# Patient Record
Sex: MALE | Race: WHITE | NOT HISPANIC OR LATINO | Employment: OTHER | URBAN - NONMETROPOLITAN AREA
[De-identification: names, ages, dates, MRNs, and addresses within clinical notes are randomized per-mention and may not be internally consistent; named-entity substitution may affect disease eponyms.]

---

## 2021-09-04 ENCOUNTER — HISTORICAL (OUTPATIENT)
Dept: ADMINISTRATIVE | Facility: HOSPITAL | Age: 61
End: 2021-09-04

## 2021-10-05 LAB
BILIRUB SERPL-MCNC: NEGATIVE MG/DL
BLOOD URINE, POC: NEGATIVE
CLARITY, POC UA: CLEAR
COLOR, POC UA: YELLOW
GLUCOSE UR QL STRIP: NORMAL
KETONES UR QL STRIP: NEGATIVE
LEUKOCYTE EST, POC UA: NEGATIVE
NITRITE, POC UA: NEGATIVE
PH, POC UA: 5.5
PROTEIN, POC: NEGATIVE
SPECIFIC GRAVITY, POC UA: 1.01
UROBILINOGEN, POC UA: NORMAL

## 2021-11-11 ENCOUNTER — HISTORICAL (OUTPATIENT)
Dept: ADMINISTRATIVE | Facility: HOSPITAL | Age: 61
End: 2021-11-11

## 2021-11-18 ENCOUNTER — HISTORICAL (OUTPATIENT)
Dept: ADMINISTRATIVE | Facility: HOSPITAL | Age: 61
End: 2021-11-18

## 2021-11-18 LAB
ABS NEUT (OLG): 2.83 X10(3)/MCL (ref 2.1–9.2)
ALBUMIN SERPL-MCNC: 4.2 GM/DL (ref 3.4–4.8)
ALBUMIN/GLOB SERPL: 1.1 RATIO (ref 1.1–2)
ALP SERPL-CCNC: 62 UNIT/L (ref 40–150)
ALT SERPL-CCNC: 24 UNIT/L (ref 0–55)
AST SERPL-CCNC: 26 UNIT/L (ref 5–34)
BASOPHILS # BLD AUTO: 0.1 X10(3)/MCL (ref 0–0.2)
BASOPHILS NFR BLD AUTO: 1 %
BILIRUB SERPL-MCNC: 0.4 MG/DL
BILIRUBIN DIRECT+TOT PNL SERPL-MCNC: 0.2 MG/DL (ref 0–0.5)
BILIRUBIN DIRECT+TOT PNL SERPL-MCNC: 0.2 MG/DL (ref 0–0.8)
BUN SERPL-MCNC: 29.1 MG/DL (ref 8.4–25.7)
CALCIUM SERPL-MCNC: 10.1 MG/DL (ref 8.7–10.5)
CHLORIDE SERPL-SCNC: 100 MMOL/L (ref 98–107)
CO2 SERPL-SCNC: 31 MMOL/L (ref 23–31)
CREAT SERPL-MCNC: 1.16 MG/DL (ref 0.73–1.18)
CRP SERPL-MCNC: 0.58 MG/DL
EOSINOPHIL # BLD AUTO: 0.2 X10(3)/MCL (ref 0–0.9)
EOSINOPHIL NFR BLD AUTO: 4 %
ERYTHROCYTE [DISTWIDTH] IN BLOOD BY AUTOMATED COUNT: 13.7 % (ref 11.5–14.5)
ERYTHROCYTE [SEDIMENTATION RATE] IN BLOOD: 7 MM/HR (ref 0–15)
GLOBULIN SER-MCNC: 3.8 GM/DL (ref 2.4–3.5)
GLUCOSE SERPL-MCNC: 173 MG/DL (ref 82–115)
HCT VFR BLD AUTO: 47 % (ref 40–51)
HGB BLD-MCNC: 16.1 GM/DL (ref 13.5–17.5)
IMM GRANULOCYTES # BLD AUTO: 0.01 10*3/UL
IMM GRANULOCYTES NFR BLD AUTO: 0 %
LYMPHOCYTES # BLD AUTO: 1.9 X10(3)/MCL (ref 0.6–4.6)
LYMPHOCYTES NFR BLD AUTO: 34 %
MCH RBC QN AUTO: 34.5 PG (ref 26–34)
MCHC RBC AUTO-ENTMCNC: 34.3 GM/DL (ref 31–37)
MCV RBC AUTO: 100.9 FL (ref 80–100)
MONOCYTES # BLD AUTO: 0.4 X10(3)/MCL (ref 0.1–1.3)
MONOCYTES NFR BLD AUTO: 8 %
NEUTROPHILS # BLD AUTO: 2.83 X10(3)/MCL (ref 2.1–9.2)
NEUTROPHILS NFR BLD AUTO: 52 %
NRBC BLD AUTO-RTO: 0 % (ref 0–0.2)
PLATELET # BLD AUTO: 52 X10(3)/MCL (ref 130–400)
PMV BLD AUTO: 11.6 FL (ref 7.4–10.4)
POTASSIUM SERPL-SCNC: 4.3 MMOL/L (ref 3.5–5.1)
PROT SERPL-MCNC: 8 GM/DL (ref 5.8–7.6)
RBC # BLD AUTO: 4.66 X10(6)/MCL (ref 4.5–5.9)
SODIUM SERPL-SCNC: 140 MMOL/L (ref 136–145)
WBC # SPEC AUTO: 5.5 X10(3)/MCL (ref 4.5–11)

## 2022-01-03 ENCOUNTER — HISTORICAL (OUTPATIENT)
Dept: ADMINISTRATIVE | Facility: HOSPITAL | Age: 62
End: 2022-01-03

## 2022-01-03 LAB
ABS NEUT (OLG): 4.03 X10(3)/MCL (ref 2.1–9.2)
ALBUMIN SERPL-MCNC: 4.4 GM/DL (ref 3.4–4.8)
ALBUMIN/GLOB SERPL: 1.1 RATIO (ref 1.1–2)
ALP SERPL-CCNC: 71 UNIT/L (ref 40–150)
ALT SERPL-CCNC: 25 UNIT/L (ref 0–55)
AST SERPL-CCNC: 29 UNIT/L (ref 5–34)
BASOPHILS # BLD AUTO: 0.1 X10(3)/MCL (ref 0–0.2)
BASOPHILS NFR BLD AUTO: 1 %
BILIRUB SERPL-MCNC: 1 MG/DL
BILIRUBIN DIRECT+TOT PNL SERPL-MCNC: 0.3 MG/DL (ref 0–0.5)
BILIRUBIN DIRECT+TOT PNL SERPL-MCNC: 0.7 MG/DL (ref 0–0.8)
BUN SERPL-MCNC: 17.4 MG/DL (ref 8.4–25.7)
CALCIUM SERPL-MCNC: 10.4 MG/DL (ref 8.7–10.5)
CHLORIDE SERPL-SCNC: 101 MMOL/L (ref 98–107)
CO2 SERPL-SCNC: 28 MMOL/L (ref 23–31)
CREAT SERPL-MCNC: 0.95 MG/DL (ref 0.73–1.18)
CRP SERPL HS-MCNC: 0.32 MG/DL
EOSINOPHIL # BLD AUTO: 0.1 X10(3)/MCL (ref 0–0.9)
EOSINOPHIL NFR BLD AUTO: 2 %
ERYTHROCYTE [DISTWIDTH] IN BLOOD BY AUTOMATED COUNT: 14.1 % (ref 11.5–14.5)
ERYTHROCYTE [SEDIMENTATION RATE] IN BLOOD: 7 MM/HR (ref 0–15)
EST. AVERAGE GLUCOSE BLD GHB EST-MCNC: 182.9 MG/DL
FERRITIN SERPL-MCNC: 118.39 NG/ML (ref 21.81–274.66)
FOLATE SERPL-MCNC: 15.4 NG/ML (ref 7–31.4)
GLOBULIN SER-MCNC: 4 GM/DL (ref 2.4–3.5)
GLUCOSE SERPL-MCNC: 259 MG/DL (ref 82–115)
HBA1C MFR BLD: 8 %
HCT VFR BLD AUTO: 51.1 % (ref 40–51)
HGB BLD-MCNC: 17.6 GM/DL (ref 13.5–17.5)
IMM GRANULOCYTES # BLD AUTO: 0.03 10*3/UL
IMM GRANULOCYTES NFR BLD AUTO: 0 %
IRON SERPL-MCNC: 109 UG/DL (ref 65–175)
LYMPHOCYTES # BLD AUTO: 1.6 X10(3)/MCL (ref 0.6–4.6)
LYMPHOCYTES NFR BLD AUTO: 25 %
MCH RBC QN AUTO: 34.4 PG (ref 26–34)
MCHC RBC AUTO-ENTMCNC: 34.4 GM/DL (ref 31–37)
MCV RBC AUTO: 100 FL (ref 80–100)
MONOCYTES # BLD AUTO: 0.5 X10(3)/MCL (ref 0.1–1.3)
MONOCYTES NFR BLD AUTO: 8 %
NEUTROPHILS # BLD AUTO: 4.03 X10(3)/MCL (ref 2.1–9.2)
NEUTROPHILS NFR BLD AUTO: 64 %
NRBC BLD AUTO-RTO: 0 % (ref 0–0.2)
PLATELET # BLD AUTO: 122 X10(3)/MCL (ref 130–400)
PMV BLD AUTO: 11.3 FL (ref 7.4–10.4)
POTASSIUM SERPL-SCNC: 4.8 MMOL/L (ref 3.5–5.1)
PROT SERPL-MCNC: 8.4 GM/DL (ref 5.8–7.6)
RBC # BLD AUTO: 5.11 X10(6)/MCL (ref 4.5–5.9)
SODIUM SERPL-SCNC: 138 MMOL/L (ref 136–145)
VIT B12 SERPL-MCNC: 617 PG/ML (ref 213–816)
WBC # SPEC AUTO: 6.3 X10(3)/MCL (ref 4.5–11)

## 2022-03-02 ENCOUNTER — HISTORICAL (OUTPATIENT)
Dept: ADMINISTRATIVE | Facility: HOSPITAL | Age: 62
End: 2022-03-02

## 2022-04-10 ENCOUNTER — HISTORICAL (OUTPATIENT)
Dept: ADMINISTRATIVE | Facility: HOSPITAL | Age: 62
End: 2022-04-10
Payer: COMMERCIAL

## 2022-04-26 VITALS
BODY MASS INDEX: 31.26 KG/M2 | SYSTOLIC BLOOD PRESSURE: 116 MMHG | HEIGHT: 71 IN | OXYGEN SATURATION: 97 % | WEIGHT: 223.31 LBS | DIASTOLIC BLOOD PRESSURE: 70 MMHG

## 2022-05-03 NOTE — HISTORICAL [facility] CERNER
This is a historical note converted from Brittany. Formatting and pictures may have been removed.  Please reference Brittany for original formatting and attached multimedia. Chief Complaint  osteo f/u  History of Present Illness  Mr Arnold is a 61 yr old WM here for post ken ID follow up for osteomyelitis to bilateral feet in the setting of diabetic foot ulcers.? Pt was treated with IV antimicrobials (Vancomycin and Zosyn) for 31 or 42 days.? He had to remain inpatient for treatment d/t insurance issues while awaiting LTAC placement.? He was then denied LTAC placement by insurance.? He transitioned to oral treatment with Cipro + Zyvox to complete the remainder of treatment as he wanted to go home (these medications get same PO / IV bioavailability).? Was not a candidate for home OPAT as he could not see and lacked transportation to come back and forth to hospital.? Pt reports he took medications once home with 100% compliance.? He thinks he?could have?had possible subjective fevers when he initially arrived home, but did not have thermometer to monitor,?and has not had any since.? Denies chills, night sweats, rash, HA, vision changes, dizziness, CP/SOB, cough, N/V/D, abdominal pain, or urinary complaints.? Pt has been going weekly to wound care.? States had calculus removal / debridement on last visit.? Another wound care visit is planned for today.? Pt does not monitor CBGs at home d/t his inability to see, but states everything in the home is sugar-free.? Pt was treated with Bicillin 2.4 million units IM weekly x 3 while inpatient for latent syphilis of unknown duration.? Smokes 1/4 ppd.? IM post ken visit on 11/22/21.  Review of Systems  Full 14 point ROS performed. ?All negative, except as mentioned in HPI?  Physical Exam  Vitals & Measurements  T:?36.5? ?C (Oral)? HR:?61(Peripheral)? RR:?16? BP:?116/70?  HT:?180.00?cm? WT:?101.300?kg? BMI:?31.27?  General:?Well-developed well-nourished WM in no acute  distress  Eye: PERRL, clear conjunctiva, eyelids normal, + blindness / low vision bilateral eyes  HENT:?oropharynx without erythema/exudate, oropharynx and nasal mucosal surfaces moist, no thrush, dry lips  Neck: supple, no JVD  Respiratory:?Breathing unlabored. Lungs clear to auscultation bilaterally  Cardiovascular:?regular rate and rhythm without murmurs, gallops or rubs  Gastrointestinal:?soft, non-tender, non-distended with normal bowel sounds, without masses to palpation  Genitourinary: no CVA tenderness to palpation  Musculoskeletal:?full range of motion of all extremities/spine without limitation or discomfort  Integumentary: no rashes, dried ulcerations / calluses bilateral feet  Neurologic: cranial nerves grossly intact  Assessment/Plan  1.?Osteomyelitis of right foot?M86.9,?Osteomyelitis of left foot?M86.9  Pt has completed 42 day?course of treatment of osteomyelitis  Remains with ulcerations; pt to keep wound care appointments  Have discussed with pt?the use of?chronic suppressive therapy; risks vs benefits, along with a/es.? Pt in agreement to proceed.? Will plan to start pt on Doxycycline 100 mg po BID.? Pt to remain upright for at least 30 minutes to prevent pill esophagitis, along with wearing sun protection d/t photosensitivity with medication (avoid directed sun exposure, wear sunscreen, wear sunglasses, wear long sleeves / skin protection, etc). Pt verbalized understanding and agrees with plan. ?  RTC in 3 months with Dr RAVINDRA Camp  Ordered:  doxycycline, 100 mg = 1 cap(s), Oral, BID, Stay upright 30 minutes after taking; watch for photosensitivity issues, X 30 day(s), # 60 cap(s), 3 Refill(s), Pharmacy: [id number], 180, cm, Height/Length Dosing, 11/18/21 12:45:00 CST, 101.3, kg, Weight Dosi...  1160F- Medication reconciliation completed during visit, Osteomyelitis of right foot  Osteomyelitis of left foot  Diabetic foot ulcer with osteomyelitis  Blindness/low vision  Tobacco user, [place]  [facility], 11/18/21 13:27:00 CST  Clinic Follow up, *Est. 02/17/22 12:45:00 CST, Order for future visit, Blindness/low vision, [facility]  Procalcitonin- [facility]- [id number], Routine collect, 11/18/21 13:33:00 CST, Blood, Stop date 11/18/21 13:33:00 CST, Lab Collect, Osteomyelitis of left foot  Osteomyelitis of left foot, 11/18/21 13:33:00 CST  ?  3.?Diabetic foot ulcer with osteomyelitis?E11.621  Strict diabetic control encouraged  Keep wound care appointments as scheduled  Ordered:  1160F- Medication reconciliation completed during visit, Osteomyelitis of right foot  Osteomyelitis of left foot  Diabetic foot ulcer with osteomyelitis  Blindness/low vision  Tobacco user, [facility], 11/18/21 13:27:00 CST  Clinic Follow up, *Est. 02/17/22 12:45:00 CST, Order for future visit, Blindness/low vision, [facility]  ?  4.?History of syphilis?Z86.19  Last RPR nonreactive  Completed full course of treatment with Bicillin 2.4 million units IM x 3 for latent syphilis of unknown duration  Will need to check RPR on next visit  ?  5.?Blindness/low vision?H54.7  Safety measures encouraged  Ordered:  1160F- Medication reconciliation completed during visit, Osteomyelitis of right foot  Osteomyelitis of left foot  Diabetic foot ulcer with osteomyelitis  Blindness/low vision  Tobacco user, [facility], 11/18/21 13:27:00 CST  Clinic Follow up, *Est. 02/17/22 12:45:00 CST, Order for future visit, Blindness/low vision, [facility]  ?  6.?Tobacco user?Z72.0  Smoking cessation encouraged  Ordered:  1160F- Medication reconciliation completed during visit, Osteomyelitis of right foot  Osteomyelitis of left foot  Diabetic foot ulcer with osteomyelitis  Blindness/low vision  Tobacco user, [facility], 11/18/21 13:27:00 CST  ?  7. Diabetes  Follow up with  clinic for further management  Strict diabetic control stressed  Referrals  Clinic Follow up, *Est. 02/17/22 12:45:00 CST, Order for future visit, Blindness/low vision,  [facility]   Problem List/Past Medical History  Ongoing  Acute bronchitis  Anxiety depression  Bipolar 1 disorder  Diabetes  Non-healing wound of right lower extremity  Historical  No qualifying data  Procedure/Surgical History  Insertion of Infusion Device into Superior Vena Cava, Percutaneous Approach (10/21/2021)  Ultrasonography of Superior Vena Cava, Guidance (10/21/2021)  appendectomy  right foot sx  stint to l leg   Medications  Coreg 3.125 mg oral tablet, 3.125 mg= 1 tab(s), Oral, BIDWMeal,? ?Not taking  Depakote  mg oral tablet, extended release, 500 mg= 1 tab(s), Oral, BID,? ?Not taking: has been out since March 2021  doxycycline hyclate 100 mg oral capsule, 100 mg= 1 cap(s), Oral, BID, 3 refills  folic acid 1 mg oral tablet, 1 mg= 1 tab(s), Oral, Daily  furosemide 40 mg oral tablet, 40 mg= 1 tab(s), Oral, BID  Lantus 100 units/mL subcutaneous solution, 45 units, Subcutaneous, At Bedtime  metFORMIN 1000 mg oral tablet, 1000 mg= 1 tab(s), Oral, BID,? ?Not taking: has been out since March 2021  mirtazapine 15 mg oral tablet, disintegrating, 15 mg= 1 tab(s), Oral, Once a day (at bedtime),? ?Not taking: has been out since March 2021  Potassium Chloride (Eqv-K-Tab) 10 mEq oral tablet, extended release, 10 mEq= 1 tab(s), Oral, Daily  spironolactone 25 mg oral tablet, 25 mg= 1 tab(s), Oral, BID  Allergies  sulfa drugs?(hypotension)  Social History  Abuse/Neglect  No, No, Yes, 11/18/2021  Alcohol  Past, Wine, Liquor, 1-2 times per week, Alcohol use interferes with work or home: No. Others hurt by drinking: No. Household alcohol concerns: No., 10/06/2021  Substance Use  Never, 10/06/2021  Tobacco  5-9 cigarettes (between 1/4 to 1/2 pack)/day in last 30 days, No, 11/18/2021  Family History  unknown  Immunizations  Vaccine Date Status Comments   influenza virus vaccine, inactivated 10/06/2021 Given    tetanus/diphtheria/pertussis, acel(Tdap) 10/06/2021 Given    pneumococcal 13-valent conjugate vaccine  10/06/2021 Given Early/Late Reason:  New Med Order   Health Maintenance  Health Maintenance  ???Pending?(in the next year)  ??? ??Due?  ??? ? ? ?Aspirin Therapy for CVD Prevention due??11/18/21??and every 1??year(s)  ??? ? ? ?COPD Maintenance-Spirometry due??11/18/21??Unknown Frequency  ??? ? ? ?Colorectal Screening due??11/18/21??Unknown Frequency  ??? ? ? ?Diabetes Maintenance-Eye Exam due??11/18/21??Unknown Frequency  ??? ? ? ?Diabetes Maintenance-Foot Exam due??11/18/21??Unknown Frequency  ??? ? ? ?Lung Cancer Screening due??11/18/21??and every 1??year(s)  ??? ? ? ?Medicare Annual Wellness Exam due??11/18/21??and every 1??year(s)  ??? ? ? ?Zoster Vaccine due??11/18/21??Unknown Frequency  ??? ??Due In Future?  ??? ? ? ?Obesity Screening not due until??01/01/22??and every 1??year(s)  ??? ? ? ?Smoking Cessation not due until??01/01/22??and every 1??year(s)  ??? ? ? ?Alcohol Misuse Screening not due until??01/02/22??and every 1??year(s)  ??? ? ? ?Diabetes Maintenance-HgbA1c not due until??10/05/22??and every 1??year(s)  ??? ? ? ?Diabetes Maintenance-Fasting Lipid Profile not due until??10/06/22??and every 1??year(s)  ???Satisfied?(in the past 1 year)  ??? ??Satisfied?  ??? ? ? ?ADL Screening on??11/18/21.??Satisfied by Falguni Adam  ??? ? ? ?Alcohol Misuse Screening on??10/05/21.??Satisfied by Masha Reid LPN  ??? ? ? ?Blood Pressure Screening on??11/18/21.??Satisfied by Falguni Adam  ??? ? ? ?Body Mass Index Check on??11/18/21.??Satisfied by Falguni Adam  ??? ? ? ?Depression Screening on??11/18/21.??Satisfied by Falguni Adam  ??? ? ? ?Diabetes Maintenance-Serum Creatinine on??11/18/21.??Satisfied by Clara Edward  ??? ? ? ?Diabetes Maintenance-Fasting Lipid Profile on??10/06/21.??Satisfied by [facility], [facility]  ??? ? ? ?Diabetes Maintenance-HgbA1c on??10/05/21.??Satisfied by Clive Ghosh  ??? ? ? ?Diabetes Screening on??11/18/21.??Satisfied by Clara Edward  ??? ? ?  ?Influenza Vaccine on??10/06/21.??Satisfied by Ayse Garnett RN  ??? ? ? ?Lipid Screening on??10/06/21.??Satisfied by [facility], [facility]  ??? ? ? ?Obesity Screening on??11/18/21.??Satisfied by Falguni Adam  ??? ? ? ?Smoking Cessation on??10/13/21.??Satisfied by Luis Chang LPN  ??? ? ? ?Tetanus Vaccine on??10/06/21.??Satisfied by Ayse Garnett RN  ?  Lab Results  Test Name Test Result Date/Time Comments   Sodium Lvl 139 mmol/L 11/05/2021 11:54 CDT    Potassium Lvl 4.1 mmol/L 11/05/2021 11:54 CDT    Chloride 102 mmol/L 11/05/2021 11:54 CDT    CO2 30 mmol/L 11/05/2021 11:54 CDT    Calcium Lvl 10.1 mg/dL 11/05/2021 11:54 CDT    Glucose Lvl 125 mg/dL (High) 11/05/2021 11:54 CDT    BUN 24.1 mg/dL 11/05/2021 11:54 CDT    Creatinine 1.12 mg/dL 11/05/2021 11:54 CDT    eGFR-KAJAL 70 mL/min/1.73 m2 (Low) 11/05/2021 11:54 CDT    CRP <0.40 mg/dL 11/05/2021 08:23 CDT    WBC 5.7 x10(3)/mcL 11/03/2021 10:00 CDT    Hgb 14.6 gm/dL 11/03/2021 10:00 CDT    Hct 42.1 % 11/03/2021 10:00 CDT    Platelet 87 x10(3)/mcL (Low) 11/03/2021 10:00 CDT    Sed Rate 8 mm/hr 11/05/2021 08:23 CDT Note: reference ranges for ages 50 and over have changed.   Vanco Tr 17.2 ug/ml 11/05/2021 10:00 CDT        Patient seen and examined with NP. ?Agree with documented physical exam. ?Treatment plan reviewed and discussed with nurse practitioner and is reasonable and appropriate. ?

## 2022-05-10 ENCOUNTER — TELEPHONE (OUTPATIENT)
Dept: INTERNAL MEDICINE | Facility: CLINIC | Age: 62
End: 2022-05-10
Payer: COMMERCIAL

## 2022-05-23 ENCOUNTER — HOSPITAL ENCOUNTER (OUTPATIENT)
Dept: WOUND CARE | Facility: HOSPITAL | Age: 62
Discharge: HOME | End: 2022-05-23
Attending: NURSE PRACTITIONER
Payer: COMMERCIAL

## 2022-05-23 VITALS
HEART RATE: 72 BPM | RESPIRATION RATE: 20 BRPM | DIASTOLIC BLOOD PRESSURE: 71 MMHG | SYSTOLIC BLOOD PRESSURE: 125 MMHG | TEMPERATURE: 98 F

## 2022-05-23 DIAGNOSIS — M86.672 CHRONIC OSTEOMYELITIS OF LEFT FOOT: ICD-10-CM

## 2022-05-23 DIAGNOSIS — L60.3 DYSTROPHIC NAIL: ICD-10-CM

## 2022-05-23 DIAGNOSIS — L84 CORNS AND CALLOSITIES: ICD-10-CM

## 2022-05-23 DIAGNOSIS — M86.671 CHRONIC OSTEOMYELITIS OF RIGHT FOOT: ICD-10-CM

## 2022-05-23 DIAGNOSIS — H54.3 BLINDNESS OF BOTH EYES: ICD-10-CM

## 2022-05-23 DIAGNOSIS — E11.622 TYPE 2 DIABETES MELLITUS WITH OTHER SKIN ULCER, WITHOUT LONG-TERM CURRENT USE OF INSULIN: Primary | ICD-10-CM

## 2022-05-23 DIAGNOSIS — Z72.0 TOBACCO ABUSE: ICD-10-CM

## 2022-05-23 DIAGNOSIS — L60.2 OVERGROWN TOENAILS: ICD-10-CM

## 2022-05-23 PROCEDURE — 97597 DBRDMT OPN WND 1ST 20 CM/<: CPT

## 2022-05-23 PROCEDURE — 99203 PR OFFICE/OUTPT VISIT, NEW, LEVL III, 30-44 MIN: ICD-10-PCS | Mod: 25,,, | Performed by: NURSE PRACTITIONER

## 2022-05-23 PROCEDURE — 97598 DBRDMT OPN WND ADDL 20CM/<: CPT

## 2022-05-23 PROCEDURE — 99203 OFFICE O/P NEW LOW 30 MIN: CPT | Mod: 25,,, | Performed by: NURSE PRACTITIONER

## 2022-05-23 PROCEDURE — 97597 PR DEBRIDEMENT OPEN WOUND 20 SQ CM<: ICD-10-PCS | Mod: ,,, | Performed by: NURSE PRACTITIONER

## 2022-05-23 PROCEDURE — 97597 DBRDMT OPN WND 1ST 20 CM/<: CPT | Mod: ,,, | Performed by: NURSE PRACTITIONER

## 2022-05-23 PROCEDURE — 11721 DEBRIDE NAIL 6 OR MORE: CPT | Mod: 59

## 2022-05-23 RX ORDER — DOXYCYCLINE 100 MG/1
CAPSULE ORAL
COMMUNITY
Start: 2022-04-27 | End: 2022-06-06 | Stop reason: SDUPTHER

## 2022-05-23 NOTE — PROGRESS NOTES
Subjective:       Patient ID: Con Arnold is a 61 y.o. male.    Chief Complaint: Diabetic Foot Ulcer    60 y/o male presents to wound care care today follow up regarding bilateral feet callous Patient states that he now has a place to live and his car back.  Patient has seen Dr. Camp in the past for chronic osteomyelitis patient is aware of consequences possible bilateral amputation of feet due to osteomyelitis, and of not keeping his appointments, noncompliance medications and Diabetes medications.  Hospitalized on 3/2/2022 for chronic osteomyelitis of bilateral feet and right foot cellulitis patient was treated with IV antibiotics with set up to go to nursing home patient reports decided to stay with partner and was able to find a place in [place], [place].   Hx: DM,Blindness/low vision,Diabetic foot ulcer with osteomyelitis,Osteomyelitis of left and right foot, Syphilis, and Tobacco use.    Recent Review of Labs:Glucose Lvl 157 mg/dL 03/09/2022BUN 33.6 mg/dL 03/09/2022Creatinine 0.81 mg/dL 03/09/2022eGFR-AA >105  03/09/2022  eGFR-KAJAL 102 mL/min/1.73 m2 03/09/2022  CRP <0.40 mg/dL 03/10/2022Sed Rate 8 mm/hr 03/10/2022Hgb A1c 8.0 % 01/03/2022  Xray Right Foot 3/2/22:  No acute displaced fractures or dislocations.  There is evidence of erosive changes involving the second metatarsal  head with slight subluxation of the metatarsal pharyngeal joint  disease changes are similar to the exam of October 5, 2021 there are  some degenerative changes of the interphalangeal joints no acute  fractures or dislocations are otherwise identified a radiopaque 3 mm  foreign body is identified at the plantar aspect Soft tissues within  normal limits.  IMPRESSION:Persistent erosive changes of the second metatarsal with slight  subluxation.Radiopaque foreign body.No other significant change    Xray Left Foot 3/2/22:  No acute displaced fractures or dislocations.  There is some narrowing of the interphalangeal joints with  slight  flexion deformity of the third fourth and fifth toe also evidence of  subluxation involving the second metatarsal phalangeal joint and  slight subluxation involving the third metatarsal pharyngeal joint.  There is a hallux valgus deformity  No blastic or lytic lesions. Soft tissues within normal limits.  IMPRESSION: Subluxation of the second and third metatarsophalangeal joints.  Flexion deformity of the third fourth and fifth toes with degenerative  changes. Hallux valgus deformity    MRI 3/3/22: Left foot   Trabecular edema and abnormal enhancement present to the second  metatarsal head have worsened minimally since the prior study and  early osteomyelitis is likely.  Trabecular edema to the first metatarsal head and neck is stable and  appears to be associated with marginal erosions to the first  metatarsal phalangeal joint. This is favored to represent  osteomyelitis/septic arthritis or gout. Osteomyelitis is favored.  Dorsal dislocation of the proximal second and third phalanges with  tears of the associated plantar plates.  The thin fluid collection previously seen in the ulcer base over the  third metatarsal head has increased minimally in size and is favored  to represent a small abscess or adventitial bursitis.  Myositis and synovitis are noted to the forefoot.   MRI 3/3/22: Right foot  There has been relatively little change when compared to the prior  study. Arguably the infected portion of the distal second metatarsal  has decreased in the interval but the degree of destruction appears to  have worsened and the difference, if any, would likely be clinically  insignificant.  Septic arthritis of the second metatarsal phalangeal joint which  decompresses into the adjacent soft tissue. Second plantar plate and  second flexor tendon are torn.  Myositis is present to the anterior interossei muscles. Cellulitis is  present to the forefoot.    Today's Visit :   Patient presents to clinic with bilateral  plantar calluses left foot plantar surface callus black center and macerated, and right foot plantar surface callus soft.  Left plantar callous paired using Dremel. Left plantar callous selective debridement using #7 and #4 Dermel curette. Trimmed all dystrophic toenails using podiatry clippers and filed with Orchard board.   Instructed patient he needs to follow-up with podiatrist patient voices his called and left messages with Dr. Mancia office and waiting call back.   Instructed patient this is a maintenance calluses and he does have chronic osteomyelitis he needs to take all medicines as prescribe. Instructed to wash feet with Hibiscleanse soap  paint his left plantar callous area with Betadine or Gentain violet. Right plantar callous keep moisturized. Pt voices he is schedule to see his new PCP next week. Informed he to let his PCP know he needs diabetic shoes or insert scripts. Agreed. Denies fevers, chills, increased redness, increased pain, odors, or yellow/green drainage. No new skin breakdown, rashes, or other skin issues.       Review of Systems   All other systems reviewed and are negative.      Objective:      Physical Exam  Cardiovascular:      Pulses:           Dorsalis pedis pulses are 1+ on the right side and 1+ on the left side.        Posterior tibial pulses are 1+ on the right side and 1+ on the left side.   Pulmonary:      Effort: Pulmonary effort is normal.   Musculoskeletal:        Feet:    Feet:      Right foot:      Toenail Condition: Right toenails are abnormally thick and long. Fungal disease present.     Left foot:      Toenail Condition: Left toenails are abnormally thick and long. Fungal disease present.     Comments: Callus to left plantar surface with black wound bed macerated edges and wound bed foul odor.    Right plantar callus smooth edges after filing with Dremel.    Monofilament test performed no sensation noted to all areas.    Neurological:      Mental Status: He is alert.          Assessment:       1. Type 2 diabetes mellitus with other skin ulcer, without long-term current use of insulin    2. Corns and callosities    3. Chronic osteomyelitis of left foot    4. Chronic osteomyelitis of right foot    5. Dystrophic nail    6. Overgrown toenails    7. Tobacco abuse    8. Blindness of both eyes           Wound 05/23/22 1347 Diabetic Ulcer Left plantar Foot #1 (Active)   05/23/22 1347    Pre-existing: Yes   Primary Wound Type: Diabetic ulc   Side: Left   Orientation: plantar   Location: Foot   Wound Number: #1   Ankle-Brachial Index:    Pulses:    Removal Indication and Assessment:    Wound Outcome:    (Retired) Wound Type:    (Retired) Wound Length (cm):    (Retired) Wound Width (cm):    (Retired) Depth (cm):    Wound Description (Comments):    Removal Indications:    Wound Image   05/23/22 1349   Dressing Appearance Moist drainage;Dry 05/23/22 1340   Drainage Amount None 05/23/22 1340   Tissue loss description Full thickness 05/23/22 1340   Wound Edges Callused 05/23/22 1340   Wound Length (cm) 2.5 cm 05/23/22 1340   Wound Width (cm) 1.5 cm 05/23/22 1340   Wound Depth (cm) 0 cm 05/23/22 1340   Wound Volume (cm^3) 0 cm^3 05/23/22 1340   Wound Surface Area (cm^2) 3.75 cm^2 05/23/22 1340            Wound 05/23/22 1345 Diabetic Ulcer Right plantar Foot #2 (Active)   05/23/22 1345    Pre-existing: Yes   Primary Wound Type: Diabetic ulc   Side: Right   Orientation: plantar   Location: Foot   Wound Number: #2   Ankle-Brachial Index:    Pulses:    Removal Indication and Assessment:    Wound Outcome:    (Retired) Wound Type:    (Retired) Wound Length (cm):    (Retired) Wound Width (cm):    (Retired) Depth (cm):    Wound Description (Comments):    Removal Indications:    Dressing Appearance Moist drainage 05/23/22 1349   Drainage Amount Moderate 05/23/22 1349   Drainage Characteristics/Odor Serosanguineous;Malodorous 05/23/22 1349   Tissue loss description Full thickness 05/23/22 1349   Wound  Length (cm) 4.5 cm 05/23/22 1349   Wound Width (cm) 1.6 cm 05/23/22 1349   Wound Depth (cm) 0.5 cm 05/23/22 1349   Wound Volume (cm^3) 3.6 cm^3 05/23/22 1349   Wound Surface Area (cm^2) 7.2 cm^2 05/23/22 1349           Plan:            1.-6. Foot care; wash feet daily with Hibiclens soap pat dry apply Gentain Violet or Betadine to left plantar callous. Continue taking suppressive treatment of Doxycycline. Will follow up in one month.    7. Instructed on smoking cessation.  8. Instructed on safety with ambulation due to blindness, and have assistance with foot care and checking feet daily.

## 2022-05-27 ENCOUNTER — TELEPHONE (OUTPATIENT)
Dept: FAMILY MEDICINE | Facility: CLINIC | Age: 62
End: 2022-05-27
Payer: COMMERCIAL

## 2022-05-31 ENCOUNTER — TELEPHONE (OUTPATIENT)
Dept: PRIMARY CARE CLINIC | Facility: CLINIC | Age: 62
End: 2022-05-31
Payer: COMMERCIAL

## 2022-05-31 NOTE — TELEPHONE ENCOUNTER
Patient is medically complex.  Appears to have canceled an in office appt with provider in [place] today, then rescheduled a virtual appt with me tomorrow.    Please advise patient new patient appt are not preformed virtually and his medical situation is not appropriate for at 15 minute appt.    He should get an in office appt with provider in [place].      - Dr. Dias

## 2022-05-31 NOTE — TELEPHONE ENCOUNTER
Provider can't refill medication until establish care has been provided first ,then refills are available with continuous f/u appt. Patient can go to ED or [facility] for medication refill until care has been establish.Thanks

## 2022-06-01 ENCOUNTER — PATIENT MESSAGE (OUTPATIENT)
Dept: PRIMARY CARE CLINIC | Facility: CLINIC | Age: 62
End: 2022-06-01
Payer: COMMERCIAL

## 2022-06-01 ENCOUNTER — TELEPHONE (OUTPATIENT)
Dept: ENDOCRINOLOGY | Facility: CLINIC | Age: 62
End: 2022-06-01
Payer: COMMERCIAL

## 2022-06-01 NOTE — TELEPHONE ENCOUNTER
----- Message from Donna Weekly sent at 5/31/2022  3:27 PM CDT -----  Regarding: [facility]:  NO SHOW on 5/31/22  DR. STOUT PT    PT missed appt today with Dr. Ruiz.  When I called and spoke to pt to r/s this missed appt, he stated that he wouldn't want to r/s this appt b/c October/November is too long to wait.    Please inform Dr. Ruiz that pt doesn't want a f/up and is not r/s.

## 2022-06-01 NOTE — TELEPHONE ENCOUNTER
Tried to call patient, but there is no voicemail set up.  Unable to leave a message.  Will continue to try to reach him.

## 2022-06-06 ENCOUNTER — OFFICE VISIT (OUTPATIENT)
Dept: INFECTIOUS DISEASES | Facility: CLINIC | Age: 62
End: 2022-06-06
Payer: COMMERCIAL

## 2022-06-06 ENCOUNTER — TELEPHONE (OUTPATIENT)
Dept: INFECTIOUS DISEASES | Facility: CLINIC | Age: 62
End: 2022-06-06

## 2022-06-06 VITALS
DIASTOLIC BLOOD PRESSURE: 79 MMHG | WEIGHT: 213 LBS | BODY MASS INDEX: 28.85 KG/M2 | TEMPERATURE: 98 F | HEIGHT: 72 IN | RESPIRATION RATE: 18 BRPM | SYSTOLIC BLOOD PRESSURE: 125 MMHG | HEART RATE: 73 BPM

## 2022-06-06 DIAGNOSIS — M86.671 CHRONIC OSTEOMYELITIS OF RIGHT FOOT: ICD-10-CM

## 2022-06-06 DIAGNOSIS — Z72.51 HIGH RISK SEXUAL BEHAVIOR, UNSPECIFIED TYPE: ICD-10-CM

## 2022-06-06 DIAGNOSIS — N17.9 AKI (ACUTE KIDNEY INJURY): Primary | ICD-10-CM

## 2022-06-06 DIAGNOSIS — R89.9 ABNORMAL LABORATORY TEST RESULT: Primary | ICD-10-CM

## 2022-06-06 DIAGNOSIS — M86.672 CHRONIC OSTEOMYELITIS OF LEFT FOOT: Primary | ICD-10-CM

## 2022-06-06 DIAGNOSIS — Z86.19 HISTORY OF SYPHILIS: ICD-10-CM

## 2022-06-06 DIAGNOSIS — E13.621 DIABETIC FOOT ULCER ASSOCIATED WITH OTHER SPECIFIED DIABETES MELLITUS, UNSPECIFIED LATERALITY, UNSPECIFIED PART OF FOOT, UNSPECIFIED ULCER STAGE: ICD-10-CM

## 2022-06-06 DIAGNOSIS — Z79.2 CHRONIC ANTIBIOTIC SUPPRESSION: ICD-10-CM

## 2022-06-06 DIAGNOSIS — L97.509 DIABETIC FOOT ULCER ASSOCIATED WITH OTHER SPECIFIED DIABETES MELLITUS, UNSPECIFIED LATERALITY, UNSPECIFIED PART OF FOOT, UNSPECIFIED ULCER STAGE: ICD-10-CM

## 2022-06-06 DIAGNOSIS — H54.10 BLINDNESS AND LOW VISION: ICD-10-CM

## 2022-06-06 LAB
ALBUMIN SERPL-MCNC: 4.3 GM/DL (ref 3.4–4.8)
ALBUMIN/GLOB SERPL: 1 RATIO (ref 1.1–2)
ALP SERPL-CCNC: 84 UNIT/L (ref 40–150)
ALT SERPL-CCNC: 24 UNIT/L (ref 0–55)
AST SERPL-CCNC: 26 UNIT/L (ref 5–34)
BASOPHILS # BLD AUTO: 0.09 X10(3)/MCL (ref 0–0.2)
BASOPHILS NFR BLD AUTO: 1.1 %
BILIRUBIN DIRECT+TOT PNL SERPL-MCNC: 1 MG/DL
BUN SERPL-MCNC: 25 MG/DL (ref 8.4–25.7)
CALCIUM SERPL-MCNC: 11.3 MG/DL (ref 8.8–10)
CHLORIDE SERPL-SCNC: 97 MMOL/L (ref 98–107)
CO2 SERPL-SCNC: 29 MMOL/L (ref 23–31)
CREAT SERPL-MCNC: 1.43 MG/DL (ref 0.73–1.18)
CRP SERPL-MCNC: 7.1 MG/L
EOSINOPHIL # BLD AUTO: 0.12 X10(3)/MCL (ref 0–0.9)
EOSINOPHIL NFR BLD AUTO: 1.5 %
ERYTHROCYTE [DISTWIDTH] IN BLOOD BY AUTOMATED COUNT: 13.4 % (ref 11.5–17)
ERYTHROCYTE [SEDIMENTATION RATE] IN BLOOD: 39 MM/HR (ref 0–15)
GLOBULIN SER-MCNC: 4.4 GM/DL (ref 2.4–3.5)
GLUCOSE SERPL-MCNC: 363 MG/DL (ref 82–115)
HCT VFR BLD AUTO: 58 % (ref 42–52)
HGB BLD-MCNC: 19.4 GM/DL (ref 14–18)
HIV 1+2 AB+HIV1 P24 AG SERPL QL IA: NONREACTIVE
IMM GRANULOCYTES # BLD AUTO: 0.04 X10(3)/MCL (ref 0–0.02)
IMM GRANULOCYTES NFR BLD AUTO: 0.5 % (ref 0–0.43)
LYMPHOCYTES # BLD AUTO: 1.89 X10(3)/MCL (ref 0.6–4.6)
LYMPHOCYTES NFR BLD AUTO: 23.4 %
MCH RBC QN AUTO: 32.9 PG (ref 27–31)
MCHC RBC AUTO-ENTMCNC: 33.4 MG/DL (ref 33–36)
MCV RBC AUTO: 98.3 FL (ref 80–94)
MONOCYTES # BLD AUTO: 0.52 X10(3)/MCL (ref 0.1–1.3)
MONOCYTES NFR BLD AUTO: 6.4 %
NEUTROPHILS # BLD AUTO: 5.4 X10(3)/MCL (ref 2.1–9.2)
NEUTROPHILS NFR BLD AUTO: 67.1 %
NRBC BLD AUTO-RTO: 0 %
PLATELET # BLD AUTO: 119 X10(3)/MCL (ref 130–400)
PLATELETS.RETICULATED NFR BLD AUTO: 11.5 % (ref 0.9–11.2)
PMV BLD AUTO: 13.7 FL (ref 9.4–12.4)
POTASSIUM SERPL-SCNC: 4.6 MMOL/L (ref 3.5–5.1)
PROT SERPL-MCNC: 8.7 GM/DL (ref 5.8–7.6)
RBC # BLD AUTO: 5.9 X10(6)/MCL (ref 4.7–6.1)
SODIUM SERPL-SCNC: 134 MMOL/L (ref 136–145)
T PALLIDUM AB SER QL: ABNORMAL
T PALLIDUM AB SER QL: REACTIVE
WBC # SPEC AUTO: 8.1 X10(3)/MCL (ref 4.5–11.5)

## 2022-06-06 PROCEDURE — 86780 TREPONEMA PALLIDUM: CPT | Mod: 59 | Performed by: NURSE PRACTITIONER

## 2022-06-06 PROCEDURE — 87389 HIV-1 AG W/HIV-1&-2 AB AG IA: CPT | Performed by: NURSE PRACTITIONER

## 2022-06-06 PROCEDURE — 85025 COMPLETE CBC W/AUTO DIFF WBC: CPT | Performed by: NURSE PRACTITIONER

## 2022-06-06 PROCEDURE — 87536 HIV-1 QUANT&REVRSE TRNSCRPJ: CPT | Performed by: NURSE PRACTITIONER

## 2022-06-06 PROCEDURE — 1160F PR REVIEW ALL MEDS BY PRESCRIBER/CLIN PHARMACIST DOCUMENTED: ICD-10-PCS | Mod: CPTII,,, | Performed by: NURSE PRACTITIONER

## 2022-06-06 PROCEDURE — 3078F DIAST BP <80 MM HG: CPT | Mod: CPTII,,, | Performed by: NURSE PRACTITIONER

## 2022-06-06 PROCEDURE — 1159F MED LIST DOCD IN RCRD: CPT | Mod: CPTII,,, | Performed by: NURSE PRACTITIONER

## 2022-06-06 PROCEDURE — 4010F ACE/ARB THERAPY RXD/TAKEN: CPT | Mod: CPTII,,, | Performed by: NURSE PRACTITIONER

## 2022-06-06 PROCEDURE — 86140 C-REACTIVE PROTEIN: CPT | Performed by: NURSE PRACTITIONER

## 2022-06-06 PROCEDURE — 1159F PR MEDICATION LIST DOCUMENTED IN MEDICAL RECORD: ICD-10-PCS | Mod: CPTII,,, | Performed by: NURSE PRACTITIONER

## 2022-06-06 PROCEDURE — 80053 COMPREHEN METABOLIC PANEL: CPT | Performed by: NURSE PRACTITIONER

## 2022-06-06 PROCEDURE — 36415 COLL VENOUS BLD VENIPUNCTURE: CPT | Performed by: NURSE PRACTITIONER

## 2022-06-06 PROCEDURE — 4010F PR ACE/ARB THEARPY RXD/TAKEN: ICD-10-PCS | Mod: CPTII,,, | Performed by: NURSE PRACTITIONER

## 2022-06-06 PROCEDURE — 3074F SYST BP LT 130 MM HG: CPT | Mod: CPTII,,, | Performed by: NURSE PRACTITIONER

## 2022-06-06 PROCEDURE — 3008F BODY MASS INDEX DOCD: CPT | Mod: CPTII,,, | Performed by: NURSE PRACTITIONER

## 2022-06-06 PROCEDURE — 99215 OFFICE O/P EST HI 40 MIN: CPT | Mod: S$PBB,,, | Performed by: NURSE PRACTITIONER

## 2022-06-06 PROCEDURE — 99215 PR OFFICE/OUTPT VISIT, EST, LEVL V, 40-54 MIN: ICD-10-PCS | Mod: S$PBB,,, | Performed by: NURSE PRACTITIONER

## 2022-06-06 PROCEDURE — 86780 TREPONEMA PALLIDUM: CPT | Performed by: NURSE PRACTITIONER

## 2022-06-06 PROCEDURE — 86592 SYPHILIS TEST NON-TREP QUAL: CPT | Performed by: NURSE PRACTITIONER

## 2022-06-06 PROCEDURE — 85651 RBC SED RATE NONAUTOMATED: CPT | Performed by: NURSE PRACTITIONER

## 2022-06-06 PROCEDURE — 84145 PROCALCITONIN (PCT): CPT | Performed by: NURSE PRACTITIONER

## 2022-06-06 PROCEDURE — 3078F PR MOST RECENT DIASTOLIC BLOOD PRESSURE < 80 MM HG: ICD-10-PCS | Mod: CPTII,,, | Performed by: NURSE PRACTITIONER

## 2022-06-06 PROCEDURE — 3074F PR MOST RECENT SYSTOLIC BLOOD PRESSURE < 130 MM HG: ICD-10-PCS | Mod: CPTII,,, | Performed by: NURSE PRACTITIONER

## 2022-06-06 PROCEDURE — 1160F RVW MEDS BY RX/DR IN RCRD: CPT | Mod: CPTII,,, | Performed by: NURSE PRACTITIONER

## 2022-06-06 PROCEDURE — 99214 OFFICE O/P EST MOD 30 MIN: CPT | Mod: PBBFAC | Performed by: NURSE PRACTITIONER

## 2022-06-06 PROCEDURE — 3008F PR BODY MASS INDEX (BMI) DOCUMENTED: ICD-10-PCS | Mod: CPTII,,, | Performed by: NURSE PRACTITIONER

## 2022-06-06 RX ORDER — GABAPENTIN 300 MG/1
300 CAPSULE ORAL 3 TIMES DAILY
COMMUNITY
Start: 2022-03-11

## 2022-06-06 RX ORDER — DOXYCYCLINE 100 MG/1
100 CAPSULE ORAL EVERY 12 HOURS
Qty: 60 CAPSULE | Refills: 3 | Status: SHIPPED | OUTPATIENT
Start: 2022-06-06 | End: 2022-07-06

## 2022-06-06 RX ORDER — PRAVASTATIN SODIUM 80 MG/1
TABLET ORAL
COMMUNITY
End: 2022-11-11 | Stop reason: SDUPTHER

## 2022-06-06 RX ORDER — LATANOPROST 50 UG/ML
SOLUTION/ DROPS OPHTHALMIC
Status: ON HOLD | COMMUNITY
End: 2022-12-22 | Stop reason: HOSPADM

## 2022-06-06 RX ORDER — FLUTICASONE PROPIONATE AND SALMETEROL 250; 50 UG/1; UG/1
POWDER RESPIRATORY (INHALATION)
COMMUNITY
End: 2023-03-02

## 2022-06-06 RX ORDER — DICYCLOMINE HYDROCHLORIDE 20 MG/1
TABLET ORAL
COMMUNITY
End: 2023-03-02

## 2022-06-06 RX ORDER — ALBUTEROL SULFATE 90 UG/1
AEROSOL, METERED RESPIRATORY (INHALATION)
COMMUNITY

## 2022-06-06 NOTE — PROGRESS NOTES
Subjective:       Patient ID: Con Arnold is a 61 y.o. male.    Chief Complaint: Follow-up (Osteo Bilat Feet)    Mr Arnold is a 61 yr old WM MSM who presents for follow visit for chronic osteomyelitis bilateral feet in which he is on chronic suppressive therapy with Doxycycline.  He tells me he has been compliant with medications and it is well tolerated.  Last time pt was seen by ID he was supposed to go to NH as he could not adequately care for self and was homeless.  He states that he was unable to be placed in NH at that time, but since he and his partner have found reliable housing and are renting a home in [place].  Pt also has  services in which he is getting wound care for bilateral diabetic foot ulcers.  Pt reports he has not been checking CBGs d/t limited vision and he does not have adequate supplies.  He is going to new pt appointment today with Erick Medina NP in which he will establish care for PCP and he will also manage his wound care he tells me.  Last A1c from 3/4/22 was 10.9.  Pt had endocrine appointment scheduled for 6/1/22, but he missed it and chose to not reschedule.  Pt also missed last ID appointment back on 5/2/22.  Pts partner is HIV positive.  He tells me his partner has been off his ART as of late and they are occasionally sexually active.  Will screen him for this further today.  Pt has a previous syphilis history in which he was  Treated; last RPR negative.  He tells me he does not believe he needs any other additional STI screening.  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  He is having surgery to RT eye on 6/17/22 with Dr Dewey. Tdap is up to date (10/6/21).    3/7/22  Admit HPI: Mr Arnold is a pleasant 61 yr old WM with past medical history that is significant for DM II (Hgb A1c 8.0 on 1/3/22), osteomyelitis bilateral feet with diabetic foot ulcers, prior LT lower extremity stent, blindness / low vision bilateral / glaucoma / cataracts, history of syphilis (treated),  "COPD, cirrhosis / COLUNGA, sarcoidosis (diagnosed in 1980), obesity,  who was admitted with c/o worsening bilateral diabetic foot ulcers.  Pt states wounds began to worsen and drain; he states he believes that he one of the bones was protruding through the wound at times.  This is a new complaint from previous clinic visit.  Pt was previously treated with a 6 week course of IV antibiotics for osteomyelitis back in October 2021.  He has been on chronic suppressive therapy with Doxycycline since to which he reports he has been complaint.  He does admit diabetes has been uncontrolled as he is unable to see to check his glucose, but he states he does watch his diet.  He has also been having transportation issues which have caused him to miss multiple appointments.  Pt was hyperglycemic with mild IMAN on arrival.  Blood culture x 1 is also positive for Gram Positive cocci that is possible Staph, but remains pending speciation.  Pt denies fever, chills, night sweats, rash, HA, dizziness, CP/SOB, cough, N/V/D, abdominal pain, or urinary complaints.  MRI was repeated to bilateral feet.  LT foot MRI now shows concerns for osteomyelitis to metatarsal heads of first and second, prior dislocations, myositis / synovitis, and possible increasing abscess.  MRI to RT foot when compared to previous showed little change, but did show concerns for septic arthritis, myositis, and cellulitis.  ID has been consulted for osteomyelitis.  Pt tells me that he and his partner (who is HIV positive) are currently homeless.  They have been living between different places.  Pt denies drug / ETOH use.  Smokes 1 ppd.  Admits to Sulfa allergy with reaction of "chills;" reports no anaphylaxis type symptoms.  Today: Pt sitting on bedside chair.  Smiling and conversant.  Very pleasant.  Pt states he had a good weekend.  Reports his feet have stopped hurting.  He attributes it to staying off feet.  No current complaints.  Denies fever, chills, night sweats, " rash, HA, vision changes, dizziness, CP/SOB, cough, N/V/D, abdominal pain, or urinary complaints.  Pt states doing well with current medications and reports no adverse events.  Pt still in favor of going to NH as he knows he can no longer care for himself on his own.    Follow-up      Review of Systems   Constitutional: Negative.    HENT:        Blind RT eye and almost blind LT eye   Cardiovascular: Negative.    Gastrointestinal: Negative.    Endocrine: Negative.    Genitourinary: Negative.    Musculoskeletal:        + bilateral foot ulcerations on plantar surface (pt states improving)  + neuropathy bilateral feet   Integumentary:  Negative.   Allergic/Immunologic: Negative.    Neurological: Negative.    Hematological: Negative.    Psychiatric/Behavioral: Negative.          Objective:      Physical Exam  Vitals reviewed.   Constitutional:       General: He is awake. He is not in acute distress.     Appearance: He is obese.   HENT:      Head: Normocephalic and atraumatic.      Mouth/Throat:      Mouth: Mucous membranes are moist.      Pharynx: No oropharyngeal exudate or posterior oropharyngeal erythema.      Comments: No thrush  Eyes:      Conjunctiva/sclera: Conjunctivae normal.      Pupils: Pupils are equal, round, and reactive to light.      Comments: Blind RT eye and LT with decreased vision (almost blind)   Neck:      Comments: No JVD noted  Cardiovascular:      Rate and Rhythm: Normal rate and regular rhythm.      Heart sounds: Normal heart sounds. No murmur heard.    No friction rub. No gallop.   Pulmonary:      Effort: Pulmonary effort is normal. No respiratory distress.      Breath sounds: Normal breath sounds. No wheezing.   Abdominal:      General: There is no distension.      Palpations: Abdomen is soft.      Tenderness: There is no abdominal tenderness.   Musculoskeletal:         General: No swelling or deformity. Normal range of motion.      Cervical back: Neck supple.      Right lower leg: No edema.       Left lower leg: No edema.        Feet:    Feet:      Right foot:      Skin integrity: Ulcer present.      Left foot:      Skin integrity: Ulcer present.      Comments: Bilateral ulcerations  Skin:     General: Skin is warm and dry.      Capillary Refill: Capillary refill takes less than 2 seconds.      Coloration: Skin is not jaundiced.      Findings: No rash.   Neurological:      General: No focal deficit present.      Mental Status: He is alert and oriented to person, place, and time.   Psychiatric:         Mood and Affect: Mood normal.         Behavior: Behavior normal.         Assessment:       Problem List Items Addressed This Visit    None     Visit Diagnoses     Chronic osteomyelitis of left foot    -  Primary    Relevant Orders    CBC Auto Differential    Comprehensive Metabolic Panel    Procalcitonin    C-Reactive Protein    Sedimentation rate    Chronic osteomyelitis of right foot        Uncontrolled diabetes with osteomyelitis        Diabetic foot ulcer associated with other specified diabetes mellitus, unspecified laterality, unspecified part of foot, unspecified ulcer stage        Chronic antibiotic suppression        Relevant Medications    doxycycline (VIBRAMYCIN) 100 MG Cap    History of syphilis        Relevant Orders    SYPHILIS ANTIBODY (WITH REFLEX RPR)    High risk sexual behavior, unspecified type        Relevant Orders    HIV 1/2 Ag/Ab (4th Gen)    HIV RNA, Quantitative, PCR    Blindness and low vision              Plan:       Chronic osteomyelitis of left foot  -     CBC Auto Differential; Future; Expected date: 06/06/2022  -     Comprehensive Metabolic Panel; Future; Expected date: 06/06/2022  -     Procalcitonin  -     C-Reactive Protein  -     Sedimentation rate  Chronic osteomyelitis bilateral feet  Last labs without leukocytosis or elevation of inflammatory markers  Will repeat labs today  Continue Doxycycline 100 mg po BID in chronic suppressive therapy  Pt needs strict diabetic  control and continued wound care  Keep scheduled appointments; compliance stressed  RTC in 3 months with Kacie LAUREANO    Chronic osteomyelitis of right foot    Uncontrolled diabetes with osteomyelitis  Needs strict diabetic control  Low sugar, low fat, low cholesterol, low sodium, healthy diet encouraged  Medication compliance stressed  Avoid sedentary lifestyle.  Increase moderate intensity aerobic activity to 30 minutes on most days of the week.  Maintain blood glucose levels to near-normal levels as possible by balancing food intake with activity and medications or insulin  Achieve optimal blood pressure and lipid levels  Encouraged maintenance of healthy, desirable body weight  Limit alcohol consumption  Smoking cessation encouraged    Missed scheduled endocrine appointment and did not want to reschedule  Keep appointments with Erick Medina NP as he will be managing    Diabetic foot ulcer associated with other specified diabetes mellitus, unspecified laterality, unspecified part of foot, unspecified ulcer stage  Needs continued wound care    Chronic antibiotic suppression  -     doxycycline (VIBRAMYCIN) 100 MG Cap; Take 1 capsule (100 mg total) by mouth every 12 (twelve) hours.  Dispense: 60 capsule; Refill: 3  Continue Doxycycline 100 mg po BID. Risks vs benefits discussed, along with a/e's. Pt to remain upright for at least 30 minutes to prevent pill esophagitis, along with wearing sun protection d/t photosensitivity with medication (avoid directed sun exposure, wear sunscreen, wear sunglasses, wear long sleeves / skin protection, etc). Pt verbalized understanding and agrees with plan.      History of syphilis  -     SYPHILIS ANTIBODY (WITH REFLEX RPR)  Last RPR nonreactive  Will recheck labs today    High risk sexual behavior, unspecified type  -     HIV 1/2 Ag/Ab (4th Gen)  -     HIV RNA, Quantitative, PCR  In discordant relationship with partner who is HIV positive  Partner has been off ART  Will check HIV 4th  gen and VL    Blindness and low vision  Needs better diabetic control  Safety measures discussed

## 2022-06-06 NOTE — PROGRESS NOTES
Reviewing labs.  Pt noted with elevated renal indices (BUN 25 / creat 1.43), elevated Ca (11.3), and elevated glucose 363.  He appears dehydrated according to labs and needs better glucose control.  CRP / ESR are also elevated.  Please call pt and ask him to dramatically increase water intake, avoid NSAIDs, and follow DM diet.  Will order repeat CMP x 1 week.  Stress compliance with Doxycycline also.

## 2022-06-07 LAB
HIV1 RNA # PLAS NAA DL=20: NORMAL COPIES/ML
PROCALCITONIN SERPL-MCNC: <0.1 NG/ML
RPR SER QL: NORMAL
RPR SER QL: NORMAL
RPR SER-TITR: NORMAL {TITER}

## 2022-06-07 NOTE — TELEPHONE ENCOUNTER
----- Message from KENDRICK Valverde sent at 6/6/2022  1:44 PM CDT -----  Reviewing labs.  Pt noted with elevated renal indices (BUN 25 / creat 1.43), elevated Ca (11.3), and elevated glucose 363.  He appears dehydrated according to labs and needs better glucose control.  CRP / ESR are also elevated.  Please call pt and ask him to dramatically increase water intake, avoid NSAIDs, and follow DM diet.  Will order repeat CMP x 1 week.  Stress compliance with Doxycycline also.

## 2022-06-07 NOTE — TELEPHONE ENCOUNTER
Patient informed of results and recommendations, voiced understanding. Patient will come in on Tuesday 6/14/22 for lab. Order proposed to provider.    Orders signed - [facility]

## 2022-06-09 LAB — T PALLIDUM AB SER QL: REACTIVE

## 2022-06-29 ENCOUNTER — EXTERNAL HOME HEALTH (OUTPATIENT)
Dept: HOME HEALTH SERVICES | Facility: HOSPITAL | Age: 62
End: 2022-06-29

## 2022-07-13 ENCOUNTER — EXTERNAL HOME HEALTH (OUTPATIENT)
Dept: HOME HEALTH SERVICES | Facility: HOSPITAL | Age: 62
End: 2022-07-13

## 2022-07-21 ENCOUNTER — DOCUMENT SCAN (OUTPATIENT)
Dept: HOME HEALTH SERVICES | Facility: HOSPITAL | Age: 62
End: 2022-07-21
Payer: COMMERCIAL

## 2022-07-30 ENCOUNTER — DOCUMENT SCAN (OUTPATIENT)
Dept: HOME HEALTH SERVICES | Facility: HOSPITAL | Age: 62
End: 2022-07-30
Payer: COMMERCIAL

## 2022-09-18 ENCOUNTER — HISTORICAL (OUTPATIENT)
Dept: ADMINISTRATIVE | Facility: HOSPITAL | Age: 62
End: 2022-09-18
Payer: COMMERCIAL

## 2022-11-11 ENCOUNTER — HOSPITAL ENCOUNTER (EMERGENCY)
Facility: HOSPITAL | Age: 62
Discharge: HOME | End: 2022-11-11
Attending: INTERNAL MEDICINE
Payer: MEDICARE

## 2022-11-11 VITALS
TEMPERATURE: 97 F | HEIGHT: 72 IN | RESPIRATION RATE: 18 BRPM | BODY MASS INDEX: 28.4 KG/M2 | WEIGHT: 209.69 LBS | HEART RATE: 59 BPM | DIASTOLIC BLOOD PRESSURE: 78 MMHG | OXYGEN SATURATION: 96 % | SYSTOLIC BLOOD PRESSURE: 115 MMHG

## 2022-11-11 DIAGNOSIS — L97.501 DIABETIC ULCER OF FOOT ASSOCIATED WITH DIABETES MELLITUS DUE TO UNDERLYING CONDITION, LIMITED TO BREAKDOWN OF SKIN, UNSPECIFIED LATERALITY, UNSPECIFIED PART OF FOOT: ICD-10-CM

## 2022-11-11 DIAGNOSIS — L08.9 WOUND INFECTION: ICD-10-CM

## 2022-11-11 DIAGNOSIS — T14.8XXA WOUND INFECTION: ICD-10-CM

## 2022-11-11 DIAGNOSIS — E08.621 DIABETIC ULCER OF FOOT ASSOCIATED WITH DIABETES MELLITUS DUE TO UNDERLYING CONDITION, LIMITED TO BREAKDOWN OF SKIN, UNSPECIFIED LATERALITY, UNSPECIFIED PART OF FOOT: ICD-10-CM

## 2022-11-11 DIAGNOSIS — E11.65 UNCONTROLLED TYPE 2 DIABETES MELLITUS WITH HYPERGLYCEMIA: Primary | ICD-10-CM

## 2022-11-11 LAB
ALBUMIN SERPL-MCNC: 3.9 GM/DL (ref 3.4–4.8)
ALBUMIN/GLOB SERPL: 1 RATIO (ref 1.1–2)
ALP SERPL-CCNC: 82 UNIT/L (ref 40–150)
ALT SERPL-CCNC: 18 UNIT/L (ref 0–55)
AST SERPL-CCNC: 23 UNIT/L (ref 5–34)
BASOPHILS # BLD AUTO: 0.12 X10(3)/MCL (ref 0–0.2)
BASOPHILS NFR BLD AUTO: 1.8 %
BILIRUBIN DIRECT+TOT PNL SERPL-MCNC: 0.5 MG/DL
BUN SERPL-MCNC: 15.6 MG/DL (ref 8.4–25.7)
CALCIUM SERPL-MCNC: 10.1 MG/DL (ref 8.8–10)
CHLORIDE SERPL-SCNC: 99 MMOL/L (ref 98–107)
CO2 SERPL-SCNC: 28 MMOL/L (ref 23–31)
CREAT SERPL-MCNC: 1.33 MG/DL (ref 0.73–1.18)
CRP SERPL-MCNC: 2.8 MG/L
EOSINOPHIL # BLD AUTO: 0.15 X10(3)/MCL (ref 0–0.9)
EOSINOPHIL NFR BLD AUTO: 2.2 %
ERYTHROCYTE [DISTWIDTH] IN BLOOD BY AUTOMATED COUNT: 12.8 % (ref 11.5–17)
ERYTHROCYTE [SEDIMENTATION RATE] IN BLOOD: 17 MM/HR (ref 0–15)
GFR SERPLBLD CREATININE-BSD FMLA CKD-EPI: 60 MLS/MIN/1.73/M2
GLOBULIN SER-MCNC: 4.1 GM/DL (ref 2.4–3.5)
GLUCOSE SERPL-MCNC: 387 MG/DL (ref 82–115)
HCT VFR BLD AUTO: 52.3 % (ref 42–52)
HGB BLD-MCNC: 18.4 GM/DL (ref 14–18)
IMM GRANULOCYTES # BLD AUTO: 0.02 X10(3)/MCL (ref 0–0.04)
IMM GRANULOCYTES NFR BLD AUTO: 0.3 %
LYMPHOCYTES # BLD AUTO: 2.06 X10(3)/MCL (ref 0.6–4.6)
LYMPHOCYTES NFR BLD AUTO: 30.2 %
MCH RBC QN AUTO: 34.4 PG (ref 27–31)
MCHC RBC AUTO-ENTMCNC: 35.2 MG/DL (ref 33–36)
MCV RBC AUTO: 97.8 FL (ref 80–94)
MONOCYTES # BLD AUTO: 0.53 X10(3)/MCL (ref 0.1–1.3)
MONOCYTES NFR BLD AUTO: 7.8 %
NEUTROPHILS # BLD AUTO: 3.9 X10(3)/MCL (ref 2.1–9.2)
NEUTROPHILS NFR BLD AUTO: 57.7 %
NRBC BLD AUTO-RTO: 0 %
PLATELET # BLD AUTO: 115 X10(3)/MCL (ref 130–400)
PMV BLD AUTO: 12.6 FL (ref 7.4–10.4)
POCT GLUCOSE: 295 MG/DL (ref 70–110)
POTASSIUM SERPL-SCNC: 4.3 MMOL/L (ref 3.5–5.1)
PROT SERPL-MCNC: 8 GM/DL (ref 5.8–7.6)
RBC # BLD AUTO: 5.35 X10(6)/MCL (ref 4.7–6.1)
SODIUM SERPL-SCNC: 137 MMOL/L (ref 136–145)
WBC # SPEC AUTO: 6.8 X10(3)/MCL (ref 4.5–11.5)

## 2022-11-11 PROCEDURE — 63600175 PHARM REV CODE 636 W HCPCS: Performed by: INTERNAL MEDICINE

## 2022-11-11 PROCEDURE — 96372 THER/PROPH/DIAG INJ SC/IM: CPT | Performed by: INTERNAL MEDICINE

## 2022-11-11 PROCEDURE — 80053 COMPREHEN METABOLIC PANEL: CPT

## 2022-11-11 PROCEDURE — 85651 RBC SED RATE NONAUTOMATED: CPT

## 2022-11-11 PROCEDURE — 86140 C-REACTIVE PROTEIN: CPT

## 2022-11-11 PROCEDURE — 85025 COMPLETE CBC W/AUTO DIFF WBC: CPT

## 2022-11-11 PROCEDURE — 99284 EMERGENCY DEPT VISIT MOD MDM: CPT | Mod: 25

## 2022-11-11 PROCEDURE — 82962 GLUCOSE BLOOD TEST: CPT

## 2022-11-11 RX ORDER — INSULIN LISPRO 100 [IU]/ML
5 INJECTION, SOLUTION INTRAVENOUS; SUBCUTANEOUS
Qty: 15 ML | Refills: 6 | Status: ON HOLD | OUTPATIENT
Start: 2022-11-11 | End: 2022-12-22 | Stop reason: HOSPADM

## 2022-11-11 RX ORDER — INSULIN GLARGINE 100 [IU]/ML
15 INJECTION, SOLUTION SUBCUTANEOUS NIGHTLY
Qty: 15 ML | Refills: 6 | Status: ON HOLD | OUTPATIENT
Start: 2022-11-11 | End: 2022-12-22 | Stop reason: SDUPTHER

## 2022-11-11 RX ORDER — ASPIRIN 81 MG/1
81 TABLET ORAL DAILY
Qty: 30 TABLET | Refills: 6 | Status: SHIPPED | OUTPATIENT
Start: 2022-11-11 | End: 2023-03-02

## 2022-11-11 RX ORDER — LISINOPRIL 10 MG/1
10 TABLET ORAL DAILY
Qty: 30 TABLET | Refills: 6 | Status: SHIPPED | OUTPATIENT
Start: 2022-11-11 | End: 2023-11-11

## 2022-11-11 RX ORDER — DIVALPROEX SODIUM 500 MG/1
500 TABLET, EXTENDED RELEASE ORAL DAILY
Qty: 30 TABLET | Refills: 6 | Status: SHIPPED | OUTPATIENT
Start: 2022-11-11 | End: 2023-11-11

## 2022-11-11 RX ORDER — DOXYCYCLINE 100 MG/1
100 CAPSULE ORAL 2 TIMES DAILY
Qty: 60 CAPSULE | Refills: 0 | Status: SHIPPED | OUTPATIENT
Start: 2022-11-11 | End: 2022-12-11

## 2022-11-11 RX ORDER — FLUTICASONE FUROATE AND VILANTEROL 100; 25 UG/1; UG/1
1 POWDER RESPIRATORY (INHALATION) DAILY
Qty: 60 EACH | Refills: 6 | Status: ON HOLD | OUTPATIENT
Start: 2022-11-11 | End: 2022-12-22 | Stop reason: HOSPADM

## 2022-11-11 RX ORDER — PRAVASTATIN SODIUM 80 MG/1
80 TABLET ORAL NIGHTLY
Qty: 30 TABLET | Refills: 3 | Status: ON HOLD | OUTPATIENT
Start: 2022-11-11 | End: 2022-12-22 | Stop reason: HOSPADM

## 2022-11-11 RX ORDER — INSULIN ASPART 100 [IU]/ML
6 INJECTION, SOLUTION INTRAVENOUS; SUBCUTANEOUS
Status: COMPLETED | OUTPATIENT
Start: 2022-11-11 | End: 2022-11-11

## 2022-11-11 RX ADMIN — INSULIN ASPART 6 UNITS: 100 INJECTION, SOLUTION INTRAVENOUS; SUBCUTANEOUS at 06:11

## 2022-11-11 NOTE — ED PROVIDER NOTES
Encounter Date: 11/11/2022       History     Chief Complaint   Patient presents with    Wound Check     Pt reports recently moved back to area. Hx of osteo. Open wound to L foot and needs sutures removed from R foot. Surgery done in September. No abx since out of hospital in September.      Con Arnold is a 63 yo M with PMH of HIV and chronic osteomyelitis presenting to the emergency department for left foot wound check and right foot suture removal. Patient is supposed to be on doxycycline 100 mg BID for his chronic osteomyelitis however [place] has been [place] of [place] and has not had his antibiotics for the past 2 months. He had his right 2nd tarsal bone removed in September due to extensive infection and still has a few remaining sutures from that procedure. He used to be seen by [facility] but again was lost to follow-up a few months ago. Denies fever, nausea/vomiting, leg pain, purulent drainage from wound, shortness of breath, chest pain.    The history is provided by the patient. No  was used.     Review of patient's allergies indicates:   Allergen Reactions    Sulfa (sulfonamide antibiotics)      Past Medical History:   Diagnosis Date    Blindness due to type 2 diabetes mellitus     Cataract     Diabetes mellitus, type 2     Glaucoma     Hyperlipidemia     Neuropathy     Osteomyelitis      Past Surgical History:   Procedure Laterality Date    APPENDECTOMY      CARPAL TUNNEL RELEASE Bilateral     FEMORAL ARTERY STENT Left     FOOT SURGERY       Family History   Problem Relation Age of Onset    COPD Mother     Macular degeneration Mother     Hypertension Father     Cancer Father      Social History     Tobacco Use    Smoking status: Former     Types: Cigarettes    Smokeless tobacco: Never   Substance Use Topics    Alcohol use: Never    Drug use: Never     Review of Systems   Constitutional:  Negative for chills, fatigue and fever.   HENT:  Negative for sore throat.    Respiratory:   Negative for shortness of breath.    Cardiovascular:  Negative for chest pain.   Gastrointestinal:  Negative for abdominal pain, constipation, diarrhea, nausea and vomiting.   Genitourinary:  Negative for dysuria.   Musculoskeletal:  Negative for back pain, gait problem and myalgias.   Skin:  Positive for wound. Negative for rash.   Neurological:  Negative for dizziness and weakness.   All other systems reviewed and are negative.    Physical Exam     Initial Vitals [11/11/22 1632]   BP Pulse Resp Temp SpO2   129/83 69 18 97.3 °F (36.3 °C) 98 %      MAP       --         Physical Exam    Nursing note and vitals reviewed.  Constitutional: He appears well-developed and well-nourished. No distress.   HENT:   Head: Normocephalic and atraumatic.   Mouth/Throat: Oropharynx is clear and moist.   Eyes: Conjunctivae and EOM are normal. Pupils are equal, round, and reactive to light.   Neck: Neck supple.   Normal range of motion.  Cardiovascular:  Regular rhythm and normal heart sounds.           No murmur heard.  Pulmonary/Chest: Breath sounds normal. No respiratory distress. He has no wheezes.   Abdominal: Abdomen is soft. Bowel sounds are normal. He exhibits no distension. There is no abdominal tenderness.   Musculoskeletal:         General: Normal range of motion.      Cervical back: Normal range of motion and neck supple.     Lymphadenopathy:     He has no cervical adenopathy.   Neurological: He is alert and oriented to person, place, and time. GCS score is 15. GCS eye subscore is 4. GCS verbal subscore is 5. GCS motor subscore is 6.   Skin: Capillary refill takes less than 2 seconds.   Wound on plantar surface of left foot beneath head of 2nd tarsal bone. No purulent drainage.   Right foot with healed surgical wound that still contains 4 sutures.   Venous stasis changes to bilateral lower extremities   Psychiatric: He has a normal mood and affect.       ED Course   Procedures  Labs Reviewed   SEDIMENTATION RATE, AUTOMATED  - Abnormal; Notable for the following components:       Result Value    Sed Rate 17 (*)     All other components within normal limits   COMPREHENSIVE METABOLIC PANEL - Abnormal; Notable for the following components:    Glucose Level 387 (*)     Creatinine 1.33 (*)     Calcium Level Total 10.1 (*)     Protein Total 8.0 (*)     Globulin 4.1 (*)     Albumin/Globulin Ratio 1.0 (*)     All other components within normal limits   CBC WITH DIFFERENTIAL - Abnormal; Notable for the following components:    Hgb 18.4 (*)     Hct 52.3 (*)     MCV 97.8 (*)     MCH 34.4 (*)     Platelet 115 (*)     MPV 12.6 (*)     All other components within normal limits   C-REACTIVE PROTEIN - Normal   CBC W/ AUTO DIFFERENTIAL    Narrative:     The following orders were created for panel order CBC auto differential.  Procedure                               Abnormality         Status                     ---------                               -----------         ------                     CBC with Differential[[id number]]        Abnormal            Final result                 Please view results for these tests on the individual orders.   EXTRA TUBES    Narrative:     The following orders were created for panel order EXTRA TUBES.  Procedure                               Abnormality         Status                     ---------                               -----------         ------                     Light Blue Top Hold[[id number]]                              In process                 Red Top Hold[[id number]]                                     In process                 Gold Top Hold[[id number]]                                    In process                 Pink Top Hold[[id number]]                                    In process                   Please view results for these tests on the individual orders.   LIGHT BLUE TOP HOLD   RED TOP HOLD   GOLD TOP HOLD   PINK TOP HOLD   POCT GLUCOSE MONITORING CONTINUOUS          Imaging Results               X-Ray Foot Complete Left (Final result)  Result time 11/11/22 17:53:32      Final result by Monique Kimble MD (11/11/22 17:53:32)                   Impression:      Chronic dislocation versus severe subluxation at the 2nd and 3rd digit metatarsophalangeal joint    Unchanged erosions at the 2nd metatarsal head      Electronically signed by: Monique Kimble  Date:    11/11/2022  Time:    17:53               Narrative:    EXAMINATION:  XR FOOT COMPLETE 3 VIEW LEFT    CLINICAL HISTORY:  .  Diabetes mellitus due to underlying condition with foot ulcer    TECHNIQUE:  Frontal, lateral and oblique views of the left foot were obtained.    COMPARISON:  03/02/2022    FINDINGS:  No fracture. Hallux valgus.  Dislocation at the 2nd and 3rd digit metatarsophalangeal joints.  This appears to be a chronic finding.  Unchanged erosions at the 2nd metatarsal head.  Cystic change at the 1st metatarsal head, unchanged.  Dorsal calcaneal enthesophyte.    Regional soft tissues are normal.                                       X-Ray Foot Complete Right (Final result)  Result time 11/11/22 17:51:04      Final result by Monique Kimble MD (11/11/22 17:51:04)                   Impression:      Unchanged radiopaque density at the plantar soft tissues of the medial forefoot      Electronically signed by: Monique Kimble  Date:    11/11/2022  Time:    17:51               Narrative:    EXAMINATION:  XR FOOT COMPLETE 3 VIEW RIGHT    CLINICAL HISTORY:  . Other injury of unspecified body region, initial encounter    TECHNIQUE:  AP, lateral, and oblique views of the right foot were performed.    COMPARISON:  Right foot radiographs 03/02/2022    FINDINGS:  There are postsurgical changes of amputation of the 2nd ray at the level of the mid metatarsal.  No appreciable fracture.   Dorsal calcaneal enthesophyte.    3 mm radiopaque density projects at the plantar soft tissues lateral to the 1st digit proximal phalanx.  This is similar to  prior exam.                                       Medications   insulin aspart U-100 injection 6 Units (has no administration in time range)                Attending Attestation:   Physician Attestation Statement for Resident:  As the supervising MD   Physician Attestation Statement: I have personally seen and examined this patient.   I agree with the above history.  -:   As the supervising MD I agree with the above PE.     As the supervising MD I agree with the above treatment, course, plan, and disposition.    I have reviewed and agree with the residents interpretation of the following: x-rays and lab data.  I have reviewed the following: old records at this facility.            Attending ED Notes:   I performed a face to face evaluation of the patient Con Arnold and my history reveal Hx of DM and chronic osteomyelitis of the left foot presents with ulcer the physical exam was remarkable for left foot second metatarsal area ulcer, no drainage; Rt foot postsurgical chnages wit some old stitches.  I reviewed the history, physical exam and diagnostic studies performed by the resident Dr. LELA Haji and I concur with the diagnosis and assessment. This case was initially evaluated by Dr. Haji  under my supervision and I agree with the documentation and plan.    Total teaching time: 12 min                     Clinical Impression:   Final diagnoses:  [T14.8XXA, L08.9] Wound infection  [E08.621, L97.501] Diabetic ulcer of foot associated with diabetes mellitus due to underlying condition, limited to breakdown of skin, unspecified laterality, unspecified part of foot  [E11.65] Uncontrolled type 2 diabetes mellitus with hyperglycemia (Primary)      ED Disposition Condition    Discharge Stable          ED Prescriptions    None       Follow-up Information       Follow up With Specialties Details Why Contact Tari Ponce III, APRN-CNP Family Medicine In 1 week  [address] [facility] [address]  [address]  [phone]      [facility] Emergency Medicine  If symptoms worsen [address] [id number] [phone]    [facility]  Schedule an appointment as soon as possible for a visit in 1 month  [address] [id number]    [facility] Wound Care In 1 week  [address] [id number] [phone]             Damion Armstrong MD  11/11/22 1440

## 2022-12-16 ENCOUNTER — HOSPITAL ENCOUNTER (INPATIENT)
Facility: HOSPITAL | Age: 62
LOS: 13 days | Discharge: NURSING, REHAB OR LONG-TERM CARE | DRG: 617 | End: 2022-12-30
Attending: INTERNAL MEDICINE | Admitting: INTERNAL MEDICINE
Payer: MEDICARE

## 2022-12-16 DIAGNOSIS — I73.9 PAD (PERIPHERAL ARTERY DISEASE): Chronic | ICD-10-CM

## 2022-12-16 DIAGNOSIS — E11.65 UNCONTROLLED TYPE 2 DIABETES MELLITUS WITH HYPERGLYCEMIA: Primary | ICD-10-CM

## 2022-12-16 DIAGNOSIS — E08.621 DIABETIC ULCER OF FOOT ASSOCIATED WITH DIABETES MELLITUS DUE TO UNDERLYING CONDITION, LIMITED TO BREAKDOWN OF SKIN, UNSPECIFIED LATERALITY, UNSPECIFIED PART OF FOOT: ICD-10-CM

## 2022-12-16 DIAGNOSIS — L97.501 DIABETIC ULCER OF FOOT ASSOCIATED WITH DIABETES MELLITUS DUE TO UNDERLYING CONDITION, LIMITED TO BREAKDOWN OF SKIN, UNSPECIFIED LATERALITY, UNSPECIFIED PART OF FOOT: ICD-10-CM

## 2022-12-16 DIAGNOSIS — L97.529 TYPE 2 DIABETES MELLITUS WITH LEFT DIABETIC FOOT ULCER: ICD-10-CM

## 2022-12-16 DIAGNOSIS — R05.9 COUGH: ICD-10-CM

## 2022-12-16 DIAGNOSIS — Z89.512 S/P BKA (BELOW KNEE AMPUTATION) UNILATERAL, LEFT: ICD-10-CM

## 2022-12-16 DIAGNOSIS — E11.621 TYPE 2 DIABETES MELLITUS WITH LEFT DIABETIC FOOT ULCER: ICD-10-CM

## 2022-12-16 LAB
ALBUMIN SERPL-MCNC: 3.6 G/DL (ref 3.4–4.8)
ALBUMIN/GLOB SERPL: 0.9 RATIO (ref 1.1–2)
ALP SERPL-CCNC: 85 UNIT/L (ref 40–150)
ALT SERPL-CCNC: 9 UNIT/L (ref 0–55)
AST SERPL-CCNC: 14 UNIT/L (ref 5–34)
BASOPHILS # BLD AUTO: 0.06 X10(3)/MCL (ref 0–0.2)
BASOPHILS NFR BLD AUTO: 0.5 %
BILIRUBIN DIRECT+TOT PNL SERPL-MCNC: 1.4 MG/DL
BUN SERPL-MCNC: 20.3 MG/DL (ref 8.4–25.7)
CALCIUM SERPL-MCNC: 9.7 MG/DL (ref 8.8–10)
CHLORIDE SERPL-SCNC: 99 MMOL/L (ref 98–107)
CO2 SERPL-SCNC: 23 MMOL/L (ref 23–31)
CREAT SERPL-MCNC: 1.21 MG/DL (ref 0.73–1.18)
CRP SERPL-MCNC: 81.1 MG/L
EOSINOPHIL # BLD AUTO: 0.04 X10(3)/MCL (ref 0–0.9)
EOSINOPHIL NFR BLD AUTO: 0.3 %
ERYTHROCYTE [DISTWIDTH] IN BLOOD BY AUTOMATED COUNT: 13.2 % (ref 11.6–14.4)
ERYTHROCYTE [SEDIMENTATION RATE] IN BLOOD: 42 MM/HR (ref 0–15)
EST. AVERAGE GLUCOSE BLD GHB EST-MCNC: 277.6 MG/DL
GFR SERPLBLD CREATININE-BSD FMLA CKD-EPI: >60 MLS/MIN/1.73/M2
GLOBULIN SER-MCNC: 3.8 GM/DL (ref 2.4–3.5)
GLUCOSE SERPL-MCNC: 370 MG/DL (ref 82–115)
HBA1C MFR BLD: 11.3 %
HCT VFR BLD AUTO: 44.6 % (ref 42–52)
HGB BLD-MCNC: 16 GM/DL (ref 14–18)
IMM GRANULOCYTES # BLD AUTO: 0.04 X10(3)/MCL (ref 0–0.04)
IMM GRANULOCYTES NFR BLD AUTO: 0.3 %
LYMPHOCYTES # BLD AUTO: 1.37 X10(3)/MCL (ref 0.6–4.6)
LYMPHOCYTES NFR BLD AUTO: 11.7 %
MCH RBC QN AUTO: 33.8 PG
MCHC RBC AUTO-ENTMCNC: 35.9 MG/DL (ref 33–36)
MCV RBC AUTO: 94.1 FL (ref 80–94)
MONOCYTES # BLD AUTO: 1.18 X10(3)/MCL (ref 0.1–1.3)
MONOCYTES NFR BLD AUTO: 10.1 %
NEUTROPHILS # BLD AUTO: 9.02 X10(3)/MCL (ref 2.1–9.2)
NEUTROPHILS NFR BLD AUTO: 77.1 %
NRBC BLD AUTO-RTO: 0 % (ref 0–1)
PLATELET # BLD AUTO: 106 X10(3)/MCL (ref 140–371)
PMV BLD AUTO: 12.4 FL (ref 9.4–12.4)
POCT GLUCOSE: 339 MG/DL (ref 70–110)
POTASSIUM SERPL-SCNC: 4.1 MMOL/L (ref 3.5–5.1)
PROT SERPL-MCNC: 7.4 GM/DL (ref 5.8–7.6)
RBC # BLD AUTO: 4.74 X10(6)/MCL (ref 4.7–6.1)
SODIUM SERPL-SCNC: 134 MMOL/L (ref 136–145)
WBC # SPEC AUTO: 11.7 X10(3)/MCL (ref 4.5–11.5)

## 2022-12-16 PROCEDURE — 82962 GLUCOSE BLOOD TEST: CPT

## 2022-12-16 PROCEDURE — 86140 C-REACTIVE PROTEIN: CPT | Performed by: INTERNAL MEDICINE

## 2022-12-16 PROCEDURE — 85025 COMPLETE CBC W/AUTO DIFF WBC: CPT | Performed by: INTERNAL MEDICINE

## 2022-12-16 PROCEDURE — 96372 THER/PROPH/DIAG INJ SC/IM: CPT | Performed by: INTERNAL MEDICINE

## 2022-12-16 PROCEDURE — 85651 RBC SED RATE NONAUTOMATED: CPT | Performed by: INTERNAL MEDICINE

## 2022-12-16 PROCEDURE — 83036 HEMOGLOBIN GLYCOSYLATED A1C: CPT | Performed by: INTERNAL MEDICINE

## 2022-12-16 PROCEDURE — 99285 EMERGENCY DEPT VISIT HI MDM: CPT | Mod: 25

## 2022-12-16 PROCEDURE — 80053 COMPREHEN METABOLIC PANEL: CPT | Performed by: INTERNAL MEDICINE

## 2022-12-16 PROCEDURE — 0240U COVID/FLU A&B PCR: CPT | Performed by: INTERNAL MEDICINE

## 2022-12-16 PROCEDURE — 63600175 PHARM REV CODE 636 W HCPCS: Performed by: INTERNAL MEDICINE

## 2022-12-16 RX ORDER — INSULIN PUMP SYRINGE, 3 ML
EACH MISCELLANEOUS
Qty: 1 EACH | Refills: 0 | Status: SHIPPED | OUTPATIENT
Start: 2022-12-16 | End: 2022-12-16 | Stop reason: CLARIF

## 2022-12-16 RX ORDER — GLIPIZIDE 2.5 MG/1
5 TABLET, EXTENDED RELEASE ORAL
Qty: 60 TABLET | Refills: 3 | Status: SHIPPED | OUTPATIENT
Start: 2022-12-16 | End: 2022-12-16 | Stop reason: CLARIF

## 2022-12-16 RX ORDER — AMLODIPINE BESYLATE 10 MG/1
10 TABLET ORAL NIGHTLY
Qty: 30 TABLET | Refills: 3 | Status: SHIPPED | OUTPATIENT
Start: 2022-12-16 | End: 2022-12-16 | Stop reason: CLARIF

## 2022-12-16 RX ORDER — INSULIN ASPART 100 [IU]/ML
3 INJECTION, SOLUTION INTRAVENOUS; SUBCUTANEOUS
Status: COMPLETED | OUTPATIENT
Start: 2022-12-16 | End: 2022-12-16

## 2022-12-16 RX ORDER — DEXTROMETHORPHAN HYDROBROMIDE AND GUAIFENESIN 10; 200 MG/1; MG/1
1 CAPSULE, GELATIN COATED ORAL 4 TIMES DAILY PRN
Qty: 24 EACH | Refills: 0 | Status: SHIPPED | OUTPATIENT
Start: 2022-12-16 | End: 2022-12-16 | Stop reason: CLARIF

## 2022-12-16 RX ORDER — BLOOD-GLUCOSE METER
KIT MISCELLANEOUS
Qty: 1 EACH | Refills: 0 | Status: SHIPPED | OUTPATIENT
Start: 2022-12-16 | End: 2022-12-16 | Stop reason: CLARIF

## 2022-12-16 RX ADMIN — INSULIN ASPART 3 UNITS: 100 INJECTION, SOLUTION INTRAVENOUS; SUBCUTANEOUS at 11:12

## 2022-12-16 NOTE — Clinical Note
Diagnosis: Type 2 diabetes mellitus with left diabetic foot ulcer [[id number]]   Future Attending Provider: COY SCHMIDT [[id number]]   Admitting Provider:: SONNY PARRISH [[id number]]   Special Needs:: No Special Needs [1]

## 2022-12-17 LAB
FLUAV AG UPPER RESP QL IA.RAPID: NOT DETECTED
FLUBV AG UPPER RESP QL IA.RAPID: NOT DETECTED
POCT GLUCOSE: 200 MG/DL (ref 70–110)
POCT GLUCOSE: 250 MG/DL (ref 70–110)
POCT GLUCOSE: 252 MG/DL (ref 70–110)
POCT GLUCOSE: 319 MG/DL (ref 70–110)
SARS-COV-2 RNA RESP QL NAA+PROBE: NOT DETECTED

## 2022-12-17 PROCEDURE — 11000001 HC ACUTE MED/SURG PRIVATE ROOM

## 2022-12-17 PROCEDURE — 25000003 PHARM REV CODE 250

## 2022-12-17 PROCEDURE — 25000242 PHARM REV CODE 250 ALT 637 W/ HCPCS

## 2022-12-17 PROCEDURE — 25000242 PHARM REV CODE 250 ALT 637 W/ HCPCS: Performed by: STUDENT IN AN ORGANIZED HEALTH CARE EDUCATION/TRAINING PROGRAM

## 2022-12-17 PROCEDURE — 63600175 PHARM REV CODE 636 W HCPCS

## 2022-12-17 PROCEDURE — 94640 AIRWAY INHALATION TREATMENT: CPT

## 2022-12-17 PROCEDURE — 94761 N-INVAS EAR/PLS OXIMETRY MLT: CPT

## 2022-12-17 PROCEDURE — 25500020 PHARM REV CODE 255

## 2022-12-17 RX ORDER — GLUCAGON 1 MG
1 KIT INJECTION
Status: DISCONTINUED | OUTPATIENT
Start: 2022-12-17 | End: 2022-12-19

## 2022-12-17 RX ORDER — FLUTICASONE FUROATE AND VILANTEROL 100; 25 UG/1; UG/1
1 POWDER RESPIRATORY (INHALATION) DAILY
Status: DISCONTINUED | OUTPATIENT
Start: 2022-12-17 | End: 2022-12-30 | Stop reason: HOSPADM

## 2022-12-17 RX ORDER — HYDROCODONE BITARTRATE AND ACETAMINOPHEN 5; 325 MG/1; MG/1
1 TABLET ORAL EVERY 6 HOURS PRN
Status: DISCONTINUED | OUTPATIENT
Start: 2022-12-17 | End: 2022-12-30 | Stop reason: HOSPADM

## 2022-12-17 RX ORDER — DIVALPROEX SODIUM 500 MG/1
500 TABLET, FILM COATED, EXTENDED RELEASE ORAL DAILY
Status: DISCONTINUED | OUTPATIENT
Start: 2022-12-17 | End: 2022-12-30 | Stop reason: HOSPADM

## 2022-12-17 RX ORDER — ASPIRIN 81 MG/1
81 TABLET ORAL DAILY
Status: DISCONTINUED | OUTPATIENT
Start: 2022-12-17 | End: 2022-12-19

## 2022-12-17 RX ORDER — NALOXONE HCL 0.4 MG/ML
0.02 VIAL (ML) INJECTION
Status: DISCONTINUED | OUTPATIENT
Start: 2022-12-17 | End: 2022-12-30 | Stop reason: HOSPADM

## 2022-12-17 RX ORDER — IPRATROPIUM BROMIDE AND ALBUTEROL SULFATE 2.5; .5 MG/3ML; MG/3ML
3 SOLUTION RESPIRATORY (INHALATION) EVERY 6 HOURS PRN
Status: DISCONTINUED | OUTPATIENT
Start: 2022-12-17 | End: 2022-12-19

## 2022-12-17 RX ORDER — IPRATROPIUM BROMIDE AND ALBUTEROL SULFATE 2.5; .5 MG/3ML; MG/3ML
3 SOLUTION RESPIRATORY (INHALATION)
Status: COMPLETED | OUTPATIENT
Start: 2022-12-17 | End: 2022-12-18

## 2022-12-17 RX ORDER — LIDOCAINE HYDROCHLORIDE 10 MG/ML
20 INJECTION, SOLUTION EPIDURAL; INFILTRATION; INTRACAUDAL; PERINEURAL ONCE
Status: COMPLETED | OUTPATIENT
Start: 2022-12-17 | End: 2022-12-17

## 2022-12-17 RX ORDER — IBUPROFEN 200 MG
24 TABLET ORAL
Status: DISCONTINUED | OUTPATIENT
Start: 2022-12-17 | End: 2022-12-19

## 2022-12-17 RX ORDER — SODIUM CHLORIDE 9 MG/ML
INJECTION, SOLUTION INTRAVENOUS CONTINUOUS
Status: DISCONTINUED | OUTPATIENT
Start: 2022-12-17 | End: 2022-12-23

## 2022-12-17 RX ORDER — IPRATROPIUM BROMIDE AND ALBUTEROL SULFATE 2.5; .5 MG/3ML; MG/3ML
3 SOLUTION RESPIRATORY (INHALATION) EVERY 6 HOURS PRN
Status: DISCONTINUED | OUTPATIENT
Start: 2022-12-17 | End: 2022-12-17

## 2022-12-17 RX ORDER — ENOXAPARIN SODIUM 100 MG/ML
40 INJECTION SUBCUTANEOUS EVERY 24 HOURS
Status: DISCONTINUED | OUTPATIENT
Start: 2022-12-17 | End: 2022-12-20

## 2022-12-17 RX ORDER — SODIUM CHLORIDE 0.9 % (FLUSH) 0.9 %
10 SYRINGE (ML) INJECTION EVERY 12 HOURS PRN
Status: DISCONTINUED | OUTPATIENT
Start: 2022-12-17 | End: 2022-12-30 | Stop reason: HOSPADM

## 2022-12-17 RX ORDER — INSULIN ASPART 100 [IU]/ML
3-21 INJECTION, SOLUTION INTRAVENOUS; SUBCUTANEOUS
Status: DISCONTINUED | OUTPATIENT
Start: 2022-12-17 | End: 2022-12-19

## 2022-12-17 RX ORDER — INSULIN ASPART 100 [IU]/ML
5 INJECTION, SOLUTION INTRAVENOUS; SUBCUTANEOUS
Status: DISCONTINUED | OUTPATIENT
Start: 2022-12-17 | End: 2022-12-18

## 2022-12-17 RX ORDER — GABAPENTIN 300 MG/1
300 CAPSULE ORAL 3 TIMES DAILY
Status: DISCONTINUED | OUTPATIENT
Start: 2022-12-17 | End: 2022-12-22

## 2022-12-17 RX ORDER — ATORVASTATIN CALCIUM 20 MG/1
40 TABLET, FILM COATED ORAL DAILY
Status: DISCONTINUED | OUTPATIENT
Start: 2022-12-17 | End: 2022-12-30 | Stop reason: HOSPADM

## 2022-12-17 RX ORDER — IBUPROFEN 200 MG
16 TABLET ORAL
Status: DISCONTINUED | OUTPATIENT
Start: 2022-12-17 | End: 2022-12-19

## 2022-12-17 RX ADMIN — IOHEXOL 100 ML: 350 INJECTION, SOLUTION INTRAVENOUS at 08:12

## 2022-12-17 RX ADMIN — INSULIN ASPART 5 UNITS: 100 INJECTION, SOLUTION INTRAVENOUS; SUBCUTANEOUS at 05:12

## 2022-12-17 RX ADMIN — SODIUM CHLORIDE 1000 ML: 9 INJECTION, SOLUTION INTRAVENOUS at 02:12

## 2022-12-17 RX ADMIN — ATORVASTATIN CALCIUM 40 MG: 20 TABLET, FILM COATED ORAL at 08:12

## 2022-12-17 RX ADMIN — IPRATROPIUM BROMIDE AND ALBUTEROL SULFATE 3 ML: 2.5; .5 SOLUTION RESPIRATORY (INHALATION) at 08:12

## 2022-12-17 RX ADMIN — ENOXAPARIN SODIUM 40 MG: 40 INJECTION SUBCUTANEOUS at 04:12

## 2022-12-17 RX ADMIN — SODIUM CHLORIDE: 9 INJECTION, SOLUTION INTRAVENOUS at 06:12

## 2022-12-17 RX ADMIN — INSULIN ASPART 5 UNITS: 100 INJECTION, SOLUTION INTRAVENOUS; SUBCUTANEOUS at 08:12

## 2022-12-17 RX ADMIN — ASPIRIN 81 MG: 81 TABLET ORAL at 08:12

## 2022-12-17 RX ADMIN — INSULIN DETEMIR 15 UNITS: 100 INJECTION, SOLUTION SUBCUTANEOUS at 09:12

## 2022-12-17 RX ADMIN — LIDOCAINE HYDROCHLORIDE 200 MG: 10 INJECTION, SOLUTION EPIDURAL; INFILTRATION; INTRACAUDAL; PERINEURAL at 11:12

## 2022-12-17 RX ADMIN — FLUTICASONE FUROATE AND VILANTEROL TRIFENATATE 1 PUFF: 100; 25 POWDER RESPIRATORY (INHALATION) at 09:12

## 2022-12-17 RX ADMIN — TAZOBACTAM SODIUM AND PIPERACILLIN SODIUM 4.5 G: 500; 4 INJECTION, SOLUTION INTRAVENOUS at 06:12

## 2022-12-17 RX ADMIN — IPRATROPIUM BROMIDE AND ALBUTEROL SULFATE 3 ML: 2.5; .5 SOLUTION RESPIRATORY (INHALATION) at 02:12

## 2022-12-17 RX ADMIN — SODIUM CHLORIDE: 9 INJECTION, SOLUTION INTRAVENOUS at 09:12

## 2022-12-17 RX ADMIN — GABAPENTIN 300 MG: 300 CAPSULE ORAL at 03:12

## 2022-12-17 RX ADMIN — TAZOBACTAM SODIUM AND PIPERACILLIN SODIUM 4.5 G: 500; 4 INJECTION, SOLUTION INTRAVENOUS at 12:12

## 2022-12-17 RX ADMIN — INSULIN DETEMIR 15 UNITS: 100 INJECTION, SOLUTION SUBCUTANEOUS at 01:12

## 2022-12-17 RX ADMIN — GABAPENTIN 300 MG: 300 CAPSULE ORAL at 09:12

## 2022-12-17 RX ADMIN — TAZOBACTAM SODIUM AND PIPERACILLIN SODIUM 4.5 G: 500; 4 INJECTION, SOLUTION INTRAVENOUS at 02:12

## 2022-12-17 RX ADMIN — VANCOMYCIN HYDROCHLORIDE 1250 MG: 1 INJECTION, POWDER, LYOPHILIZED, FOR SOLUTION INTRAVENOUS at 02:12

## 2022-12-17 RX ADMIN — INSULIN ASPART 12 UNITS: 100 INJECTION, SOLUTION INTRAVENOUS; SUBCUTANEOUS at 09:12

## 2022-12-17 RX ADMIN — GABAPENTIN 300 MG: 300 CAPSULE ORAL at 08:12

## 2022-12-17 RX ADMIN — DIVALPROEX SODIUM 500 MG: 500 TABLET, FILM COATED, EXTENDED RELEASE ORAL at 08:12

## 2022-12-17 RX ADMIN — VANCOMYCIN HYDROCHLORIDE 1250 MG: 1 INJECTION, POWDER, LYOPHILIZED, FOR SOLUTION INTRAVENOUS at 04:12

## 2022-12-17 NOTE — PROGRESS NOTES
Pharmacokinetic Initial Assessment: IV Vancomycin    Assessment/Plan:    Initiate intravenous vancomycin with loading dose of 1250 mg once followed by a maintenance dose of vancomycin 1250mg IV every 12 hours  Desired empiric serum trough concentration is 15 to 20 mcg/mL  Draw vancomycin trough level 60 min prior to third dose on 12/18 at approximately 0200  Pharmacy will continue to follow and monitor vancomycin.      Please contact pharmacy at extension [phone] with any questions regarding this assessment.     Thank you for the consult,   Brayden Acosta       Patient brief summary:  Con Arnold is a 62 y.o. male initiated on antimicrobial therapy with IV Vancomycin for treatment of suspected bone/joint infection    Drug Allergies:   Review of patient's allergies indicates:   Allergen Reactions    Sulfa (sulfonamide antibiotics)        Actual Body Weight:   95.1 kg    Renal Function:   Estimated Creatinine Clearance: 73.8 mL/min (A) (based on SCr of 1.21 mg/dL (H)).,     Dialysis Method (if applicable):  N/A    CBC (last 72 hours):  Recent Labs   Lab Result Units 12/16/22  2214   WBC x10(3)/mcL 11.7*   Hgb gm/dL 16.0   Hemoglobin A1c % 11.3*   Hct % 44.6   Platelet x10(3)/mcL 106*   Mono % % 10.1   Eos % % 0.3   Basophil % % 0.5       Metabolic Panel (last 72 hours):  Recent Labs   Lab Result Units 12/16/22  2214   Sodium Level mmol/L 134*   Potassium Level mmol/L 4.1   Chloride mmol/L 99   Carbon Dioxide mmol/L 23   Glucose Level mg/dL 370*   Blood Urea Nitrogen mg/dL 20.3   Creatinine mg/dL 1.21*   Albumin Level g/dL 3.6   Bilirubin Total mg/dL 1.4   Alkaline Phosphatase unit/L 85   Aspartate Aminotransferase unit/L 14   Alanine Aminotransferase unit/L 9       Drug levels (last 3 results):  No results for input(s): VANCOMYCINRA, VANCORANDOM, VANCOMYCINPE, VANCOPEAK, VANCOMYCINTR, VANCOTROUGH in the last 72 hours.    Microbiologic Results:  Microbiology Results (last 7 days)       ** No results found for the last  168 hours. **

## 2022-12-17 NOTE — ED PROVIDER NOTES
Encounter Date: 12/16/2022       History     Chief Complaint   Patient presents with    Fever    Cough    Foot Injury     Says  that  he  has  osteomylitis  to  both his  feet.  Says  he  has  sores  on his  feet.  Is  a  diabetic  but  has  not  had  insulin  in  over  year     Presents with cough and chills for few days. States Hx of DM, HTN and remote osteomyelitis of his foots. States he is homeless and have been sleeping in his car and the cold make him cough. States unable tp get his medications due to his economic situation    The history is provided by the patient.   Review of patient's allergies indicates:   Allergen Reactions    Sulfa (sulfonamide antibiotics)      Past Medical History:   Diagnosis Date    Blindness due to type 2 diabetes mellitus     Cataract     Diabetes mellitus, type 2     Glaucoma     Hyperlipidemia     Neuropathy     Osteomyelitis      Past Surgical History:   Procedure Laterality Date    APPENDECTOMY      CARPAL TUNNEL RELEASE Bilateral     FEMORAL ARTERY STENT Left     FOOT SURGERY       Family History   Problem Relation Age of Onset    COPD Mother     Macular degeneration Mother     Hypertension Father     Cancer Father      Social History     Tobacco Use    Smoking status: Former     Types: Cigarettes    Smokeless tobacco: Never   Substance Use Topics    Alcohol use: Never    Drug use: Never     Review of Systems   Constitutional:  Positive for chills. Negative for fever.   HENT:  Positive for congestion and rhinorrhea. Negative for sore throat.    Respiratory:  Positive for cough. Negative for shortness of breath.    Cardiovascular:  Negative for chest pain.   Gastrointestinal:  Negative for nausea.   Endocrine: Positive for polyuria.   Genitourinary:  Positive for frequency. Negative for dysuria.   Musculoskeletal:  Positive for joint swelling (Ankles and foot). Negative for back pain.   Skin:  Positive for color change and wound (In his foot). Negative for rash.    Neurological:  Negative for weakness.   Hematological:  Does not bruise/bleed easily.   All other systems reviewed and are negative.    Physical Exam     Initial Vitals [12/16/22 2154]   BP Pulse Resp Temp SpO2   112/77 85 20 99.5 °F (37.5 °C) 96 %      MAP       --         Physical Exam    Nursing note and vitals reviewed.  Constitutional: He appears well-developed and well-nourished.   HENT:   Head: Normocephalic and atraumatic.   Mouth/Throat: Oropharynx is clear and moist.   Eyes: Conjunctivae and EOM are normal. Pupils are equal, round, and reactive to light.   Neck: Neck supple.   Normal range of motion.  Cardiovascular:  Regular rhythm, normal heart sounds and intact distal pulses.           Pulmonary/Chest: Breath sounds normal. No respiratory distress.   Abdominal: Abdomen is soft. Bowel sounds are normal. He exhibits no distension. There is no abdominal tenderness. There is no rebound and no guarding.   Musculoskeletal:         General: No edema. Normal range of motion.      Cervical back: Normal range of motion and neck supple.      Comments: Mild swelling of foot     Neurological: He is alert and oriented to person, place, and time. He has normal strength. GCS score is 15. GCS eye subscore is 4. GCS verbal subscore is 5. GCS motor subscore is 6.   Skin: Skin is warm and dry. No rash noted.   Bilateral plantar area calluses with open wounds   Psychiatric: His behavior is normal.       ED Course   Procedures  Labs Reviewed   COMPREHENSIVE METABOLIC PANEL - Abnormal; Notable for the following components:       Result Value    Sodium Level 134 (*)     Glucose Level 370 (*)     Creatinine 1.21 (*)     Globulin 3.8 (*)     Albumin/Globulin Ratio 0.9 (*)     All other components within normal limits   SEDIMENTATION RATE, AUTOMATED - Abnormal; Notable for the following components:    Sed Rate 42 (*)     All other components within normal limits   C-REACTIVE PROTEIN - Abnormal; Notable for the following  components:    C-Reactive Protein 81.10 (*)     All other components within normal limits   HEMOGLOBIN A1C - Abnormal; Notable for the following components:    Hemoglobin A1c 11.3 (*)     All other components within normal limits   CBC WITH DIFFERENTIAL - Abnormal; Notable for the following components:    WBC 11.7 (*)     MCV 94.1 (*)     Platelet 106 (*)     All other components within normal limits   POCT GLUCOSE - Abnormal; Notable for the following components:    POCT Glucose 339 (*)     All other components within normal limits   COVID/FLU A&B PCR - Normal    Narrative:     The Xpert Xpress SARS-CoV-2/FLU/RSV plus is a rapid, multiplexed real-time PCR test intended for the simultaneous qualitative detection and differentiation of SARS-CoV-2, Influenza A, Influenza B, and respiratory syncytial virus (RSV) viral RNA in either nasopharyngeal swab or nasal swab specimens.         CBC W/ AUTO DIFFERENTIAL    Narrative:     The following orders were created for panel order CBC auto differential.  Procedure                               Abnormality         Status                     ---------                               -----------         ------                     CBC with Differential[[id number]]        Abnormal            Final result                 Please view results for these tests on the individual orders.   EXTRA TUBES    Narrative:     The following orders were created for panel order EXTRA TUBES.  Procedure                               Abnormality         Status                     ---------                               -----------         ------                     Light Blue Top Hold[[id number]]                              In process                 Light Green Top Hold[[id number]]                             In process                   Please view results for these tests on the individual orders.   LIGHT BLUE TOP HOLD   LIGHT GREEN TOP HOLD          Imaging Results              X-Ray Foot Complete  Right (In process)                      X-Ray Foot Complete Left (In process)                      X-Ray Chest PA And Lateral (In process)                   X-Rays:   Other Radiology Reports:   Discussed with Radiology : Rt foot: Post surgical changes, Foreign body on plantar aspect noticed    Lt foot: Chronic OA changes, possible subluxation of MTP joint on 2nd and 3rd digits   Independently Interpreted Readings:   Chest X-Ray: Normal heart size.  No infiltrates.  No acute abnormalities.   Medications   aspirin EC tablet 81 mg (has no administration in time range)   divalproex 24 hr tablet 500 mg (has no administration in time range)   insulin detemir U-100 injection 15 Units (15 Units Subcutaneous Given 12/17/22 0128)   insulin aspart U-100 injection 5 Units (has no administration in time range)   atorvastatin tablet 40 mg (has no administration in time range)   fluticasone furoate-vilanteroL 100-25 mcg/dose diskus inhaler 1 puff (has no administration in time range)   gabapentin capsule 300 mg (has no administration in time range)   sodium chloride 0.9% flush 10 mL (has no administration in time range)   naloxone 0.4 mg/mL injection 0.02 mg (has no administration in time range)   glucose chewable tablet 16 g (has no administration in time range)   glucose chewable tablet 24 g (has no administration in time range)   glucagon (human recombinant) injection 1 mg (has no administration in time range)   dextrose 10% bolus 125 mL 125 mL (has no administration in time range)   dextrose 10% bolus 250 mL 250 mL (has no administration in time range)   enoxaparin injection 40 mg (has no administration in time range)   HYDROcodone-acetaminophen 5-325 mg per tablet 1 tablet (has no administration in time range)   insulin aspart U-100 injection 3-21 Units (has no administration in time range)   vancomycin - pharmacy to dose (has no administration in time range)   piperacillin-tazobactam (ZOSYN) 4.5 gram/100 mL IVPB (4.5 g  Intravenous New Bag 12/17/22 0230)   sodium chloride 0.9% bolus 1,000 mL 1,000 mL (1,000 mLs Intravenous New Bag 12/17/22 0204)   0.9%  NaCl infusion (has no administration in time range)   vancomycin (VANCOCIN) 1,250 mg in sodium chloride 0.9% 250 mL IVPB (0 mg Intravenous Stopped 12/17/22 0335)   albuterol-ipratropium 2.5 mg-0.5 mg/3 mL nebulizer solution 3 mL (has no administration in time range)   albuterol-ipratropium 2.5 mg-0.5 mg/3 mL nebulizer solution 3 mL (has no administration in time range)   insulin aspart U-100 injection 3 Units (3 Units Subcutaneous Given 12/16/22 2308)                 ED Course as of 12/17/22 0532   Sat Dec 17, 2022   0035 Discussed with internal medicine.  Will come to evaluate the patient. [MW]      ED Course User Index  [MW] Marcelo Joy MD              Clinical Impression:   Patient will return to      ED Disposition Condition    Admit                 Marcelo Joy MD  12/17/22 0324

## 2022-12-17 NOTE — H&P
[facility]   History & Physical Note     Resident Team: [facility]  Attending Physician: Evie Burger MD  Resident: Matt Angulo MD  Intern: Con Anaya MD       Date of Admit: 12/16/2022    Chief Complaint:     Fever, Cough, and Foot Injury (Says  that  he  has  osteomylitis  to  both his  feet.  Says  he  has  sores  on his  feet.  Is  a  diabetic  but  has  not  had  insulin  in  over  year)      Subjective:      History of Present Illness:  Con Arnold is a 62 y.o. male who with a history of chronic osteomyelitis of the foot bilaterally on suppresive doxycycline, htn, uncontrolled DMII, who presented to [facility] ED on 12/16/2022  with complaint of bilateral foot pain, ulcers.    Patient has hx of osteomyelitis of the foot with numerous admissions for antibiotic treatment. Patients was followed in [place] and was on suppressive doxycycline to which he has not taken in 2 months. He states he has run out of supplies for cleaning his foot ulcers, and states he is homeless. Patient was admitted in [place] where he received 1 month of antibiotics in July, and then another month in august in [place] where he had a toe and metatarsals removed from his right foot. He states that it helped however he has recently moved back. His feet are worse over the past month, he has pain though still is able to ambulate. He is with his son and they are trying to get housing although they have been running into difficulties. Patient sought consult due to unbearable pain, will admit for abx, wound assessment and treatment, and blood sugar control.    Of note he has not had insulin in over a year.      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     Past Surgical History:   has a past surgical history that includes Appendectomy; Foot surgery; Femoral artery stent  (Left); and Carpal tunnel release (Bilateral).     Family History:  family history includes COPD in his mother; Cancer in his father; Hypertension in his father; Macular degeneration in his mother.     Social History:   reports that he has quit smoking. His smoking use included cigarettes. He has never used smokeless tobacco. He reports that he does not drink alcohol and does not use drugs.     Allergies:  is allergic to sulfa (sulfonamide antibiotics).     Home Medications:  Prior to Admission medications    Medication Sig Start Date End Date Taking? Authorizing Provider   albuterol (PROVENTIL/VENTOLIN HFA) 90 mcg/actuation inhaler Ventolin HFA 90 mcg/actuation aerosol inhaler    Historical Provider   aspirin (ECOTRIN) 81 MG EC tablet Take 1 tablet (81 mg total) by mouth once daily. 11/11/22 11/11/23  Damion Armstrong MD   blood sugar diagnostic (BLOOD GLUCOSE TEST) Strp Accu-Chek Rianna Plus test strips    Historical Provider   DEPAKOTE  mg Tb24 Take 1 tablet (500 mg total) by mouth once daily. 11/11/22 11/11/23  Damion Armstrong MD   dicyclomine (BENTYL) 20 mg tablet dicyclomine 20 mg tablet   Take 1 tablet 4 times a day by oral route as needed.    Historical Provider   fluticasone furoate-vilanteroL (BREO ELLIPTA) 100-25 mcg/dose diskus inhaler Inhale 1 puff into the lungs once daily. Controller 11/11/22   Damion Armstrong MD   fluticasone-salmeterol diskus inhaler 250-50 mcg fluticasone 250 mcg-salmeterol 50 mcg/dose blistr powdr for inhalation    Historical Provider   gabapentin (NEURONTIN) 300 MG capsule Take 300 mg by mouth 3 (three) times daily. 3/11/22   Historical Provider   insulin (LANTUS SOLOSTAR U-100 INSULIN) glargine 100 units/mL SubQ pen Inject 15 Units into the skin every evening. 11/11/22 11/11/23  Damion Armstrong MD   insulin lispro 100 unit/mL pen Inject 5 Units into the skin 3 (three) times daily before meals. 11/11/22 11/11/23  Damion WARNER  "Augustine Armstrong MD   latanoprost 0.005 % ophthalmic solution latanoprost 0.005 % eye drops    Historical Provider   lisinopriL 10 MG tablet Take 1 tablet (10 mg total) by mouth once daily. 11/11/22 11/11/23  Damion Armstrong MD   pen needle, diabetic (BD ULTRA-FINE SHORT PEN NEEDLE) 31 gauge x 5/16" Ndle BD Ultra-Fine Short Pen Needle 31 gauge x 5/16"    Historical Provider   pravastatin (PRAVACHOL) 80 MG tablet Take 1 tablet (80 mg total) by mouth every evening. 11/11/22   Damion Armstrong MD         Review of Systems:  Review of Systems   Constitutional:  Positive for malaise/fatigue. Negative for chills, fever and weight loss.   Respiratory:  Positive for cough and wheezing. Negative for shortness of breath.    Cardiovascular:  Negative for chest pain, palpitations, orthopnea and claudication.   Musculoskeletal:  Positive for joint pain and myalgias.   Skin:  Positive for itching.          Objective:     Vital Signs (Most Recent):  Temp: 99.5 °F (37.5 °C) (12/16/22 2154)  Pulse: 66 (12/17/22 0131)  Resp: (!) 22 (12/16/22 2251)  BP: 117/68 (12/17/22 0131)  SpO2: 96 % (12/17/22 0131)   Vital Signs (24h Range):  Temp:  [99.5 °F (37.5 °C)] 99.5 °F (37.5 °C)  Pulse:  [66-85] 66  Resp:  [20-22] 22  SpO2:  [96 %] 96 %  BP: (112-124)/(65-77) 117/68       Physical Examination:  Physical Exam  Constitutional:       General: He is not in acute distress.     Appearance: Normal appearance. He is not ill-appearing.   HENT:      Head: Normocephalic and atraumatic.      Nose: Nose normal.   Eyes:      Extraocular Movements: Extraocular movements intact.      Conjunctiva/sclera: Conjunctivae normal.      Pupils: Pupils are equal, round, and reactive to light.   Cardiovascular:      Rate and Rhythm: Normal rate and regular rhythm.   Pulmonary:      Effort: Pulmonary effort is normal. No respiratory distress.      Breath sounds: Wheezing present.      Comments: Breath sounds distant  Abdominal:      " General: Abdomen is flat.      Palpations: Abdomen is soft.   Musculoskeletal:         General: Deformity (hammer toes bilaterally) and signs of injury (Bilateral ulcer plantar surface of foot, right larger than left, no discharges) present. Normal range of motion.      Cervical back: Normal range of motion and neck supple.   Skin:     General: Skin is warm.      Capillary Refill: Capillary refill takes less than 2 seconds.   Neurological:      Mental Status: He is alert.      Sensory: Sensory deficit present.   Psychiatric:         Mood and Affect: Mood normal.         Behavior: Behavior normal.         Laboratory:  Most Recent Data:  CBC:   Recent Labs   Lab 12/16/22  2214   WBC 11.7*   HGB 16.0   HCT 44.6   *     CMP:   Recent Labs   Lab 12/16/22 2214   CALCIUM 9.7   ALBUMIN 3.6   *   K 4.1   CO2 23   BUN 20.3   CREATININE 1.21*   ALKPHOS 85   ALT 9   AST 14   BILITOT 1.4         Microbiology Data:  N/A    Radiology:  Imaging Results              X-Ray Foot Complete Right (In process)                      X-Ray Foot Complete Left (In process)                      X-Ray Chest PA And Lateral (In process)                        Lines/Drains/Airways       Peripheral Intravenous Line  Duration                  Peripheral IV - Single Lumen 12/17/22 0140 20 G Anterior;Right Forearm <1 day                     Assessment & Plan:     Diabetic Ulcers, Bilateral   Hx of Osteomyelitis of the Feet, Bilateral  Uncontrolled DMII  - HbA1c 11.3% at this time  - Has not taken insulin in over 1 year  - Worsening ulcers, right worse than left of plantar surfaces of feet  - Will start home insulin doses per chart, with moderate sliding scale  - Vancomycin and Zosyn to cover polymicrobial etiologies  - Will consult Surgery and Wound Care in AM  - PRN Pain medications in place  - Will get CT Foot with contrast  - Gabapentin 300mg on board for diabetic neuropathy    Mild IMAN likely from glucosuric losses  - Will hydrate  with NS    Chronic airway disease, likely COPD no PFTs at this time  Hx of Tobacco Use   - Wheezing, though no in respiratory distress will start home inhalers  - DuoNebs scheduled q6h      CODE STATUS: Full Code  Access: Peripheral  Antibiotics: Vanc and Zosyn  Diet: Diabetic  DVT Prophylaxis: Lovenox  GI Prophylaxis: none needed  Fluids: normal saline 125 ml/hr to start after 1L bolus of NS.     Disposition: day 0 of admission for Bilateral diabetic foot, hx of osteomyelitis, consult surgery and wound care, patient is stable will discharge pending blood sugar control, and wound eval and treatment    Con Anaya MD  [facility] PGY-1

## 2022-12-17 NOTE — CONSULTS
General Surgery  Consult Note    Inpatient consult to General Surgery  Consult performed by: William Silva MD  Consult ordered by: Con Anaya MD      Subjective:     Reason for Consult: Diabetic foot wounds    History of Present Illness:   Con Arnold is a 62 y.o.m with PMH T2DM, HLD, cataracts, chronic osteomylelitis of the bilateral feet, s/p r/s 2nd digit ray amp who presented to he ED for fever and cough. Internal medicine team consulting surgery for bilateral diabetic foot wounds. Per pt history, he has had these wounds for at least a year, which have been worked up and diagnosed as osteomyelitis, but he is not amenable to amputation. He has been on long-term antibiotics, and is supposed to follow up with wound care clinic, which he notes he last saw in March. He has uncontrolled diabetes with an A1c of 11.3, and he has not been taking insulin as suggested, citing his homelessness as a factor for being unable  to obtain his prescriptions for his diabetic test strips and insulin. Workup thus far has included labs including a WBC 11.3, Xrs and CT of bilateral feet revealing no deep drainable fluid collection or definitive osseus erosion.       No current facility-administered medications on file prior to encounter.     Current Outpatient Medications on File Prior to Encounter   Medication Sig    albuterol (PROVENTIL/VENTOLIN HFA) 90 mcg/actuation inhaler Ventolin HFA 90 mcg/actuation aerosol inhaler    aspirin (ECOTRIN) 81 MG EC tablet Take 1 tablet (81 mg total) by mouth once daily.    blood sugar diagnostic (BLOOD GLUCOSE TEST) Strp Accu-Chek Rianna Plus test strips    DEPAKOTE  mg Tb24 Take 1 tablet (500 mg total) by mouth once daily.    dicyclomine (BENTYL) 20 mg tablet dicyclomine 20 mg tablet   Take 1 tablet 4 times a day by oral route as needed.    fluticasone furoate-vilanteroL (BREO ELLIPTA) 100-25 mcg/dose diskus inhaler Inhale 1 puff into the lungs once daily. Controller     "fluticasone-salmeterol diskus inhaler 250-50 mcg fluticasone 250 mcg-salmeterol 50 mcg/dose blistr powdr for inhalation    gabapentin (NEURONTIN) 300 MG capsule Take 300 mg by mouth 3 (three) times daily.    insulin (LANTUS SOLOSTAR U-100 INSULIN) glargine 100 units/mL SubQ pen Inject 15 Units into the skin every evening.    insulin lispro 100 unit/mL pen Inject 5 Units into the skin 3 (three) times daily before meals.    latanoprost 0.005 % ophthalmic solution latanoprost 0.005 % eye drops    lisinopriL 10 MG tablet Take 1 tablet (10 mg total) by mouth once daily.    pen needle, diabetic (BD ULTRA-FINE SHORT PEN NEEDLE) 31 gauge x 5/16" Ndle BD Ultra-Fine Short Pen Needle 31 gauge x 5/16"    pravastatin (PRAVACHOL) 80 MG tablet Take 1 tablet (80 mg total) by mouth every evening.       Review of patient's allergies indicates:   Allergen Reactions    Sulfa (sulfonamide antibiotics)        Past Medical History:   Diagnosis Date    Blindness due to type 2 diabetes mellitus     Cataract     Diabetes mellitus, type 2     Glaucoma     Hyperlipidemia     Neuropathy     Osteomyelitis      Past Surgical History:   Procedure Laterality Date    APPENDECTOMY      CARPAL TUNNEL RELEASE Bilateral     FEMORAL ARTERY STENT Left     FOOT SURGERY       Family History       Problem Relation (Age of Onset)    COPD Mother    Cancer Father    Hypertension Father    Macular degeneration Mother          Tobacco Use    Smoking status: Former     Types: Cigarettes    Smokeless tobacco: Never   Substance and Sexual Activity    Alcohol use: Never    Drug use: Never    Sexual activity: Yes       Objective:     Vital Signs (Most Recent):  Temp: 99.5 °F (37.5 °C) (12/16/22 2154)  Pulse: 84 (12/17/22 0924)  Resp: 18 (12/17/22 0911)  BP: 118/76 (12/17/22 0924)  SpO2: 96 % (12/17/22 0911) Vital Signs (24h Range):  Temp:  [99.5 °F (37.5 °C)] 99.5 °F (37.5 °C)  Pulse:  [66-92] 84  Resp:  [18-22] 18  SpO2:  [95 %-96 %] 96 %  BP: (112-136)/(65-78) " 118/76     Weight: 95.1 kg (209 lb 8.8 oz)  Body mass index is 28.42 kg/m².    No intake or output data in the 24 hours ending 12/17/22 1121    Physical Exam  Constitutional:       General: He is not in acute distress.  HENT:      Head: Normocephalic.   Eyes:      Extraocular Movements: Extraocular movements intact.      Pupils: Pupils are equal, round, and reactive to light.   Cardiovascular:      Rate and Rhythm: Normal rate.      Pulses:           Dorsalis pedis pulses are 2+ on the right side and 2+ on the left side.   Pulmonary:      Effort: Pulmonary effort is normal. No respiratory distress.   Abdominal:      General: Abdomen is flat.      Palpations: Abdomen is soft.   Feet:      Comments: S/p right 2nd digit ray amp  Left foot with wound overlying distal middle metarsals with prominent overlying callous and no significant purulence  Right foot with wound overlying distal middle metarsals with prominent overlying callous and no significant purulence, smaller in size than left  (See clinical media)  Skin:     General: Skin is warm and dry.   Neurological:      Mental Status: He is alert.       Laboratory:  Recent Labs     12/16/22  2214   WBC 11.7*   HGB 16.0   HCT 44.6   *     Recent Labs     12/16/22  2214   *   K 4.1   CO2 23   BUN 20.3   CREATININE 1.21*   CALCIUM 9.7   ALBUMIN 3.6   BILITOT 1.4   AST 14   ALKPHOS 85   ALT 9       Diagnostic Results:  Bilateral XR foot  Bilateral CT Foot  Without definite acute fracture, drainable fluid, or acute erosive bone changes suggestive of osteomyelitis        Assessment/Plan:   Con Arnold is a 62 y.o.m with PMH T2DM, HLD, cataracts, chronic osteomylelitis of the bilateral feet, s/p r/s 2nd digit ray amp with bilateral chronic diabetic foot wounds      - Bedside debridement of bilateral foot wounds performed; please seen separate procedure note for details  - No evidence of bony infection via callouses on bedside debridement  - No obvious  osteomyelitis on CT imaging  - Santyl daily ordered for bilateral wounds  - Consult placed for wound care for bilateral foot wounds  - Pt states he is not amenable to amputation; please call back if patient becomes amenable to surgical intervention or indication arises for further debridement. No purulence in existing ulcer bases.    William Silva MD  [facility] Department of Surgery

## 2022-12-17 NOTE — PLAN OF CARE
Problem: Adult Inpatient Plan of Care  Goal: Plan of Care Review  Outcome: Ongoing, Progressing  Goal: Patient-Specific Goal (Individualized)  Outcome: Ongoing, Progressing  Goal: Absence of Hospital-Acquired Illness or Injury  Outcome: Ongoing, Progressing  Goal: Optimal Comfort and Wellbeing  Outcome: Ongoing, Progressing  Goal: Readiness for Transition of Care  Outcome: Ongoing, Progressing     Problem: Impaired Wound Healing  Goal: Optimal Wound Healing  Outcome: Ongoing, Progressing     Problem: Infection  Goal: Absence of Infection Signs and Symptoms  Outcome: Ongoing, Progressing     Problem: Electrolyte Imbalance  Goal: Electrolyte Balance  Outcome: Ongoing, Progressing     Problem: Diabetes Comorbidity  Goal: Blood Glucose Level Within Targeted Range  Outcome: Ongoing, Progressing

## 2022-12-17 NOTE — PROCEDURES
"Excisional debridement    Date/Time: 2022 9:57 PM  Location procedure was performed: [facility] GENERAL SURGERY  Performed by: Gm Smallwood MD  Authorized by: Gm Smallwood MD   Pre-operative diagnosis: bilateral plantar foot ulcers  Post-operative diagnosis: bilateral plantar foot ulcers  Consent Done: Yes  Consent: Verbal consent obtained. Written consent obtained.  Risks and benefits: risks, benefits and alternatives were discussed  Consent given by: patient  Patient understanding: patient states understanding of the procedure being performed  Patient consent: the patient's understanding of the procedure matches consent given  Procedure consent: procedure consent matches procedure scheduled  Relevant documents: relevant documents present and verified  Test results: test results available and properly labeled  Site marked: the operative site was marked  Imaging studies: imaging studies available  Patient identity confirmed: , MRN and verbally with patient  Time out: Immediately prior to procedure a "time out" was called to verify the correct patient, procedure, equipment, support staff and site/side marked as required.  Indications for incision and drainage: bilateral foot ulcers.  Body area: lower extremity  Anesthesia: local infiltration    Anesthesia:  Local Anesthetic: lidocaine 1% with epinephrine  Anesthetic total: 3 mL    Patient sedated: no  Description of findings: bilateral plantar foot calloused ulcers debrided back to healthy tissue centrally. Some callous remains around periphery of wound   Scalpel size: 11  Complexity: simple  Drainage: serous  Drainage amount: scant  Wound treatment: wound left open  Complications: No  Specimens: No  Implants: No  Patient tolerance: Patient tolerated the procedure well with no immediate complications  Comments: Left foot debridement of callous and debridement of central wound back to healthy bleeding tissue, minimal serous drainage expressed, no " purulence, does not probe to bone.  Right foot callous cleaned up callous removed sharply          Recommendations:  No evidence of bony infection via callouses on my debridement.   Santyl daily ordered for bilateral wounds  Wound care consult placed for further recommendations  No obvious OM on CT  Please call back if patient becomes amenable to surgical intervention or indication arises for further debridement. No purulence in existing ulcer bases.    Gm Smallwood MD  [employer] General Surgery PGY3

## 2022-12-18 LAB
ALBUMIN SERPL-MCNC: 2.7 G/DL (ref 3.4–4.8)
ALBUMIN/GLOB SERPL: 0.8 RATIO (ref 1.1–2)
ALP SERPL-CCNC: 72 UNIT/L (ref 40–150)
ALT SERPL-CCNC: 13 UNIT/L (ref 0–55)
AST SERPL-CCNC: 16 UNIT/L (ref 5–34)
BASOPHILS # BLD AUTO: 0.05 X10(3)/MCL (ref 0–0.2)
BASOPHILS NFR BLD AUTO: 0.7 %
BILIRUBIN DIRECT+TOT PNL SERPL-MCNC: 0.9 MG/DL
BUN SERPL-MCNC: 14.7 MG/DL (ref 8.4–25.7)
CALCIUM SERPL-MCNC: 8.9 MG/DL (ref 8.8–10)
CHLORIDE SERPL-SCNC: 107 MMOL/L (ref 98–107)
CO2 SERPL-SCNC: 25 MMOL/L (ref 23–31)
CREAT SERPL-MCNC: 0.9 MG/DL (ref 0.73–1.18)
EOSINOPHIL # BLD AUTO: 0.1 X10(3)/MCL (ref 0–0.9)
EOSINOPHIL NFR BLD AUTO: 1.3 %
ERYTHROCYTE [DISTWIDTH] IN BLOOD BY AUTOMATED COUNT: 13.2 % (ref 11.6–14.4)
GFR SERPLBLD CREATININE-BSD FMLA CKD-EPI: >60 MLS/MIN/1.73/M2
GLOBULIN SER-MCNC: 3.6 GM/DL (ref 2.4–3.5)
GLUCOSE SERPL-MCNC: 184 MG/DL (ref 82–115)
HCT VFR BLD AUTO: 43.9 % (ref 42–52)
HGB BLD-MCNC: 15 GM/DL (ref 14–18)
IMM GRANULOCYTES # BLD AUTO: 0.02 X10(3)/MCL (ref 0–0.04)
IMM GRANULOCYTES NFR BLD AUTO: 0.3 %
LYMPHOCYTES # BLD AUTO: 1.17 X10(3)/MCL (ref 0.6–4.6)
LYMPHOCYTES NFR BLD AUTO: 15.5 %
MCH RBC QN AUTO: 33.1 PG
MCHC RBC AUTO-ENTMCNC: 34.2 MG/DL (ref 33–36)
MCV RBC AUTO: 96.9 FL (ref 80–94)
MONOCYTES # BLD AUTO: 0.76 X10(3)/MCL (ref 0.1–1.3)
MONOCYTES NFR BLD AUTO: 10.1 %
NEUTROPHILS # BLD AUTO: 5.44 X10(3)/MCL (ref 2.1–9.2)
NEUTROPHILS NFR BLD AUTO: 72.1 %
NRBC BLD AUTO-RTO: 0 % (ref 0–1)
PLATELET # BLD AUTO: 80 X10(3)/MCL (ref 140–371)
PMV BLD AUTO: 12.2 FL (ref 9.4–12.4)
POCT GLUCOSE: 164 MG/DL (ref 70–110)
POCT GLUCOSE: 184 MG/DL (ref 70–110)
POCT GLUCOSE: 211 MG/DL (ref 70–110)
POCT GLUCOSE: 296 MG/DL (ref 70–110)
POTASSIUM SERPL-SCNC: 3.7 MMOL/L (ref 3.5–5.1)
PROT SERPL-MCNC: 6.3 GM/DL (ref 5.8–7.6)
RBC # BLD AUTO: 4.53 X10(6)/MCL (ref 4.7–6.1)
SODIUM SERPL-SCNC: 138 MMOL/L (ref 136–145)
VANCOMYCIN TROUGH SERPL-MCNC: 15.4 UG/ML (ref 15–20)
WBC # SPEC AUTO: 7.5 X10(3)/MCL (ref 4.5–11.5)

## 2022-12-18 PROCEDURE — 63600175 PHARM REV CODE 636 W HCPCS

## 2022-12-18 PROCEDURE — 63600175 PHARM REV CODE 636 W HCPCS: Performed by: STUDENT IN AN ORGANIZED HEALTH CARE EDUCATION/TRAINING PROGRAM

## 2022-12-18 PROCEDURE — 25000242 PHARM REV CODE 250 ALT 637 W/ HCPCS

## 2022-12-18 PROCEDURE — 94640 AIRWAY INHALATION TREATMENT: CPT

## 2022-12-18 PROCEDURE — 25000003 PHARM REV CODE 250: Performed by: STUDENT IN AN ORGANIZED HEALTH CARE EDUCATION/TRAINING PROGRAM

## 2022-12-18 PROCEDURE — 25000242 PHARM REV CODE 250 ALT 637 W/ HCPCS: Performed by: STUDENT IN AN ORGANIZED HEALTH CARE EDUCATION/TRAINING PROGRAM

## 2022-12-18 PROCEDURE — 25000003 PHARM REV CODE 250

## 2022-12-18 PROCEDURE — 85025 COMPLETE CBC W/AUTO DIFF WBC: CPT

## 2022-12-18 PROCEDURE — 99900035 HC TECH TIME PER 15 MIN (STAT)

## 2022-12-18 PROCEDURE — 63600175 PHARM REV CODE 636 W HCPCS: Performed by: INTERNAL MEDICINE

## 2022-12-18 PROCEDURE — 80053 COMPREHEN METABOLIC PANEL: CPT

## 2022-12-18 PROCEDURE — 36415 COLL VENOUS BLD VENIPUNCTURE: CPT

## 2022-12-18 PROCEDURE — 25000003 PHARM REV CODE 250: Performed by: INTERNAL MEDICINE

## 2022-12-18 PROCEDURE — 11000001 HC ACUTE MED/SURG PRIVATE ROOM

## 2022-12-18 PROCEDURE — 80202 ASSAY OF VANCOMYCIN: CPT | Performed by: INTERNAL MEDICINE

## 2022-12-18 RX ORDER — IBUPROFEN 200 MG
24 TABLET ORAL
Status: DISCONTINUED | OUTPATIENT
Start: 2022-12-18 | End: 2022-12-30 | Stop reason: HOSPADM

## 2022-12-18 RX ORDER — IBUPROFEN 200 MG
16 TABLET ORAL
Status: DISCONTINUED | OUTPATIENT
Start: 2022-12-18 | End: 2022-12-30 | Stop reason: HOSPADM

## 2022-12-18 RX ORDER — INSULIN ASPART 100 [IU]/ML
8 INJECTION, SOLUTION INTRAVENOUS; SUBCUTANEOUS
Status: DISCONTINUED | OUTPATIENT
Start: 2022-12-18 | End: 2022-12-19

## 2022-12-18 RX ORDER — INSULIN ASPART 100 [IU]/ML
1-10 INJECTION, SOLUTION INTRAVENOUS; SUBCUTANEOUS
Status: DISCONTINUED | OUTPATIENT
Start: 2022-12-18 | End: 2022-12-30 | Stop reason: HOSPADM

## 2022-12-18 RX ORDER — GLUCAGON 1 MG
1 KIT INJECTION
Status: DISCONTINUED | OUTPATIENT
Start: 2022-12-18 | End: 2022-12-30 | Stop reason: HOSPADM

## 2022-12-18 RX ORDER — DOXYLAMINE SUCCINATE 25 MG
TABLET ORAL 2 TIMES DAILY
Status: DISCONTINUED | OUTPATIENT
Start: 2022-12-18 | End: 2022-12-30 | Stop reason: HOSPADM

## 2022-12-18 RX ORDER — INSULIN ASPART 100 [IU]/ML
10 INJECTION, SOLUTION INTRAVENOUS; SUBCUTANEOUS
Status: DISCONTINUED | OUTPATIENT
Start: 2022-12-18 | End: 2022-12-18

## 2022-12-18 RX ADMIN — ASPIRIN 81 MG: 81 TABLET ORAL at 08:12

## 2022-12-18 RX ADMIN — MICONAZOLE NITRATE: 20 CREAM TOPICAL at 10:12

## 2022-12-18 RX ADMIN — INSULIN ASPART 3 UNITS: 100 INJECTION, SOLUTION INTRAVENOUS; SUBCUTANEOUS at 09:12

## 2022-12-18 RX ADMIN — HYDROCODONE BITARTRATE AND ACETAMINOPHEN 1 TABLET: 5; 325 TABLET ORAL at 10:12

## 2022-12-18 RX ADMIN — INSULIN ASPART 5 UNITS: 100 INJECTION, SOLUTION INTRAVENOUS; SUBCUTANEOUS at 11:12

## 2022-12-18 RX ADMIN — GABAPENTIN 300 MG: 300 CAPSULE ORAL at 04:12

## 2022-12-18 RX ADMIN — INSULIN ASPART 5 UNITS: 100 INJECTION, SOLUTION INTRAVENOUS; SUBCUTANEOUS at 08:12

## 2022-12-18 RX ADMIN — VANCOMYCIN HYDROCHLORIDE 1250 MG: 1 INJECTION, POWDER, LYOPHILIZED, FOR SOLUTION INTRAVENOUS at 04:12

## 2022-12-18 RX ADMIN — IPRATROPIUM BROMIDE AND ALBUTEROL SULFATE 3 ML: 2.5; .5 SOLUTION RESPIRATORY (INHALATION) at 07:12

## 2022-12-18 RX ADMIN — ATORVASTATIN CALCIUM 40 MG: 20 TABLET, FILM COATED ORAL at 08:12

## 2022-12-18 RX ADMIN — GABAPENTIN 300 MG: 300 CAPSULE ORAL at 09:12

## 2022-12-18 RX ADMIN — PIPERACILLIN AND TAZOBACTAM 4.5 G: 4; .5 INJECTION, POWDER, LYOPHILIZED, FOR SOLUTION INTRAVENOUS; PARENTERAL at 09:12

## 2022-12-18 RX ADMIN — INSULIN DETEMIR 15 UNITS: 100 INJECTION, SOLUTION SUBCUTANEOUS at 09:12

## 2022-12-18 RX ADMIN — FLUTICASONE FUROATE AND VILANTEROL TRIFENATATE 1 PUFF: 100; 25 POWDER RESPIRATORY (INHALATION) at 07:12

## 2022-12-18 RX ADMIN — COLLAGENASE SANTYL: 250 OINTMENT TOPICAL at 08:12

## 2022-12-18 RX ADMIN — VANCOMYCIN HYDROCHLORIDE 1250 MG: 1 INJECTION, POWDER, LYOPHILIZED, FOR SOLUTION INTRAVENOUS at 03:12

## 2022-12-18 RX ADMIN — INSULIN ASPART 4 UNITS: 100 INJECTION, SOLUTION INTRAVENOUS; SUBCUTANEOUS at 04:12

## 2022-12-18 RX ADMIN — HYDROCODONE BITARTRATE AND ACETAMINOPHEN 1 TABLET: 5; 325 TABLET ORAL at 04:12

## 2022-12-18 RX ADMIN — INSULIN ASPART 8 UNITS: 100 INJECTION, SOLUTION INTRAVENOUS; SUBCUTANEOUS at 04:12

## 2022-12-18 RX ADMIN — GABAPENTIN 300 MG: 300 CAPSULE ORAL at 08:12

## 2022-12-18 RX ADMIN — PIPERACILLIN AND TAZOBACTAM 4.5 G: 4; .5 INJECTION, POWDER, LYOPHILIZED, FOR SOLUTION INTRAVENOUS; PARENTERAL at 02:12

## 2022-12-18 RX ADMIN — TAZOBACTAM SODIUM AND PIPERACILLIN SODIUM 4.5 G: 500; 4 INJECTION, SOLUTION INTRAVENOUS at 03:12

## 2022-12-18 RX ADMIN — ENOXAPARIN SODIUM 40 MG: 40 INJECTION SUBCUTANEOUS at 04:12

## 2022-12-18 RX ADMIN — DIVALPROEX SODIUM 500 MG: 500 TABLET, FILM COATED, EXTENDED RELEASE ORAL at 08:12

## 2022-12-18 RX ADMIN — MICONAZOLE NITRATE: 20 CREAM TOPICAL at 01:12

## 2022-12-18 NOTE — PROGRESS NOTES
Pharmacokinetic Assessment Follow Up: IV Vancomycin    Vancomycin serum concentration assessment(s):    The trough level was drawn correctly and can be used to guide therapy at this time. The measurement is within the desired definitive target range of 15 to 20 mcg/mL.    Vancomycin Regimen Plan:    Continue regimen to Vancomycin 1250 mg IV every 12 hours with next serum trough concentration measured at 1400 prior to 4th dose on 12/19    Drug levels (last 3 results):  Recent Labs   Lab Result Units 12/18/22  0152   Vancomycin Trough ug/ml 15.4       Pharmacy will continue to follow and monitor vancomycin.    Please contact pharmacy at extension [phone] for questions regarding this assessment.    Thank you for the consult,   Brayden Acosta       Patient brief summary:  Con Arnold is a 62 y.o. male initiated on antimicrobial therapy with IV Vancomycin for treatment of bone/joint infection    The patient's current regimen is 1250mg q12hr    Drug Allergies:   Review of patient's allergies indicates:   Allergen Reactions    Sulfa (sulfonamide antibiotics)        Actual Body Weight:   94.8kg    Renal Function:   Estimated Creatinine Clearance: 99.1 mL/min (based on SCr of 0.9 mg/dL).,     Dialysis Method (if applicable):  N/A    CBC (last 72 hours):  Recent Labs   Lab Result Units 12/16/22 2214 12/18/22  0152   WBC x10(3)/mcL 11.7* 7.5   Hgb gm/dL 16.0 15.0   Hemoglobin A1c % 11.3*  --    Hct % 44.6 43.9   Platelet x10(3)/mcL 106* 80*   Mono % % 10.1 10.1   Eos % % 0.3 1.3   Basophil % % 0.5 0.7       Metabolic Panel (last 72 hours):  Recent Labs   Lab Result Units 12/16/22 2214 12/18/22  0152   Sodium Level mmol/L 134* 138   Potassium Level mmol/L 4.1 3.7   Chloride mmol/L 99 107   Carbon Dioxide mmol/L 23 25   Glucose Level mg/dL 370* 184*   Blood Urea Nitrogen mg/dL 20.3 14.7   Creatinine mg/dL 1.21* 0.90   Albumin Level g/dL 3.6 2.7*   Bilirubin Total mg/dL 1.4 0.9   Alkaline Phosphatase unit/L 85 72   Aspartate  Aminotransferase unit/L 14 16   Alanine Aminotransferase unit/L 9 13       Vancomycin Administrations:  vancomycin given in the last 96 hours                     vancomycin (VANCOCIN) 1,250 mg in sodium chloride 0.9% 250 mL IVPB (mg) 1,250 mg New Bag 12/17/22 1600     1,250 mg New Bag  0205                    Microbiologic Results:  Microbiology Results (last 7 days)       ** No results found for the last 168 hours. **

## 2022-12-18 NOTE — PROGRESS NOTES
[facility] Medicine Wards   Progress Note     Resident Team: [facility] Medicine List 1  Attending Physician: Evie Burger MD  Resident: Matt Angulo MD  Intern: Con Anaya MD     Hospital Length of Stay: 1 days    Subjective:      Brief HPI:  Con Arnold is a 62 y.o. male who with a history of chronic osteomyelitis of the foot bilaterally on suppresive doxycycline, htn, uncontrolled DMII, who presented to [facility] ED on 12/16/2022  with complaint of bilateral foot pain, ulcers.     Patient has hx of osteomyelitis of the foot with numerous admissions for antibiotic treatment. Patients was followed in ID and was on suppressive doxycycline to which he has not taken in 2 months. He states he has run out of supplies for cleaning his foot ulcers, and states he is homeless. Patient was admitted in [place] where he received 1 month of antibiotics in July, and then another month in august in [place] where he had a toe and metatarsals removed from his right foot. He states that it helped however he has recently moved back. His feet are worse over the past month, he has pain though still is able to ambulate. He is with his son and they are trying to get housing although they have been running into difficulties. Patient sought consult due to unbearable pain, will admit for abx, wound assessment and treatment, and blood sugar control.     Of note he has not had insulin in over a year.    Interval History: Patient seen in bedside complaining of foot pain. He states cough is better with duonebs. Surgery saw patient yesterday and CT with contrast of the foot showed no evidence of osteomyelitis. Patient not amenable to amputation at this time. Will continue abx, reevaluate on Monday with MRI to characterize full extent of wound.      Review of Systems:    Constitutional:  Positive for malaise/fatigue. Negative for chills, fever and weight loss.   Respiratory:  Positive for cough and wheezing. Negative for shortness of breath.     Cardiovascular:  Negative for chest pain, palpitations, orthopnea and claudication.   Musculoskeletal:  Positive for joint pain and myalgias.   Skin:  Positive for itching.         Objective:     Vital Signs (Most Recent):  Temp: 97.4 °F (36.3 °C) (12/18/22 0722)  Pulse: 62 (12/18/22 0722)  Resp: 18 (12/18/22 0722)  BP: 112/69 (12/18/22 0722)  SpO2: (!) 92 % (12/18/22 0722)   Vital Signs (24h Range):  Temp:  [97.4 °F (36.3 °C)-98.9 °F (37.2 °C)] 97.4 °F (36.3 °C)  Pulse:  [57-91] 62  Resp:  [17-19] 18  SpO2:  [92 %-97 %] 92 %  BP: (102-117)/(42-71) 112/69       Physical Examination:  Constitutional:       General: He is not in acute distress.     Appearance: Normal appearance. He is not ill-appearing.   HENT:      Head: Normocephalic and atraumatic.      Nose: Nose normal.   Eyes:      Extraocular Movements: Extraocular movements intact.      Conjunctiva/sclera: Conjunctivae normal.      Pupils: Pupils are equal, round, and reactive to light.   Cardiovascular:      Rate and Rhythm: Normal rate and regular rhythm.   Pulmonary:      Effort: Pulmonary effort is normal. No respiratory distress.      Breath sounds: Wheezing present.      Comments: Breath sounds distant  Abdominal:      General: Abdomen is flat.      Palpations: Abdomen is soft.   Musculoskeletal:         General: Deformity (hammer toes bilaterally) and signs of injury (Bilateral ulcer plantar surface of foot, left larger than right, no discharges) present. Normal range of motion.      Cervical back: Normal range of motion and neck supple.   Skin:     General: Skin is warm.      Capillary Refill: Capillary refill takes less than 2 seconds.   Neurological:      Mental Status: He is alert.      Sensory: Sensory deficit present.   Psychiatric:         Mood and Affect: Mood normal.         Behavior: Behavior normal.        Laboratory:  Most Recent Data:  CBC:   Recent Labs   Lab 12/16/22  2214 12/18/22  0152   WBC 11.7* 7.5   HGB 16.0 15.0   HCT 44.6 43.9    * 80*     CMP:   Recent Labs   Lab 12/18/22  0152   CALCIUM 8.9   ALBUMIN 2.7*      K 3.7   CO2 25   BUN 14.7   CREATININE 0.90   ALKPHOS 72   ALT 13   AST 16   BILITOT 0.9         Radiology:  Imaging Results              CT Foot W W/O Contrast Bilateral (Final result)  Result time 12/17/22 10:04:45      Final result by Ayse Martinez MD (12/17/22 10:04:45)                   Impression:      1. No definite acute fracture or acute erosive bone changes.  2. Chronic appearing changes at the left 2nd MTP joint.  3. Bilateral plantar surface soft tissue ulcerations and blisters.  Otherwise no deep drainable fluid collection.  4. 3 mm radiopaque foreign body in the plantar surface underlying the right 1st metatarsal head.      Electronically signed by: Ayse Martinez  Date:    12/17/2022  Time:    10:04               Narrative:    EXAMINATION:  CT FOOT W W/O CONTRAST BILATERAL    CLINICAL HISTORY:  Foot pain, chronic, osseous injury suspected;Diabetic Foot, r/o osteo;    TECHNIQUE:  CT images of the bilateral feet with and without contrast.  Axial, coronal, and sagittal reformatted images were obtained. Dose length product is 143 mGycm. Automatic exposure control, adjustment of mA/kV or iterative reconstruction technique was used to limit radiation dose.    COMPARISON:  X-rays dated 12/16/2022    FINDINGS:  Right foot:    There is no acute fracture identified.  There are no definite acute erosive bone changes.    There is a soft tissue ulceration along the plantar surface underlying the 3rd metatarsal head, with blister at the skin surface measuring 5 x 23 mm in size (series 14, image 32).  There is otherwise no drainable fluid collection.  There is a 3 mm radiopaque foreign body in the plantar surface underlying the 1st metatarsal head.    Left foot:    There is no definite acute fracture identified.  There is dorsal subluxation at the 2nd and 3rd MTP joints.  There are chronic appearing changes at  the 2nd MTP joint.  There are no definite acute erosive changes identified.    There is plantar soft tissue ulceration with blister at the skin surface underlying the 2nd and 3rd metatarsal heads.  There is otherwise no drainable fluid collection identified.  There is no radiopaque foreign body.                                       X-Ray Foot Complete Right (Final result)  Result time 12/17/22 10:43:42      Final result by Yoly Mendez MD (12/17/22 10:43:42)                   Impression:      No change since prior      Electronically signed by: Yoly Mendez  Date:    12/17/2022  Time:    10:43               Narrative:    EXAMINATION:  XR FOOT COMPLETE 3 VIEW RIGHT    CLINICAL HISTORY:  . Diabetes mellitus due to underlying condition with foot ulcer    TECHNIQUE:  AP, lateral, and oblique views of the right foot were performed.    COMPARISON:  11/11/2022    FINDINGS:  Patient is status post amputation of the distal 2nd metatarsal.  There is a radiopaque foreign body seen at the base of the 1st toe.  It is unchanged since prior examination.  No fracture seen.  No dislocation is seen.                                       X-Ray Foot Complete Left (Final result)  Result time 12/17/22 10:36:22      Final result by Audi Giles MD (12/17/22 10:36:22)                   Impression:      Degenerative changes.    Dislocations slight subluxation of the 2nd and 3rd metatarsophalangeal joints.    Soft tissue ulceration.    No clear evidence of osteomyelitis.      Electronically signed by: Audi Giles  Date:    12/17/2022  Time:    10:36               Narrative:    EXAMINATION:  XR FOOT COMPLETE 3 VIEW LEFT    CLINICAL HISTORY:  Diabetes mellitus due to underlying condition with foot ulcer    COMPARISON:  None.    FINDINGS:  No acute displaced fractures or dislocations.    There are some degenerative changes of the proximal and distal interphalangeal joints with a slight hilus valgus  deformity    There are persistent dislocations/subluxations involving the 2nd and 3rd metatarsophalangeal joints which appears to be unchanged as compared with the previous exam    There might be soft tissue disruption indicating the presence of an ulceration, however, on the provided images there is no evidence of primary or secondary signs to suggest the presence of osteomyelitis other imaging modalities might prove helpful for further assessment                                       X-Ray Chest PA And Lateral (Final result)  Result time 12/17/22 10:28:04      Final result by Audi Giles MD (12/17/22 10:28:04)                   Impression:      No acute chest disease is identified.      Electronically signed by: Audi Giles  Date:    12/17/2022  Time:    10:28               Narrative:    EXAMINATION:  XR CHEST PA AND LATERAL    CLINICAL HISTORY:  , Cough, unspecified.    COMPARISON:  October 26, 2021    FINDINGS:  No alveolar consolidation, effusion, or pneumothorax is seen.   The thoracic aorta is normal  cardiac silhouette, central pulmonary vessels and mediastinum are normal in size and are grossly unremarkable.   visualized osseous structures are grossly unremarkable.                                      Current Medications:     Infusions:   sodium chloride 0.9% 125 mL/hr at 12/17/22 2140        Scheduled:   aspirin  81 mg Oral Daily    atorvastatin  40 mg Oral Daily    collagenase   Topical (Top) Daily    divalproex  500 mg Oral Daily    enoxaparin  40 mg Subcutaneous Daily    fluticasone furoate-vilanteroL  1 puff Inhalation Daily    gabapentin  300 mg Oral TID    insulin aspart U-100  5 Units Subcutaneous TIDWM    insulin detemir U-100  15 Units Subcutaneous QHS    piperacillin-tazobactam (ZOSYN) IVPB  4.5 g Intravenous Q8H    vancomycin (VANCOCIN) IVPB  1,250 mg Intravenous Q12H        PRN:  albuterol-ipratropium, dextrose 10%, dextrose 10%, glucagon (human recombinant), glucose, glucose,  HYDROcodone-acetaminophen, insulin aspart U-100, naloxone, sodium chloride 0.9%, Pharmacy to dose Vancomycin consult **AND** vancomycin - pharmacy to dose    Antibiotics and Day Number of Therapy:  Vancomycin and Zosyn - Day 1      Intake/Output Summary (Last 24 hours) at 12/18/2022 0931  Last data filed at 12/18/2022 0633  Gross per 24 hour   Intake 3350.1 ml   Output 1300 ml   Net 2050.1 ml       Lines/Drains/Airways       Peripheral Intravenous Line  Duration                  Peripheral IV - Single Lumen 12/17/22 0140 20 G Anterior;Right Forearm 1 day                      Assessment & Plan:   Diabetic Ulcers, Bilateral   Hx of Osteomyelitis of the Feet, Bilateral  Uncontrolled DMII  - HbA1c 11.3% at this time  - Has not taken insulin in over 1 year  - Worsening ulcers, left worse than right on plantar surfaces   - Continue home insulin doses per chart, with moderate sliding scale  - Vancomycin and Zosyn to cover polymicrobial etiologies  - Surgery saw patient day prior, patient states he is not amenable to amputation at this time.   - MRI foot bilateral with and without contrast tomorrow to fully evaluate extent and need for further intervention  - PRN Pain medications in place  - CT foot without obvious osteomyelitis bilaterally  - Gabapentin 300mg on board for diabetic neuropathy     Mild IMAN likely from glucosuric losses - resolved  - Continue NS  - Renal indices improved today     Chronic airway disease, likely COPD no PFTs at this time  Hx of Tobacco Use   - Wheezing, though no in respiratory distress will start home inhalers  - DuoNebs scheduled q6h  - Improving        CODE STATUS: Full Code  Access: Peripheral  Antibiotics: Vanc and Zosyn  Diet: Diabetic  DVT Prophylaxis: Lovenox  GI Prophylaxis: none needed  Fluids: normal saline 125 ml/hr to start after 1L bolus of NS.          Disposition: day 1 of admission for Bilateral diabetic foot, hx of osteomyelitis, surgery consulted and patient not amenable to  amputation at this time will get MRI tomorrow, patient is stable otherwise, pending wound care    Con Anaya MD  [facility] Internal Medicine PGY-1

## 2022-12-18 NOTE — CARE UPDATE
Received CN for resources upon DC; efforts made to speak with patient by calling room and contact information listed- no answer.

## 2022-12-19 LAB
ALBUMIN SERPL-MCNC: 2.4 G/DL (ref 3.4–4.8)
ALBUMIN/GLOB SERPL: 0.7 RATIO (ref 1.1–2)
ALP SERPL-CCNC: 65 UNIT/L (ref 40–150)
ALT SERPL-CCNC: 9 UNIT/L (ref 0–55)
AST SERPL-CCNC: 17 UNIT/L (ref 5–34)
BASOPHILS # BLD AUTO: 0.04 X10(3)/MCL (ref 0–0.2)
BASOPHILS NFR BLD AUTO: 0.9 %
BILIRUBIN DIRECT+TOT PNL SERPL-MCNC: 0.9 MG/DL
BUN SERPL-MCNC: 13.8 MG/DL (ref 8.4–25.7)
CALCIUM SERPL-MCNC: 8.6 MG/DL (ref 8.8–10)
CHLORIDE SERPL-SCNC: 107 MMOL/L (ref 98–107)
CO2 SERPL-SCNC: 23 MMOL/L (ref 23–31)
CREAT SERPL-MCNC: 0.91 MG/DL (ref 0.73–1.18)
EOSINOPHIL # BLD AUTO: 0.14 X10(3)/MCL (ref 0–0.9)
EOSINOPHIL NFR BLD AUTO: 3.1 %
ERYTHROCYTE [DISTWIDTH] IN BLOOD BY AUTOMATED COUNT: 13 % (ref 11.6–14.4)
GFR SERPLBLD CREATININE-BSD FMLA CKD-EPI: >60 MLS/MIN/1.73/M2
GLOBULIN SER-MCNC: 3.3 GM/DL (ref 2.4–3.5)
GLUCOSE SERPL-MCNC: 216 MG/DL (ref 82–115)
HCT VFR BLD AUTO: 40.5 % (ref 42–52)
HGB BLD-MCNC: 14 GM/DL (ref 14–18)
IMM GRANULOCYTES # BLD AUTO: 0.01 X10(3)/MCL (ref 0–0.04)
IMM GRANULOCYTES NFR BLD AUTO: 0.2 %
LYMPHOCYTES # BLD AUTO: 1.46 X10(3)/MCL (ref 0.6–4.6)
LYMPHOCYTES NFR BLD AUTO: 32.3 %
MCH RBC QN AUTO: 33.3 PG
MCHC RBC AUTO-ENTMCNC: 34.6 MG/DL (ref 33–36)
MCV RBC AUTO: 96.4 FL (ref 80–94)
MONOCYTES # BLD AUTO: 0.44 X10(3)/MCL (ref 0.1–1.3)
MONOCYTES NFR BLD AUTO: 9.7 %
NEUTROPHILS # BLD AUTO: 2.43 X10(3)/MCL (ref 2.1–9.2)
NEUTROPHILS NFR BLD AUTO: 53.8 %
NRBC BLD AUTO-RTO: 0 % (ref 0–1)
PLATELET # BLD AUTO: 106 X10(3)/MCL (ref 140–371)
PLATELETS.RETICULATED NFR BLD AUTO: 5.9 % (ref 0.9–11.2)
PMV BLD AUTO: 11.7 FL (ref 9.4–12.4)
POCT GLUCOSE: 204 MG/DL (ref 70–110)
POCT GLUCOSE: 212 MG/DL (ref 70–110)
POCT GLUCOSE: 308 MG/DL (ref 70–110)
POCT GLUCOSE: 324 MG/DL (ref 70–110)
POTASSIUM SERPL-SCNC: 3.8 MMOL/L (ref 3.5–5.1)
PROT SERPL-MCNC: 5.7 GM/DL (ref 5.8–7.6)
RBC # BLD AUTO: 4.2 X10(6)/MCL (ref 4.7–6.1)
SODIUM SERPL-SCNC: 137 MMOL/L (ref 136–145)
VANCOMYCIN TROUGH SERPL-MCNC: 18.4 UG/ML (ref 15–20)
WBC # SPEC AUTO: 4.5 X10(3)/MCL (ref 4.5–11.5)

## 2022-12-19 PROCEDURE — 63600175 PHARM REV CODE 636 W HCPCS: Performed by: INTERNAL MEDICINE

## 2022-12-19 PROCEDURE — 85025 COMPLETE CBC W/AUTO DIFF WBC: CPT

## 2022-12-19 PROCEDURE — 36415 COLL VENOUS BLD VENIPUNCTURE: CPT

## 2022-12-19 PROCEDURE — 99900035 HC TECH TIME PER 15 MIN (STAT)

## 2022-12-19 PROCEDURE — 94761 N-INVAS EAR/PLS OXIMETRY MLT: CPT

## 2022-12-19 PROCEDURE — 25000003 PHARM REV CODE 250

## 2022-12-19 PROCEDURE — 63600175 PHARM REV CODE 636 W HCPCS

## 2022-12-19 PROCEDURE — 25500020 PHARM REV CODE 255

## 2022-12-19 PROCEDURE — 25000242 PHARM REV CODE 250 ALT 637 W/ HCPCS: Performed by: STUDENT IN AN ORGANIZED HEALTH CARE EDUCATION/TRAINING PROGRAM

## 2022-12-19 PROCEDURE — A9577 INJ MULTIHANCE: HCPCS

## 2022-12-19 PROCEDURE — 25000242 PHARM REV CODE 250 ALT 637 W/ HCPCS

## 2022-12-19 PROCEDURE — 25000003 PHARM REV CODE 250: Performed by: INTERNAL MEDICINE

## 2022-12-19 PROCEDURE — 11000001 HC ACUTE MED/SURG PRIVATE ROOM

## 2022-12-19 PROCEDURE — 94640 AIRWAY INHALATION TREATMENT: CPT

## 2022-12-19 PROCEDURE — 80053 COMPREHEN METABOLIC PANEL: CPT

## 2022-12-19 PROCEDURE — 80202 ASSAY OF VANCOMYCIN: CPT

## 2022-12-19 PROCEDURE — 63600175 PHARM REV CODE 636 W HCPCS: Performed by: STUDENT IN AN ORGANIZED HEALTH CARE EDUCATION/TRAINING PROGRAM

## 2022-12-19 RX ORDER — NAPROXEN SODIUM 220 MG/1
81 TABLET, FILM COATED ORAL DAILY
Status: DISCONTINUED | OUTPATIENT
Start: 2022-12-19 | End: 2022-12-30 | Stop reason: HOSPADM

## 2022-12-19 RX ORDER — IPRATROPIUM BROMIDE AND ALBUTEROL SULFATE 2.5; .5 MG/3ML; MG/3ML
3 SOLUTION RESPIRATORY (INHALATION) EVERY 4 HOURS PRN
Status: DISCONTINUED | OUTPATIENT
Start: 2022-12-19 | End: 2022-12-30 | Stop reason: HOSPADM

## 2022-12-19 RX ADMIN — HYDROCODONE BITARTRATE AND ACETAMINOPHEN 1 TABLET: 5; 325 TABLET ORAL at 08:12

## 2022-12-19 RX ADMIN — GABAPENTIN 300 MG: 300 CAPSULE ORAL at 08:12

## 2022-12-19 RX ADMIN — ATORVASTATIN CALCIUM 40 MG: 20 TABLET, FILM COATED ORAL at 11:12

## 2022-12-19 RX ADMIN — IPRATROPIUM BROMIDE AND ALBUTEROL SULFATE 3 ML: 2.5; .5 SOLUTION RESPIRATORY (INHALATION) at 03:12

## 2022-12-19 RX ADMIN — GADOBENATE DIMEGLUMINE 20 ML: 529 INJECTION, SOLUTION INTRAVENOUS at 02:12

## 2022-12-19 RX ADMIN — INSULIN ASPART 8 UNITS: 100 INJECTION, SOLUTION INTRAVENOUS; SUBCUTANEOUS at 04:12

## 2022-12-19 RX ADMIN — SODIUM CHLORIDE: 9 INJECTION, SOLUTION INTRAVENOUS at 04:12

## 2022-12-19 RX ADMIN — COLLAGENASE SANTYL: 250 OINTMENT TOPICAL at 10:12

## 2022-12-19 RX ADMIN — ASPIRIN 81 MG CHEWABLE TABLET 81 MG: 81 TABLET CHEWABLE at 10:12

## 2022-12-19 RX ADMIN — PIPERACILLIN AND TAZOBACTAM 4.5 G: 4; .5 INJECTION, POWDER, LYOPHILIZED, FOR SOLUTION INTRAVENOUS; PARENTERAL at 08:12

## 2022-12-19 RX ADMIN — GABAPENTIN 300 MG: 300 CAPSULE ORAL at 03:12

## 2022-12-19 RX ADMIN — DIVALPROEX SODIUM 500 MG: 500 TABLET, FILM COATED, EXTENDED RELEASE ORAL at 10:12

## 2022-12-19 RX ADMIN — MICONAZOLE NITRATE: 20 CREAM TOPICAL at 10:12

## 2022-12-19 RX ADMIN — IPRATROPIUM BROMIDE AND ALBUTEROL SULFATE 3 ML: 2.5; .5 SOLUTION RESPIRATORY (INHALATION) at 08:12

## 2022-12-19 RX ADMIN — GABAPENTIN 300 MG: 300 CAPSULE ORAL at 10:12

## 2022-12-19 RX ADMIN — INSULIN ASPART 4 UNITS: 100 INJECTION, SOLUTION INTRAVENOUS; SUBCUTANEOUS at 11:12

## 2022-12-19 RX ADMIN — FLUTICASONE FUROATE AND VILANTEROL TRIFENATATE 1 PUFF: 100; 25 POWDER RESPIRATORY (INHALATION) at 07:12

## 2022-12-19 RX ADMIN — SODIUM CHLORIDE: 9 INJECTION, SOLUTION INTRAVENOUS at 03:12

## 2022-12-19 RX ADMIN — ENOXAPARIN SODIUM 40 MG: 40 INJECTION SUBCUTANEOUS at 04:12

## 2022-12-19 RX ADMIN — PIPERACILLIN AND TAZOBACTAM 4.5 G: 4; .5 INJECTION, POWDER, LYOPHILIZED, FOR SOLUTION INTRAVENOUS; PARENTERAL at 11:12

## 2022-12-19 RX ADMIN — INSULIN ASPART 8 UNITS: 100 INJECTION, SOLUTION INTRAVENOUS; SUBCUTANEOUS at 07:12

## 2022-12-19 RX ADMIN — IPRATROPIUM BROMIDE AND ALBUTEROL SULFATE 3 ML: 2.5; .5 SOLUTION RESPIRATORY (INHALATION) at 11:12

## 2022-12-19 RX ADMIN — IPRATROPIUM BROMIDE AND ALBUTEROL SULFATE 3 ML: 2.5; .5 SOLUTION RESPIRATORY (INHALATION) at 07:12

## 2022-12-19 RX ADMIN — INSULIN DETEMIR 20 UNITS: 100 INJECTION, SOLUTION SUBCUTANEOUS at 09:12

## 2022-12-19 RX ADMIN — MICONAZOLE NITRATE: 20 CREAM TOPICAL at 08:12

## 2022-12-19 RX ADMIN — VANCOMYCIN HYDROCHLORIDE 1250 MG: 1 INJECTION, POWDER, LYOPHILIZED, FOR SOLUTION INTRAVENOUS at 02:12

## 2022-12-19 RX ADMIN — VANCOMYCIN HYDROCHLORIDE 1000 MG: 500 INJECTION, POWDER, LYOPHILIZED, FOR SOLUTION INTRAVENOUS at 04:12

## 2022-12-19 RX ADMIN — INSULIN ASPART 4 UNITS: 100 INJECTION, SOLUTION INTRAVENOUS; SUBCUTANEOUS at 08:12

## 2022-12-19 RX ADMIN — PIPERACILLIN AND TAZOBACTAM 4.5 G: 4; .5 INJECTION, POWDER, LYOPHILIZED, FOR SOLUTION INTRAVENOUS; PARENTERAL at 03:12

## 2022-12-19 RX ADMIN — INSULIN DETEMIR 15 UNITS: 100 INJECTION, SOLUTION SUBCUTANEOUS at 11:12

## 2022-12-19 NOTE — PROGRESS NOTES
[facility] Medicine Wards   Progress Note     Resident Team: [facility] Medicine List 1  Attending Physician: Evie Burger MD  Resident: Matt Angulo MD  Intern: Con Anaya MD     Hospital Length of Stay: 2 days    Subjective:      Brief HPI:  Con Arnold is a 62 y.o. male who with a history of chronic osteomyelitis of the foot bilaterally on suppresive doxycycline, htn, uncontrolled DMII, who presented to [facility] ED on 12/16/2022  with complaint of bilateral foot pain, ulcers.     Patient has hx of osteomyelitis of the foot with numerous admissions for antibiotic treatment. Patients was followed in [place] and was on suppressive doxycycline to which he has not taken in 2 months. He states he has run out of supplies for cleaning his foot ulcers, and states he is homeless. Patient was admitted in [place] where he received 1 month of antibiotics in July, and then another month in august in [place] where he had a toe and metatarsals removed from his right foot. He states that it helped however he has recently moved back. His feet are worse over the past month, he has pain though still is able to ambulate. He is with his son and they are trying to get housing although they have been running into difficulties. Patient sought consult due to unbearable pain, will admit for abx, wound assessment and treatment, and blood sugar control.     Of note he has not had insulin in over a year.    Interval History: Patient continues to exclaim foot pain. Otherwise doing well. VSS, NAEON. Will get MRI today. Sugars still in 200s, may need to increase subq insulin. Will need to discuss with case management regarding extent of coverage patient is able to get as well as support. Will discuss with surgery when MRI results if appropriate for further intervention.       Review of Systems:    Review of Systems   Constitutional:  Negative for chills, fever and weight loss.   Respiratory:  Negative for cough and wheezing.    Cardiovascular:   Negative for chest pain and palpitations.   Musculoskeletal:         Foot pain bilaterally   Neurological:  Negative for headaches.           Objective:     Vital Signs (Most Recent):  Temp: 97.5 °F (36.4 °C) (12/19/22 0732)  Pulse: (!) 50 (12/19/22 0732)  Resp: 18 (12/19/22 0732)  BP: 122/76 (12/19/22 0732)  SpO2: 95 % (12/19/22 0732)   Vital Signs (24h Range):  Temp:  [97.2 °F (36.2 °C)-98.1 °F (36.7 °C)] 97.5 °F (36.4 °C)  Pulse:  [50-64] 50  Resp:  [18] 18  SpO2:  [93 %-96 %] 95 %  BP: (111-127)/(62-77) 122/76       Physical Examination:  Constitutional:       General: He is not in acute distress.     Appearance: Normal appearance. He is not ill-appearing.   HENT:      Head: Normocephalic and atraumatic.      Nose: Nose normal.   Eyes:      Extraocular Movements: Extraocular movements intact.      Conjunctiva/sclera: Conjunctivae normal.      Pupils: Pupils are equal, round, and reactive to light.   Cardiovascular:      Rate and Rhythm: Normal rate and regular rhythm.   Pulmonary:      Effort: Pulmonary effort is normal. No respiratory distress.      Breath sounds: CTAB     Comments: Breath sounds distant  Abdominal:      General: Abdomen is flat.      Palpations: Abdomen is soft.   Musculoskeletal:         General: Deformity (hammer toes bilaterally) and signs of injury (Bilateral ulcer plantar surface of foot, left larger than right, no discharges) present. Normal range of motion.      Cervical back: Normal range of motion and neck supple.   Skin:     General: Skin is warm.      Capillary Refill: Capillary refill takes less than 2 seconds.   Neurological:      Mental Status: He is alert.      Sensory: Sensory deficit present.   Psychiatric:         Mood and Affect: Mood normal.         Behavior: Behavior normal.        Laboratory:  Most Recent Data:  CBC:   Recent Labs   Lab 12/18/22  0152 12/19/22 0434   WBC 7.5 4.5   HGB 15.0 14.0   HCT 43.9 40.5*   PLT 80* 106*     CMP:   Recent Labs   Lab 12/19/22 0434    CALCIUM 8.6*   ALBUMIN 2.4*      K 3.8   CO2 23   BUN 13.8   CREATININE 0.91   ALKPHOS 65   ALT 9   AST 17   BILITOT 0.9         Radiology:  Imaging Results              CT Foot W W/O Contrast Bilateral (Final result)  Result time 12/17/22 10:04:45      Final result by Ayse Martinez MD (12/17/22 10:04:45)                   Impression:      1. No definite acute fracture or acute erosive bone changes.  2. Chronic appearing changes at the left 2nd MTP joint.  3. Bilateral plantar surface soft tissue ulcerations and blisters.  Otherwise no deep drainable fluid collection.  4. 3 mm radiopaque foreign body in the plantar surface underlying the right 1st metatarsal head.      Electronically signed by: Ayse Martinez  Date:    12/17/2022  Time:    10:04               Narrative:    EXAMINATION:  CT FOOT W W/O CONTRAST BILATERAL    CLINICAL HISTORY:  Foot pain, chronic, osseous injury suspected;Diabetic Foot, r/o osteo;    TECHNIQUE:  CT images of the bilateral feet with and without contrast.  Axial, coronal, and sagittal reformatted images were obtained. Dose length product is 143 mGycm. Automatic exposure control, adjustment of mA/kV or iterative reconstruction technique was used to limit radiation dose.    COMPARISON:  X-rays dated 12/16/2022    FINDINGS:  Right foot:    There is no acute fracture identified.  There are no definite acute erosive bone changes.    There is a soft tissue ulceration along the plantar surface underlying the 3rd metatarsal head, with blister at the skin surface measuring 5 x 23 mm in size (series 14, image 32).  There is otherwise no drainable fluid collection.  There is a 3 mm radiopaque foreign body in the plantar surface underlying the 1st metatarsal head.    Left foot:    There is no definite acute fracture identified.  There is dorsal subluxation at the 2nd and 3rd MTP joints.  There are chronic appearing changes at the 2nd MTP joint.  There are no definite acute erosive  changes identified.    There is plantar soft tissue ulceration with blister at the skin surface underlying the 2nd and 3rd metatarsal heads.  There is otherwise no drainable fluid collection identified.  There is no radiopaque foreign body.                                       X-Ray Foot Complete Right (Final result)  Result time 12/17/22 10:43:42      Final result by Yoly Mendez MD (12/17/22 10:43:42)                   Impression:      No change since prior      Electronically signed by: Yoly Mendez  Date:    12/17/2022  Time:    10:43               Narrative:    EXAMINATION:  XR FOOT COMPLETE 3 VIEW RIGHT    CLINICAL HISTORY:  . Diabetes mellitus due to underlying condition with foot ulcer    TECHNIQUE:  AP, lateral, and oblique views of the right foot were performed.    COMPARISON:  11/11/2022    FINDINGS:  Patient is status post amputation of the distal 2nd metatarsal.  There is a radiopaque foreign body seen at the base of the 1st toe.  It is unchanged since prior examination.  No fracture seen.  No dislocation is seen.                                       X-Ray Foot Complete Left (Final result)  Result time 12/17/22 10:36:22      Final result by Audi Giles MD (12/17/22 10:36:22)                   Impression:      Degenerative changes.    Dislocations slight subluxation of the 2nd and 3rd metatarsophalangeal joints.    Soft tissue ulceration.    No clear evidence of osteomyelitis.      Electronically signed by: Audi Giles  Date:    12/17/2022  Time:    10:36               Narrative:    EXAMINATION:  XR FOOT COMPLETE 3 VIEW LEFT    CLINICAL HISTORY:  Diabetes mellitus due to underlying condition with foot ulcer    COMPARISON:  None.    FINDINGS:  No acute displaced fractures or dislocations.    There are some degenerative changes of the proximal and distal interphalangeal joints with a slight hilus valgus deformity    There are persistent dislocations/subluxations involving the  2nd and 3rd metatarsophalangeal joints which appears to be unchanged as compared with the previous exam    There might be soft tissue disruption indicating the presence of an ulceration, however, on the provided images there is no evidence of primary or secondary signs to suggest the presence of osteomyelitis other imaging modalities might prove helpful for further assessment                                       X-Ray Chest PA And Lateral (Final result)  Result time 12/17/22 10:28:04      Final result by Audi Giles MD (12/17/22 10:28:04)                   Impression:      No acute chest disease is identified.      Electronically signed by: Audi Giles  Date:    12/17/2022  Time:    10:28               Narrative:    EXAMINATION:  XR CHEST PA AND LATERAL    CLINICAL HISTORY:  , Cough, unspecified.    COMPARISON:  October 26, 2021    FINDINGS:  No alveolar consolidation, effusion, or pneumothorax is seen.   The thoracic aorta is normal  cardiac silhouette, central pulmonary vessels and mediastinum are normal in size and are grossly unremarkable.   visualized osseous structures are grossly unremarkable.                                      Current Medications:     Infusions:   sodium chloride 0.9% 125 mL/hr at 12/19/22 0309        Scheduled:   aspirin  81 mg Oral Daily    atorvastatin  40 mg Oral Daily    collagenase   Topical (Top) Daily    divalproex  500 mg Oral Daily    enoxaparin  40 mg Subcutaneous Daily    fluticasone furoate-vilanteroL  1 puff Inhalation Daily    gabapentin  300 mg Oral TID    insulin aspart U-100  8 Units Subcutaneous TIDWM    insulin detemir U-100  15 Units Subcutaneous QHS    miconazole   Topical (Top) BID    piperacillin-tazobactam (ZOSYN) IVPB  4.5 g Intravenous Q8H    vancomycin (VANCOCIN) IVPB  1,250 mg Intravenous Q12H        PRN:  albuterol-ipratropium, dextrose 10%, dextrose 10%, dextrose 10%, dextrose 10%, glucagon (human recombinant), glucagon (human recombinant),  glucose, glucose, glucose, glucose, HYDROcodone-acetaminophen, insulin aspart U-100, insulin aspart U-100, naloxone, sodium chloride 0.9%, Pharmacy to dose Vancomycin consult **AND** vancomycin - pharmacy to dose    Antibiotics and Day Number of Therapy:  Vancomycin and Zosyn - Day 2      Intake/Output Summary (Last 24 hours) at 12/19/2022 0745  Last data filed at 12/19/2022 0050  Gross per 24 hour   Intake 1760 ml   Output 2551 ml   Net -791 ml       Lines/Drains/Airways       Peripheral Intravenous Line  Duration                  Peripheral IV - Single Lumen 12/17/22 0140 20 G Anterior;Right Forearm 2 days                      Assessment & Plan:   Diabetic Ulcers, Bilateral   Hx of Osteomyelitis of the Feet, Bilateral  Uncontrolled DMII  - HbA1c 11.3% at this time  - Has not taken insulin in over 1 year  - Worsening ulcers, left worse than right on plantar surfaces   - Continue home insulin doses per chart, with moderate sliding scale  - Vancomycin and Zosyn to cover polymicrobial etiologies  - Surgery saw patient day prior, patient states he is not amenable to amputation at this time.   - MRI foot bilateral with and without contrast today, then will discuss with surgery if amputation appropriate  - PRN Pain medications in place  - CT foot without obvious osteomyelitis bilaterally  - Gabapentin 300mg on board for diabetic neuropathy     Mild IMAN likely from glucosuric losses - resolved  - Continue NS  - Renal indices improved      Chronic airway disease, likely COPD no PFTs at this time  Hx of Tobacco Use   - Wheezing, though no in respiratory distress will start home inhalers  - DuoNebs scheduled q6h  - Improving        CODE STATUS: Full Code  Access: Peripheral  Antibiotics: Vanc and Zosyn  Diet: Diabetic  DVT Prophylaxis: Lovenox  GI Prophylaxis: none needed  Fluids: normal saline 125 ml/hr to start after 1L bolus of NS.          Disposition: day 2 of admission for Bilateral diabetic foot, hx of osteomyelitis,  surgery consulted and patient not amenable to amputation at this time will get MRI today patient is stable otherwise, pending wound care if no evidence of osteomyelitis can likely go home on 10-14 days of antibiotics    Con Anaya MD  [facility] Internal Medicine PGY-1
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
## 2022-12-19 NOTE — NURSING
Patient complained of shortness of breathe. Oxygen saturation was 98%. Notified the team and PRN breathing treatment was adjusted. Educated patient to keep head of bed elevated.

## 2022-12-19 NOTE — PLAN OF CARE
Problem: Adult Inpatient Plan of Care  Goal: Plan of Care Review  Outcome: Ongoing, Progressing  Goal: Patient-Specific Goal (Individualized)  Outcome: Ongoing, Progressing  Goal: Absence of Hospital-Acquired Illness or Injury  Outcome: Ongoing, Progressing  Goal: Optimal Comfort and Wellbeing  Outcome: Ongoing, Progressing  Goal: Readiness for Transition of Care  Outcome: Ongoing, Progressing     Problem: Impaired Wound Healing  Goal: Optimal Wound Healing  Outcome: Ongoing, Progressing     Problem: Infection  Goal: Absence of Infection Signs and Symptoms  Outcome: Ongoing, Progressing     Problem: Diabetes Comorbidity  Goal: Blood Glucose Level Within Targeted Range  Outcome: Ongoing, Progressing     Problem: Skin Injury Risk Increased  Goal: Skin Health and Integrity  Outcome: Ongoing, Progressing

## 2022-12-19 NOTE — CONSULTS
Consult per photos.  I have seen this patient in the past in our wound care clinic.  He is bilateral chronic foot calluses with a history of chronic osteomyelitis.  New orders for wound care left foot plantar surface ulcer paint with Betadine apply dry gauze wrapped with Kerlix, and secure with tape.  Place  Darco wedge to left foot.  Right foot plantar surface callus paint with Betadine and apply clean sock perform all daily.  Have patient's schedule an appointment with wound care clinic upon discharge.

## 2022-12-19 NOTE — PROGRESS NOTES
"Inpatient Nutrition Evaluation    Admit Date: 12/16/2022   Total duration of encounter: 3 days    Nutrition Recommendation/Prescription     Diabetic, double protein diet  Boost Max once daily to provide 30 gm protein  Donaldo BID to provide Vit C, Zinc, Arginine for wound healing  Monitor Weights Weekly     Nutrition Assessment     Chart Review    Reason Seen: continuous nutrition monitoring    Diagnosis:  Diabetic Ulcers, h/o osteo of kiko feet, Uncontrolled DM, Mild IMAN, Chronic airway disease likely COPD, h/o tobacco use    Relevant Medical History: Chronic osteomyelitis of kiko feet, HTN, Uncontrolled DM    Nutrition-Related Medications: ASA, Atorvastatin, Insulin, Zosyn, Vanc    Nutrition-Related Labs:  12/16/22 -- Hgb A1C 11.3 H  12/19/22 -- Glu 216 H, K 3.8, BUN 13.8, Cr 0.91    Diet Order: Diet diabetic Double Protein  Oral Supplement Order: none  Appetite/Oral Intake: fair/50-75% of meals  Factors Affecting Nutritional Intake: decreased appetite  Food/Evangelical/Cultural Preferences: none reported  Food Allergies: none reported    Skin Integrity: wound  Wound(s):   Diabetic foot ulcers    Comments    12/19/22 -- Pt reports fair appetite however stating "I force myself to eat" also reports being homeless since Sept of 2020; no indication of significant wt loss; pt with diabetic foot ulcers, pt asking for double protein -- will also order Boost Max & Donaldo to aid in wound healing    Anthropometrics    Height: 6' (182.9 cm) Height Method: Stated  Last Weight: 94.8 kg (209 lb) (12/17/22 1320) Weight Method: Bed Scale  BMI (Calculated): 28.3  BMI Classification: overweight (BMI 25-29.9)        Ideal Body Weight (IBW), Male: 178 lb     % Ideal Body Weight, Male (lb): 117.72 %                          Usual Weight Provided By: patient denies unintentional weight loss    Wt Readings from Last 5 Encounters:   12/17/22 94.8 kg (209 lb)   11/11/22 95.1 kg (209 lb 10.5 oz)   06/06/22 96.6 kg (213 lb)   11/18/21 101.3 kg " (223 lb 5.2 oz)     Weight Change(s) Since Admission:  Admit Weight: 95.1 kg (209 lb 8.8 oz) (12/16/22 2154)  No indication of significant wt loss in the last 6 months, will continue to monitor    Patient Education    Not applicable.    Monitoring & Evaluation     Dietitian will monitor food and beverage intake, weight change, electrolyte/renal panel, glucose/endocrine profile, and gastrointestinal profile.  Nutrition Risk/Follow-Up: low (follow-up in 5-7 days)  Patients assigned 'low nutrition risk' status do not qualify for a full nutritional assessment but will be monitored and re-evaluated in a 5-7 day time period. Please consult if re-evaluation needed sooner.
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
## 2022-12-19 NOTE — PROGRESS NOTES
Pharmacokinetic Assessment Follow Up: IV Vancomycin    Vancomycin serum concentration assessment(s):    The trough level was drawn correctly and can be used to guide therapy at this time. The measurement is within the desired definitive target range of 15 to 20 mcg/mL.    Vancomycin Regimen Plan:    Change regimen to Vancomycin 1000 mg IV every 12 hours with next serum trough concentration measured at 1500 prior to 3rd dose on 12/20/22.     Drug levels (last 3 results):  Recent Labs   Lab Result Units 12/18/22 0152 12/19/22  1445   Vancomycin Trough ug/ml 15.4 18.4       Pharmacy will continue to follow and monitor vancomycin.    Please contact pharmacy at extension [phone] for questions regarding this assessment.    Thank you for the consult,   Roxanne Shine       Patient brief summary:  Con Arnold is a 62 y.o. male initiated on antimicrobial therapy with IV Vancomycin for treatment of bone/joint infection    The patient's current regimen is Vancomycin 1000 mg Q 12H.     Drug Allergies:   Review of patient's allergies indicates:   Allergen Reactions    Sulfa (sulfonamide antibiotics)        Actual Body Weight:   95.1 kg    Renal Function:   Estimated Creatinine Clearance: 98 mL/min (based on SCr of 0.91 mg/dL).,     Dialysis Method (if applicable):  N/A    CBC (last 72 hours):  Recent Labs   Lab Result Units 12/16/22 2214 12/18/22 0152 12/19/22  0434   WBC x10(3)/mcL 11.7* 7.5 4.5   Hgb gm/dL 16.0 15.0 14.0   Hemoglobin A1c % 11.3*  --   --    Hct % 44.6 43.9 40.5*   Platelet x10(3)/mcL 106* 80* 106*   Mono % % 10.1 10.1 9.7   Eos % % 0.3 1.3 3.1   Basophil % % 0.5 0.7 0.9       Metabolic Panel (last 72 hours):  Recent Labs   Lab Result Units 12/16/22 2214 12/18/22 0152 12/19/22  0434   Sodium Level mmol/L 134* 138 137   Potassium Level mmol/L 4.1 3.7 3.8   Chloride mmol/L 99 107 107   Carbon Dioxide mmol/L 23 25 23   Glucose Level mg/dL 370* 184* 216*   Blood Urea Nitrogen mg/dL 20.3 14.7 13.8   Creatinine  mg/dL 1.21* 0.90 0.91   Albumin Level g/dL 3.6 2.7* 2.4*   Bilirubin Total mg/dL 1.4 0.9 0.9   Alkaline Phosphatase unit/L 85 72 65   Aspartate Aminotransferase unit/L 14 16 17   Alanine Aminotransferase unit/L 9 13 9       Vancomycin Administrations:  vancomycin given in the last 96 hours                     vancomycin (VANCOCIN) 1,250 mg in sodium chloride 0.9% 250 mL IVPB (mg) 1,250 mg New Bag 12/19/22 0255     1,250 mg New Bag 12/18/22 1610     1,250 mg New Bag  0311     1,250 mg New Bag 12/17/22 1600     1,250 mg New Bag  0205                    Microbiologic Results:  Microbiology Results (last 7 days)       ** No results found for the last 168 hours. **

## 2022-12-20 LAB
ALBUMIN SERPL-MCNC: 2.4 G/DL (ref 3.4–4.8)
ALBUMIN/GLOB SERPL: 0.7 RATIO (ref 1.1–2)
ALP SERPL-CCNC: 65 UNIT/L (ref 40–150)
ALT SERPL-CCNC: 10 UNIT/L (ref 0–55)
AST SERPL-CCNC: 18 UNIT/L (ref 5–34)
BASOPHILS # BLD AUTO: 0.03 X10(3)/MCL (ref 0–0.2)
BASOPHILS NFR BLD AUTO: 0.8 %
BILIRUBIN DIRECT+TOT PNL SERPL-MCNC: 0.4 MG/DL
BUN SERPL-MCNC: 10.9 MG/DL (ref 8.4–25.7)
CALCIUM SERPL-MCNC: 8.8 MG/DL (ref 8.8–10)
CHLORIDE SERPL-SCNC: 108 MMOL/L (ref 98–107)
CO2 SERPL-SCNC: 23 MMOL/L (ref 23–31)
CREAT SERPL-MCNC: 0.9 MG/DL (ref 0.73–1.18)
EOSINOPHIL # BLD AUTO: 0.09 X10(3)/MCL (ref 0–0.9)
EOSINOPHIL NFR BLD AUTO: 2.3 %
ERYTHROCYTE [DISTWIDTH] IN BLOOD BY AUTOMATED COUNT: 12.9 % (ref 11.6–14.4)
GFR SERPLBLD CREATININE-BSD FMLA CKD-EPI: >60 MLS/MIN/1.73/M2
GLOBULIN SER-MCNC: 3.4 GM/DL (ref 2.4–3.5)
GLUCOSE SERPL-MCNC: 241 MG/DL (ref 82–115)
HCT VFR BLD AUTO: 40.3 % (ref 42–52)
HGB BLD-MCNC: 14 GM/DL (ref 14–18)
IMM GRANULOCYTES # BLD AUTO: 0.02 X10(3)/MCL (ref 0–0.04)
IMM GRANULOCYTES NFR BLD AUTO: 0.5 %
LYMPHOCYTES # BLD AUTO: 1.15 X10(3)/MCL (ref 0.6–4.6)
LYMPHOCYTES NFR BLD AUTO: 29.8 %
MCH RBC QN AUTO: 33.4 PG
MCHC RBC AUTO-ENTMCNC: 34.7 MG/DL (ref 33–36)
MCV RBC AUTO: 96.2 FL (ref 80–94)
MONOCYTES # BLD AUTO: 0.4 X10(3)/MCL (ref 0.1–1.3)
MONOCYTES NFR BLD AUTO: 10.4 %
NEUTROPHILS # BLD AUTO: 2.17 X10(3)/MCL (ref 2.1–9.2)
NEUTROPHILS NFR BLD AUTO: 56.2 %
NRBC BLD AUTO-RTO: 0 % (ref 0–1)
PLATELET # BLD AUTO: 112 X10(3)/MCL (ref 140–371)
PMV BLD AUTO: 11.6 FL (ref 9.4–12.4)
POCT GLUCOSE: 185 MG/DL (ref 70–110)
POCT GLUCOSE: 207 MG/DL (ref 70–110)
POCT GLUCOSE: 221 MG/DL (ref 70–110)
POCT GLUCOSE: 255 MG/DL (ref 70–110)
POTASSIUM SERPL-SCNC: 3.9 MMOL/L (ref 3.5–5.1)
PROT SERPL-MCNC: 5.8 GM/DL (ref 5.8–7.6)
RBC # BLD AUTO: 4.19 X10(6)/MCL (ref 4.7–6.1)
SODIUM SERPL-SCNC: 138 MMOL/L (ref 136–145)
VANCOMYCIN TROUGH SERPL-MCNC: 17.8 UG/ML (ref 15–20)
WBC # SPEC AUTO: 3.9 X10(3)/MCL (ref 4.5–11.5)

## 2022-12-20 PROCEDURE — 25000242 PHARM REV CODE 250 ALT 637 W/ HCPCS

## 2022-12-20 PROCEDURE — 63600175 PHARM REV CODE 636 W HCPCS: Performed by: INTERNAL MEDICINE

## 2022-12-20 PROCEDURE — 94640 AIRWAY INHALATION TREATMENT: CPT

## 2022-12-20 PROCEDURE — 25000003 PHARM REV CODE 250

## 2022-12-20 PROCEDURE — 94761 N-INVAS EAR/PLS OXIMETRY MLT: CPT

## 2022-12-20 PROCEDURE — 36415 COLL VENOUS BLD VENIPUNCTURE: CPT

## 2022-12-20 PROCEDURE — 85025 COMPLETE CBC W/AUTO DIFF WBC: CPT

## 2022-12-20 PROCEDURE — 99900035 HC TECH TIME PER 15 MIN (STAT)

## 2022-12-20 PROCEDURE — 11000001 HC ACUTE MED/SURG PRIVATE ROOM

## 2022-12-20 PROCEDURE — 63600175 PHARM REV CODE 636 W HCPCS

## 2022-12-20 PROCEDURE — 25000003 PHARM REV CODE 250: Performed by: INTERNAL MEDICINE

## 2022-12-20 PROCEDURE — 80053 COMPREHEN METABOLIC PANEL: CPT

## 2022-12-20 PROCEDURE — 80202 ASSAY OF VANCOMYCIN: CPT

## 2022-12-20 RX ORDER — ENOXAPARIN SODIUM 100 MG/ML
40 INJECTION SUBCUTANEOUS EVERY 24 HOURS
Status: DISCONTINUED | OUTPATIENT
Start: 2022-12-20 | End: 2022-12-30 | Stop reason: HOSPADM

## 2022-12-20 RX ADMIN — MICONAZOLE NITRATE: 20 CREAM TOPICAL at 09:12

## 2022-12-20 RX ADMIN — GABAPENTIN 300 MG: 300 CAPSULE ORAL at 09:12

## 2022-12-20 RX ADMIN — ATORVASTATIN CALCIUM 40 MG: 20 TABLET, FILM COATED ORAL at 08:12

## 2022-12-20 RX ADMIN — SODIUM CHLORIDE: 9 INJECTION, SOLUTION INTRAVENOUS at 02:12

## 2022-12-20 RX ADMIN — INSULIN DETEMIR 20 UNITS: 100 INJECTION, SOLUTION SUBCUTANEOUS at 09:12

## 2022-12-20 RX ADMIN — SODIUM CHLORIDE: 9 INJECTION, SOLUTION INTRAVENOUS at 09:12

## 2022-12-20 RX ADMIN — FLUTICASONE FUROATE AND VILANTEROL TRIFENATATE 1 PUFF: 100; 25 POWDER RESPIRATORY (INHALATION) at 07:12

## 2022-12-20 RX ADMIN — ASPIRIN 81 MG CHEWABLE TABLET 81 MG: 81 TABLET CHEWABLE at 08:12

## 2022-12-20 RX ADMIN — INSULIN DETEMIR 15 UNITS: 100 INJECTION, SOLUTION SUBCUTANEOUS at 08:12

## 2022-12-20 RX ADMIN — PIPERACILLIN AND TAZOBACTAM 4.5 G: 4; .5 INJECTION, POWDER, LYOPHILIZED, FOR SOLUTION INTRAVENOUS; PARENTERAL at 12:12

## 2022-12-20 RX ADMIN — ENOXAPARIN SODIUM 40 MG: 40 INJECTION SUBCUTANEOUS at 04:12

## 2022-12-20 RX ADMIN — VANCOMYCIN HYDROCHLORIDE 1000 MG: 500 INJECTION, POWDER, LYOPHILIZED, FOR SOLUTION INTRAVENOUS at 04:12

## 2022-12-20 RX ADMIN — DIVALPROEX SODIUM 500 MG: 500 TABLET, FILM COATED, EXTENDED RELEASE ORAL at 08:12

## 2022-12-20 RX ADMIN — VANCOMYCIN HYDROCHLORIDE 1000 MG: 500 INJECTION, POWDER, LYOPHILIZED, FOR SOLUTION INTRAVENOUS at 03:12

## 2022-12-20 RX ADMIN — PIPERACILLIN AND TAZOBACTAM 4.5 G: 4; .5 INJECTION, POWDER, LYOPHILIZED, FOR SOLUTION INTRAVENOUS; PARENTERAL at 03:12

## 2022-12-20 RX ADMIN — COLLAGENASE SANTYL: 250 OINTMENT TOPICAL at 08:12

## 2022-12-20 RX ADMIN — PIPERACILLIN AND TAZOBACTAM 4.5 G: 4; .5 INJECTION, POWDER, LYOPHILIZED, FOR SOLUTION INTRAVENOUS; PARENTERAL at 09:12

## 2022-12-20 RX ADMIN — IPRATROPIUM BROMIDE AND ALBUTEROL SULFATE 3 ML: 2.5; .5 SOLUTION RESPIRATORY (INHALATION) at 07:12

## 2022-12-20 RX ADMIN — GABAPENTIN 300 MG: 300 CAPSULE ORAL at 08:12

## 2022-12-20 RX ADMIN — GABAPENTIN 300 MG: 300 CAPSULE ORAL at 04:12

## 2022-12-20 RX ADMIN — INSULIN ASPART 6 UNITS: 100 INJECTION, SOLUTION INTRAVENOUS; SUBCUTANEOUS at 12:12

## 2022-12-20 RX ADMIN — IPRATROPIUM BROMIDE AND ALBUTEROL SULFATE 3 ML: 2.5; .5 SOLUTION RESPIRATORY (INHALATION) at 01:12

## 2022-12-20 NOTE — PROGRESS NOTES
[facility] Medicine Wards   Progress Note     Resident Team: [facility] Medicine List 1  Attending Physician: Evie Burger MD  Resident: Matt Angulo MD  Intern: Con Anaya MD     Hospital Length of Stay: 3 days    Subjective:      Brief HPI:  Con Arnold is a 62 y.o. male who with a history of chronic osteomyelitis of the foot bilaterally on suppresive doxycycline, htn, uncontrolled DMII, who presented to [facility] ED on 12/16/2022  with complaint of bilateral foot pain, ulcers.     Patient has hx of osteomyelitis of the foot with numerous admissions for antibiotic treatment. Patients was followed in ID and was on suppressive doxycycline to which he has not taken in 2 months. He states he has run out of supplies for cleaning his foot ulcers, and states he is homeless. Patient was admitted in [place] where he received 1 month of antibiotics in July, and then another month in august in [place] where he had a toe and metatarsals removed from his right foot. He states that it helped however he has recently moved back. His feet are worse over the past month, he has pain though still is able to ambulate. He is with his son and they are trying to get housing although they have been running into difficulties. Patient sought consult due to unbearable pain, will admit for abx, wound assessment and treatment, and blood sugar control.     Of note he has not had insulin in over a year.    Interval History: Patient continues to exclaim foot pain. Otherwise doing well. VSS, NAEON. MRI of left foot shows osteomyelitis with septic arthritis. Will need to discuss with case management regarding extent of coverage patient is able to get as well as support. Surgery with plans for BKA tomorrow. NPO midnight.      Review of Systems:    Review of Systems   Constitutional:  Negative for chills, fever and weight loss.   Respiratory:  Negative for cough and wheezing.    Cardiovascular:  Negative for chest pain and palpitations.    Musculoskeletal:         Foot pain bilaterally   Neurological:  Negative for headaches.           Objective:     Vital Signs (Most Recent):  Temp: 97.6 °F (36.4 °C) (12/20/22 0743)  Pulse: 63 (12/20/22 0743)  Resp: 18 (12/20/22 0743)  BP: 130/68 (12/20/22 0744)  SpO2: 96 % (12/20/22 0743)   Vital Signs (24h Range):  Temp:  [97.5 °F (36.4 °C)-98.1 °F (36.7 °C)] 97.6 °F (36.4 °C)  Pulse:  [58-88] 63  Resp:  [18-20] 18  SpO2:  [94 %-99 %] 96 %  BP: (130-145)/(68-83) 130/68       Physical Examination:  Constitutional:       General: He is not in acute distress.     Appearance: Normal appearance. He is not ill-appearing.   HENT:      Head: Normocephalic and atraumatic.      Nose: Nose normal.   Eyes:      Extraocular Movements: Extraocular movements intact.      Conjunctiva/sclera: Conjunctivae normal.      Pupils: Pupils are equal, round, and reactive to light.   Cardiovascular:      Rate and Rhythm: Normal rate and regular rhythm.   Pulmonary:      Effort: Pulmonary effort is normal. No respiratory distress.      Breath sounds: CTAB     Comments: Breath sounds distant, no wheezing  Abdominal:      General: Abdomen is flat.      Palpations: Abdomen is soft.   Musculoskeletal:         General: Deformity (hammer toes bilaterally) and signs of injury (Bilateral ulcer plantar surface of foot, left larger than right, no discharges) present. Normal range of motion.      Cervical back: Normal range of motion and neck supple.   Skin:     General: Skin is warm.      Capillary Refill: Capillary refill takes less than 2 seconds.   Neurological:      Mental Status: He is alert.      Sensory: Sensory deficit present.   Psychiatric:         Mood and Affect: Mood normal.         Behavior: Behavior normal.        Laboratory:  Most Recent Data:  CBC:   Recent Labs   Lab 12/19/22  0434 12/20/22  0420   WBC 4.5 3.9*   HGB 14.0 14.0   HCT 40.5* 40.3*   * 112*       CMP:   Recent Labs   Lab 12/20/22  0420   CALCIUM 8.8   ALBUMIN  2.4*      K 3.9   CO2 23   BUN 10.9   CREATININE 0.90   ALKPHOS 65   ALT 10   AST 18   BILITOT 0.4           Radiology:  Imaging Results              CT Foot W W/O Contrast Bilateral (Final result)  Result time 12/17/22 10:04:45      Final result by Ayse Martinez MD (12/17/22 10:04:45)                   Impression:      1. No definite acute fracture or acute erosive bone changes.  2. Chronic appearing changes at the left 2nd MTP joint.  3. Bilateral plantar surface soft tissue ulcerations and blisters.  Otherwise no deep drainable fluid collection.  4. 3 mm radiopaque foreign body in the plantar surface underlying the right 1st metatarsal head.      Electronically signed by: Ayse Martinez  Date:    12/17/2022  Time:    10:04               Narrative:    EXAMINATION:  CT FOOT W W/O CONTRAST BILATERAL    CLINICAL HISTORY:  Foot pain, chronic, osseous injury suspected;Diabetic Foot, r/o osteo;    TECHNIQUE:  CT images of the bilateral feet with and without contrast.  Axial, coronal, and sagittal reformatted images were obtained. Dose length product is 143 mGycm. Automatic exposure control, adjustment of mA/kV or iterative reconstruction technique was used to limit radiation dose.    COMPARISON:  X-rays dated 12/16/2022    FINDINGS:  Right foot:    There is no acute fracture identified.  There are no definite acute erosive bone changes.    There is a soft tissue ulceration along the plantar surface underlying the 3rd metatarsal head, with blister at the skin surface measuring 5 x 23 mm in size (series 14, image 32).  There is otherwise no drainable fluid collection.  There is a 3 mm radiopaque foreign body in the plantar surface underlying the 1st metatarsal head.    Left foot:    There is no definite acute fracture identified.  There is dorsal subluxation at the 2nd and 3rd MTP joints.  There are chronic appearing changes at the 2nd MTP joint.  There are no definite acute erosive changes  identified.    There is plantar soft tissue ulceration with blister at the skin surface underlying the 2nd and 3rd metatarsal heads.  There is otherwise no drainable fluid collection identified.  There is no radiopaque foreign body.                                       X-Ray Foot Complete Right (Final result)  Result time 12/17/22 10:43:42      Final result by Yoly Mendez MD (12/17/22 10:43:42)                   Impression:      No change since prior      Electronically signed by: Yoly Mendez  Date:    12/17/2022  Time:    10:43               Narrative:    EXAMINATION:  XR FOOT COMPLETE 3 VIEW RIGHT    CLINICAL HISTORY:  . Diabetes mellitus due to underlying condition with foot ulcer    TECHNIQUE:  AP, lateral, and oblique views of the right foot were performed.    COMPARISON:  11/11/2022    FINDINGS:  Patient is status post amputation of the distal 2nd metatarsal.  There is a radiopaque foreign body seen at the base of the 1st toe.  It is unchanged since prior examination.  No fracture seen.  No dislocation is seen.                                       X-Ray Foot Complete Left (Final result)  Result time 12/17/22 10:36:22      Final result by Audi Giles MD (12/17/22 10:36:22)                   Impression:      Degenerative changes.    Dislocations slight subluxation of the 2nd and 3rd metatarsophalangeal joints.    Soft tissue ulceration.    No clear evidence of osteomyelitis.      Electronically signed by: Audi Giles  Date:    12/17/2022  Time:    10:36               Narrative:    EXAMINATION:  XR FOOT COMPLETE 3 VIEW LEFT    CLINICAL HISTORY:  Diabetes mellitus due to underlying condition with foot ulcer    COMPARISON:  None.    FINDINGS:  No acute displaced fractures or dislocations.    There are some degenerative changes of the proximal and distal interphalangeal joints with a slight hilus valgus deformity    There are persistent dislocations/subluxations involving the 2nd and  3rd metatarsophalangeal joints which appears to be unchanged as compared with the previous exam    There might be soft tissue disruption indicating the presence of an ulceration, however, on the provided images there is no evidence of primary or secondary signs to suggest the presence of osteomyelitis other imaging modalities might prove helpful for further assessment                                       X-Ray Chest PA And Lateral (Final result)  Result time 12/17/22 10:28:04      Final result by Audi Giles MD (12/17/22 10:28:04)                   Impression:      No acute chest disease is identified.      Electronically signed by: Audi Giles  Date:    12/17/2022  Time:    10:28               Narrative:    EXAMINATION:  XR CHEST PA AND LATERAL    CLINICAL HISTORY:  , Cough, unspecified.    COMPARISON:  October 26, 2021    FINDINGS:  No alveolar consolidation, effusion, or pneumothorax is seen.   The thoracic aorta is normal  cardiac silhouette, central pulmonary vessels and mediastinum are normal in size and are grossly unremarkable.   visualized osseous structures are grossly unremarkable.                                      Current Medications:     Infusions:   sodium chloride 0.9% 125 mL/hr at 12/20/22 0223        Scheduled:   aspirin  81 mg Oral Daily    atorvastatin  40 mg Oral Daily    collagenase   Topical (Top) Daily    divalproex  500 mg Oral Daily    enoxaparin  40 mg Subcutaneous Daily    fluticasone furoate-vilanteroL  1 puff Inhalation Daily    gabapentin  300 mg Oral TID    insulin detemir U-100  15 Units Subcutaneous Daily    insulin detemir U-100  20 Units Subcutaneous QHS    miconazole   Topical (Top) BID    piperacillin-tazobactam (ZOSYN) IVPB  4.5 g Intravenous Q8H    vancomycin (VANCOCIN) IVPB  1,000 mg Intravenous Q12H        PRN:  albuterol-ipratropium, dextrose 10%, dextrose 10%, glucagon (human recombinant), glucose, glucose, HYDROcodone-acetaminophen, insulin aspart U-100,  naloxone, sodium chloride 0.9%, Pharmacy to dose Vancomycin consult **AND** vancomycin - pharmacy to dose    Antibiotics and Day Number of Therapy:  Vancomycin and Zosyn - Day 3      Intake/Output Summary (Last 24 hours) at 12/20/2022 0912  Last data filed at 12/20/2022 0546  Gross per 24 hour   Intake 1990 ml   Output 1400 ml   Net 590 ml         Lines/Drains/Airways       Peripheral Intravenous Line  Duration                  Peripheral IV - Double Lumen 12/20/22 0635 20 G Anterior;Distal;Right Forearm <1 day                  MRI Foot (Forefoot) Left W W/O Contrast 12/19/2022    Narrative  EXAMINATION:  MRI FOOT (FOREFOOT) LEFT W W/O CONTRAST    CLINICAL HISTORY:  Foot swelling, diabetic, osteomyelitis suspected, xray done;    TECHNIQUE:  Enhanced and unenhanced, multiplanar, multisequence MR imaging of the left foot was obtained.    COMPARISON:  MRI contralateral foot used for comparison dated 12/19/2022.  CT scan used for comparison dated 12/17/2022    FINDINGS:  Incongruent hallux valgus deformity.  Trabecular edema present to the distal phalanx of the great toe is equivocal.  Trabecular edema and an effusion present to the 1st metatarsophalangeal joint may represent early osteomyelitis.  Destructive osteomyelitis and septic arthritis are present to the 2nd and 3rd metatarsophalangeal joints with dorsal dislocation of the bases of the proximal phalanges and full-thickness tears of the plantar plates.  The flexor tendon of the 2nd toe is largely torn.  The 3rd flexor tendon is medially dislocated but appears structurally intact.  An abscess is present about the 3rd metatarsal head and measures 3.5 cm x 2 cm in size.  It likely represents decompressed septic arthritis.    Myositis is present to the anterior intrinsic muscles.  An ulcer is present to the plantar aspect of the forefoot with necrotic tissue present in the ulcer base.  This measures approximately 4.5 cm x 3 cm and is nicely demonstrated on image 19  of series 9.  Suppurative flexor tenosynovitis is present to the midfoot.    Impression  Destructive septic arthritis/osteomyelitis is present to the 2nd and 3rd tarsal metatarsal joints with dorsal dislocation of the bases of the proximal phalanges and full-thickness tearing of the plantar plates.  The 2nd flexor tendon is largely torn.  The 3rd flexor tendon is dislocated medially.  A 3.5 cm x 2 cm abscess is present about the 3rd metatarsal head and likely represents decompressed septic arthritis.    Septic arthritis is likely present to the 1st metatarsal phalangeal joint.  Edema and trace enhancement seen to the distal phalanx of the great toe is equivocal..    Suppurative flexor tenosynovitis.  An ulcer is present to the plantar aspect of the forefoot with 4.5 cm of necrotic tissue present in the ulcer base.  This is nicely demonstrated on image 19 of series 9.    Myositis to the anterior intrinsic muscles.  Cellulitis is present to the forefoot.      Electronically signed by: Kenneth Madsen  Date:    12/19/2022  Time:    17:16        Assessment & Plan:   Diabetic Ulcers, Bilateral   Hx of Osteomyelitis of the Feet, Bilateral  Uncontrolled DMII  - HbA1c 11.3% at this time  - Has not taken insulin in over 1 year  - Worsening ulcers, left worse than right on plantar surfaces   - Started Long acting Levemir 20 units QHS, 15 units am (CBG post prandial at 300s, morning glucose normals)  - Vancomycin and Zosyn to cover polymicrobial etiologies  - Surgery on board, saw patient in AM, amenable to surgical intervention  - MRI foot bilateral - Osteomyelitis, septic arthritis in left foot  - BKA planned for tomorrow NPO at Midnight  - PRN Pain medications in place  - CT foot without obvious osteomyelitis bilaterally  - Gabapentin 300mg on board for diabetic neuropathy  - Wound care consulted     Mild IMAN likely from glucosuric losses - resolved  - Continue NS  - Renal indices improved      Chronic airway disease, likely  COPD no PFTs at this time  Hx of Tobacco Use     - DuoNebs scheduled q6h PRN  - Improving        CODE STATUS: Full Code  Access: Peripheral  Antibiotics: Vanc and Zosyn  Diet: Diabetic  DVT Prophylaxis: Lovenox, will hold tonight for planned BKA tomorrow  GI Prophylaxis: none needed  Fluids: normal saline 125 ml/hr to start after 1L bolus of NS.          Disposition: day 3 of admission for Bilateral diabetic foot, hx of osteomyelitis,will continue antiobitocs day 3, planned BKA tomorrow, after can d/c antibiotics after 3-5 days, continuing to manage sugars    Con Anaya MD  [facility] PGY-1

## 2022-12-20 NOTE — PLAN OF CARE
Patient's friend, Subhash Garcia, stated that they have found an apartment to live in and will get utilities connected today. Faxed proof of residence to [employer], F: [phone] per patient's request. New address is: [address] [place] [id number].

## 2022-12-20 NOTE — PROGRESS NOTES
Pharmacokinetic Assessment Follow Up: IV Vancomycin    Vancomycin serum concentration assessment(s):    The trough level was drawn correctly and can be used to guide therapy at this time. The measurement is within the desired definitive target range of 15 to 20 mcg/mL.    Vancomycin Regimen Plan:    Continue regimen to Vancomycin 1000 mg IV every 12 hours with next serum trough concentration measured at 1500 prior to 5th dose on 12/22/22.     Drug levels (last 3 results):  Recent Labs   Lab Result Units 12/18/22  0152 12/19/22  1445 12/20/22  1456   Vancomycin Trough ug/ml 15.4 18.4 17.8       Pharmacy will continue to follow and monitor vancomycin.    Please contact pharmacy at extension [phone] for questions regarding this assessment.    Thank you for the consult,   Roxanne Shine       Patient brief summary:  Con Arnold is a 62 y.o. male initiated on antimicrobial therapy with IV Vancomycin for treatment of bone/joint infection    The patient's current regimen is Vancomycin 1000 mg Q 12H.     Drug Allergies:   Review of patient's allergies indicates:   Allergen Reactions    Sulfa (sulfonamide antibiotics)        Actual Body Weight:   95.1 kg    Renal Function:   Estimated Creatinine Clearance: 99.1 mL/min (based on SCr of 0.9 mg/dL).,     Dialysis Method (if applicable):  N/A    CBC (last 72 hours):  Recent Labs   Lab Result Units 12/18/22  0152 12/19/22  0434 12/20/22  0420   WBC x10(3)/mcL 7.5 4.5 3.9*   Hgb gm/dL 15.0 14.0 14.0   Hct % 43.9 40.5* 40.3*   Platelet x10(3)/mcL 80* 106* 112*   Mono % % 10.1 9.7 10.4   Eos % % 1.3 3.1 2.3   Basophil % % 0.7 0.9 0.8       Metabolic Panel (last 72 hours):  Recent Labs   Lab Result Units 12/18/22  0152 12/19/22  0434 12/20/22  0420   Sodium Level mmol/L 138 137 138   Potassium Level mmol/L 3.7 3.8 3.9   Chloride mmol/L 107 107 108*   Carbon Dioxide mmol/L 25 23 23   Glucose Level mg/dL 184* 216* 241*   Blood Urea Nitrogen mg/dL 14.7 13.8 10.9   Creatinine mg/dL 0.90  0.91 0.90   Albumin Level g/dL 2.7* 2.4* 2.4*   Bilirubin Total mg/dL 0.9 0.9 0.4   Alkaline Phosphatase unit/L 72 65 65   Aspartate Aminotransferase unit/L 16 17 18   Alanine Aminotransferase unit/L 13 9 10       Vancomycin Administrations:  vancomycin given in the last 96 hours                     vancomycin (VANCOCIN) 1,000 mg in sodium chloride 0.9 % 250 mL IVPB (MB+) (mg) 1,000 mg New Bag 12/20/22 0328     1,000 mg New Bag 12/19/22 1644    vancomycin (VANCOCIN) 1,250 mg in sodium chloride 0.9% 250 mL IVPB (mg) 1,250 mg New Bag 12/19/22 0255     1,250 mg New Bag 12/18/22 1610     1,250 mg New Bag  0311     1,250 mg New Bag 12/17/22 1600     1,250 mg New Bag  0205                    Microbiologic Results:  Microbiology Results (last 7 days)       ** No results found for the last 168 hours. **

## 2022-12-20 NOTE — PROGRESS NOTES
GENERAL SURGERY  PROGRESS NOTE    Admit Date: 12/16/2022  Hospital Day: 3      NAEON  AFVSS  MRI with L foot OM  Consult for evaluation  Patient amenable to BKA      Physical Exam:  Gen: NAD  HEENT: anicteric sclera, MMM  Chest: RR, unlabored respirations  Abd: s/nt/nd  Left foot with wound overlying distal middle metarsals with prominent overlying callous and no significant purulence  Right foot with wound overlying distal middle metarsals with prominent overlying callous and no significant purulence, smaller in size than left  (See clinical media)      Assessment:  Con Arnold is a 62 y.o.m with PMH T2DM, HLD, cataracts, chronic osteomylelitis of the bilateral feet, s/p r/s 2nd digit ray amp with bilateral chronic diabetic foot wounds, now w/ c/f L foot OM on MRI       Plan:  - amenable to L BKA  - NPOmn  - OK for diet today ordered  - OR tomorrow      Inpatient Data      Vitals:   Temp:  [97.5 °F (36.4 °C)-98.1 °F (36.7 °C)]   Pulse:  [50-88]   Resp:  [18-20]   BP: (122-145)/(71-83)   SpO2:  [93 %-99 %]     Diet NPO  Diet diabetic   Intake/Output Summary (Last 24 hours) at 12/20/2022 0721  Last data filed at 12/20/2022 0546  Gross per 24 hour   Intake 1990 ml   Output 1400 ml   Net 590 ml          Recent Labs     12/18/22  0152 12/19/22  0434 12/20/22  0420   WBC 7.5 4.5 3.9*   HGB 15.0 14.0 14.0   HCT 43.9 40.5* 40.3*   PLT 80* 106* 112*    137 138   K 3.7 3.8 3.9   CO2 25 23 23   BUN 14.7 13.8 10.9   CREATININE 0.90 0.91 0.90   BILITOT 0.9 0.9 0.4   AST 16 17 18   ALT 13 9 10   ALKPHOS 72 65 65   CALCIUM 8.9 8.6* 8.8   ALBUMIN 2.7* 2.4* 2.4*     Scheduled Meds: aspirin, 81 mg, Daily  atorvastatin, 40 mg, Daily  collagenase, , Daily  divalproex, 500 mg, Daily  enoxaparin, 40 mg, Daily  fluticasone furoate-vilanteroL, 1 puff, Daily  gabapentin, 300 mg, TID  insulin detemir U-100, 15 Units, Daily  insulin detemir U-100, 20 Units, QHS  miconazole, , BID  piperacillin-tazobactam (ZOSYN) IVPB, 4.5 g,  Q8H  vancomycin (VANCOCIN) IVPB, 1,000 mg, Q12H     sodium chloride 0.9% 125 mL/hr at 12/20/22 8910

## 2022-12-20 NOTE — PROGRESS NOTES
Progress Note  General Surgery      SUBJECTIVE:     AFVSS   Surgery called to re-evaluate given MRI c/w osteo of left foot  Pt now amenable to L BKA    OBJECTIVE:     Vital Signs (Most Recent)  Temp: 97.6 °F (36.4 °C) (12/20/22 0743)  Pulse: 63 (12/20/22 0743)  Resp: 18 (12/20/22 0743)  BP: 130/68 (12/20/22 0744)  SpO2: 96 % (12/20/22 0743)    Vital Signs Range (Last 24H):  Temp:  [97.5 °F (36.4 °C)-98.1 °F (36.7 °C)]   Pulse:  [58-88]   Resp:  [18-20]   BP: (130-145)/(68-83)   SpO2:  [94 %-99 %]     I & O (Last 24H):  Intake/Output Summary (Last 24 hours) at 12/20/2022 1043  Last data filed at 12/20/2022 0546  Gross per 24 hour   Intake 1990 ml   Output 1400 ml   Net 590 ml     Physical Exam:  /68   Pulse 63   Temp 97.6 °F (36.4 °C) (Oral)   Resp 18   Ht 6' (1.829 m)   Wt 94.8 kg (209 lb)   SpO2 96%   BMI 28.35 kg/m²   General appearance: alert, appears stated age, and cooperative  HEENT:  EOMI , NCAT, hearing intact to conversation  Pulm: no increased WOB; equal cheat rise bilat  Heart: RRR  Extremities: bilateral feet dressed with kerlix and ACE wrap with no strikethrough into dressing         Anesthesia/Surgery risks, benefits and alternative options discussed and understood by patient/family.       Laboratory:  Recent Labs     12/18/22 0152 12/19/22  0434 12/20/22  0420   WBC 7.5 4.5 3.9*   HGB 15.0 14.0 14.0   HCT 43.9 40.5* 40.3*   PLT 80* 106* 112*     Recent Labs     12/18/22 0152 12/19/22  0434 12/20/22  0420    137 138   K 3.7 3.8 3.9   CO2 25 23 23   BUN 14.7 13.8 10.9   CREATININE 0.90 0.91 0.90   CALCIUM 8.9 8.6* 8.8   ALBUMIN 2.7* 2.4* 2.4*   BILITOT 0.9 0.9 0.4   AST 16 17 18   ALKPHOS 72 65 65   ALT 13 9 10     Diagnostic Results:    MRI Foot (Forefoot) Left W W/O Contrast  Destructive septic arthritis/osteomyelitis is present to the 2nd and 3rd tarsal metatarsal joints with dorsal dislocation of the bases of the proximal phalanges and full-thickness tearing of the plantar  plates.  The 2nd flexor tendon is largely torn.  The 3rd flexor tendon is dislocated medially.  A 3.5 cm x 2 cm abscess is present about the 3rd metatarsal head and likely represents decompressed septic arthritis. Septic arthritis is likely present to the 1st metatarsal phalangeal joint.  Edema and trace enhancement seen to the distal phalanx of the great toe is equivocal.. Suppurative flexor tenosynovitis.  An ulcer is present to the plantar aspect of the forefoot with 4.5 cm of necrotic tissue present in the ulcer base.  This is nicely demonstrated on image 19 of series 9. Myositis to the anterior intrinsic muscles.  Cellulitis is present to the forefoot.     MRI Foot (Forefoot) Right W W/O Contrast  Cellulitis is present to the plantar aspect of the forefoot without abscess.  A tiny focus of susceptibility artifact is present to the plantar aspect of the 1st metatarsophalangeal joint and may represent a retained metallic foreign body. The 2nd toe has been amputated at the level of the mid metatarsal.  No osteomyelitis demonstrated on today's study.  Subtle trabecular edema present on both sides of the 5th metatarsophalangeal joint is likely reactive.       ASSESSMENT/PLAN:     Con Arnold is a 62 y.o.m with PMH T2DM, HLD, cataracts, chronic osteomylelitis of the bilateral feet, s/p r/s 2nd digit ray amp with bilateral chronic diabetic foot wounds. MRI of left foot with OM of 2nd and 3rd T-MT joints.    -recommend L BKA; pt now amenable to this procedure; will obtain written informed consent and post for 12/21  -npo @mn  -abx and other care per primary

## 2022-12-21 ENCOUNTER — ANESTHESIA EVENT (OUTPATIENT)
Dept: SURGERY | Facility: HOSPITAL | Age: 62
DRG: 617 | End: 2022-12-21
Payer: MEDICARE

## 2022-12-21 ENCOUNTER — ANESTHESIA (OUTPATIENT)
Dept: SURGERY | Facility: HOSPITAL | Age: 62
DRG: 617 | End: 2022-12-21
Payer: MEDICARE

## 2022-12-21 VITALS
OXYGEN SATURATION: 97 % | DIASTOLIC BLOOD PRESSURE: 40 MMHG | SYSTOLIC BLOOD PRESSURE: 76 MMHG | HEART RATE: 87 BPM | RESPIRATION RATE: 16 BRPM

## 2022-12-21 LAB
ABORH RETYPE: NORMAL
ALBUMIN SERPL-MCNC: 2.6 G/DL (ref 3.4–4.8)
ALBUMIN/GLOB SERPL: 0.7 RATIO (ref 1.1–2)
ALP SERPL-CCNC: 77 UNIT/L (ref 40–150)
ALT SERPL-CCNC: 25 UNIT/L (ref 0–55)
AST SERPL-CCNC: 37 UNIT/L (ref 5–34)
BASOPHILS # BLD AUTO: 0.05 X10(3)/MCL (ref 0–0.2)
BASOPHILS NFR BLD AUTO: 0.9 %
BILIRUBIN DIRECT+TOT PNL SERPL-MCNC: 0.5 MG/DL
BUN SERPL-MCNC: 12.9 MG/DL (ref 8.4–25.7)
CALCIUM SERPL-MCNC: 9.4 MG/DL (ref 8.8–10)
CHLORIDE SERPL-SCNC: 105 MMOL/L (ref 98–107)
CO2 SERPL-SCNC: 23 MMOL/L (ref 23–31)
CREAT SERPL-MCNC: 0.81 MG/DL (ref 0.73–1.18)
EOSINOPHIL # BLD AUTO: 0.11 X10(3)/MCL (ref 0–0.9)
EOSINOPHIL NFR BLD AUTO: 2.1 %
ERYTHROCYTE [DISTWIDTH] IN BLOOD BY AUTOMATED COUNT: 12.7 % (ref 11.6–14.4)
GFR SERPLBLD CREATININE-BSD FMLA CKD-EPI: >60 MLS/MIN/1.73/M2
GLOBULIN SER-MCNC: 4 GM/DL (ref 2.4–3.5)
GLUCOSE SERPL-MCNC: 157 MG/DL (ref 82–115)
GROUP & RH: NORMAL
HCT VFR BLD AUTO: 44.4 % (ref 42–52)
HGB BLD-MCNC: 15.6 GM/DL (ref 14–18)
IMM GRANULOCYTES # BLD AUTO: 0.04 X10(3)/MCL (ref 0–0.04)
IMM GRANULOCYTES NFR BLD AUTO: 0.7 %
INDIRECT COOMBS GEL: NORMAL
LYMPHOCYTES # BLD AUTO: 1.66 X10(3)/MCL (ref 0.6–4.6)
LYMPHOCYTES NFR BLD AUTO: 31 %
MCH RBC QN AUTO: 33.1 PG
MCHC RBC AUTO-ENTMCNC: 35.1 MG/DL (ref 33–36)
MCV RBC AUTO: 94.1 FL (ref 80–94)
MONOCYTES # BLD AUTO: 0.67 X10(3)/MCL (ref 0.1–1.3)
MONOCYTES NFR BLD AUTO: 12.5 %
NEUTROPHILS # BLD AUTO: 2.82 X10(3)/MCL (ref 2.1–9.2)
NEUTROPHILS NFR BLD AUTO: 52.8 %
NRBC BLD AUTO-RTO: 0 % (ref 0–1)
PLATELET # BLD AUTO: 139 X10(3)/MCL (ref 140–371)
PLATELETS.RETICULATED NFR BLD AUTO: 5.5 % (ref 0.9–11.2)
PMV BLD AUTO: 12.5 FL (ref 9.4–12.4)
POCT GLUCOSE: 113 MG/DL (ref 70–110)
POCT GLUCOSE: 142 MG/DL (ref 70–110)
POCT GLUCOSE: 155 MG/DL (ref 70–110)
POCT GLUCOSE: 372 MG/DL (ref 70–110)
POCT GLUCOSE: 420 MG/DL (ref 70–110)
POCT GLUCOSE: 97 MG/DL (ref 70–110)
POTASSIUM SERPL-SCNC: 4.1 MMOL/L (ref 3.5–5.1)
PROT SERPL-MCNC: 6.6 GM/DL (ref 5.8–7.6)
RBC # BLD AUTO: 4.72 X10(6)/MCL (ref 4.7–6.1)
SODIUM SERPL-SCNC: 137 MMOL/L (ref 136–145)
WBC # SPEC AUTO: 5.4 X10(3)/MCL (ref 4.5–11.5)

## 2022-12-21 PROCEDURE — 80053 COMPREHEN METABOLIC PANEL: CPT

## 2022-12-21 PROCEDURE — 25000003 PHARM REV CODE 250: Performed by: INTERNAL MEDICINE

## 2022-12-21 PROCEDURE — 88307 TISSUE EXAM BY PATHOLOGIST: CPT | Performed by: SURGERY

## 2022-12-21 PROCEDURE — 25000003 PHARM REV CODE 250: Performed by: SURGERY

## 2022-12-21 PROCEDURE — 63600175 PHARM REV CODE 636 W HCPCS: Performed by: NURSE ANESTHETIST, CERTIFIED REGISTERED

## 2022-12-21 PROCEDURE — 71000033 HC RECOVERY, INTIAL HOUR: Performed by: SURGERY

## 2022-12-21 PROCEDURE — 36000711: Performed by: SURGERY

## 2022-12-21 PROCEDURE — 63600175 PHARM REV CODE 636 W HCPCS: Performed by: INTERNAL MEDICINE

## 2022-12-21 PROCEDURE — 86900 BLOOD TYPING SEROLOGIC ABO: CPT | Performed by: STUDENT IN AN ORGANIZED HEALTH CARE EDUCATION/TRAINING PROGRAM

## 2022-12-21 PROCEDURE — 36415 COLL VENOUS BLD VENIPUNCTURE: CPT | Performed by: STUDENT IN AN ORGANIZED HEALTH CARE EDUCATION/TRAINING PROGRAM

## 2022-12-21 PROCEDURE — 36415 COLL VENOUS BLD VENIPUNCTURE: CPT

## 2022-12-21 PROCEDURE — 27201423 OPTIME MED/SURG SUP & DEVICES STERILE SUPPLY: Performed by: SURGERY

## 2022-12-21 PROCEDURE — 94640 AIRWAY INHALATION TREATMENT: CPT

## 2022-12-21 PROCEDURE — 25000242 PHARM REV CODE 250 ALT 637 W/ HCPCS

## 2022-12-21 PROCEDURE — 11000001 HC ACUTE MED/SURG PRIVATE ROOM

## 2022-12-21 PROCEDURE — 25000003 PHARM REV CODE 250: Performed by: NURSE ANESTHETIST, CERTIFIED REGISTERED

## 2022-12-21 PROCEDURE — 63600175 PHARM REV CODE 636 W HCPCS: Performed by: STUDENT IN AN ORGANIZED HEALTH CARE EDUCATION/TRAINING PROGRAM

## 2022-12-21 PROCEDURE — 63600175 PHARM REV CODE 636 W HCPCS

## 2022-12-21 PROCEDURE — 37000008 HC ANESTHESIA 1ST 15 MINUTES: Performed by: SURGERY

## 2022-12-21 PROCEDURE — 85025 COMPLETE CBC W/AUTO DIFF WBC: CPT

## 2022-12-21 PROCEDURE — 25000003 PHARM REV CODE 250

## 2022-12-21 PROCEDURE — 36000710: Performed by: SURGERY

## 2022-12-21 PROCEDURE — 63600175 PHARM REV CODE 636 W HCPCS: Performed by: ANESTHESIOLOGY

## 2022-12-21 PROCEDURE — 37000009 HC ANESTHESIA EA ADD 15 MINS: Performed by: SURGERY

## 2022-12-21 RX ORDER — MORPHINE SULFATE 10 MG/ML
INJECTION INTRAMUSCULAR; INTRAVENOUS; SUBCUTANEOUS
Status: DISPENSED
Start: 2022-12-21 | End: 2022-12-22

## 2022-12-21 RX ORDER — MEPERIDINE HYDROCHLORIDE 25 MG/ML
12.5 INJECTION INTRAMUSCULAR; INTRAVENOUS; SUBCUTANEOUS EVERY 10 MIN PRN
Status: DISCONTINUED | OUTPATIENT
Start: 2022-12-21 | End: 2022-12-21

## 2022-12-21 RX ORDER — SODIUM CHLORIDE 0.9 % (FLUSH) 0.9 %
10 SYRINGE (ML) INJECTION
Status: DISCONTINUED | OUTPATIENT
Start: 2022-12-21 | End: 2022-12-30 | Stop reason: HOSPADM

## 2022-12-21 RX ORDER — PROPOFOL 10 MG/ML
VIAL (ML) INTRAVENOUS
Status: DISCONTINUED | OUTPATIENT
Start: 2022-12-21 | End: 2022-12-21

## 2022-12-21 RX ORDER — MIDAZOLAM HYDROCHLORIDE 1 MG/ML
2 INJECTION INTRAMUSCULAR; INTRAVENOUS ONCE AS NEEDED
Status: COMPLETED | OUTPATIENT
Start: 2022-12-21 | End: 2022-12-21

## 2022-12-21 RX ORDER — KETOROLAC TROMETHAMINE 30 MG/ML
INJECTION, SOLUTION INTRAMUSCULAR; INTRAVENOUS
Status: DISCONTINUED | OUTPATIENT
Start: 2022-12-21 | End: 2022-12-21

## 2022-12-21 RX ORDER — EPHEDRINE SULFATE 50 MG/ML
INJECTION, SOLUTION INTRAVENOUS
Status: DISCONTINUED | OUTPATIENT
Start: 2022-12-21 | End: 2022-12-21

## 2022-12-21 RX ORDER — ONDANSETRON HYDROCHLORIDE 2 MG/ML
INJECTION, SOLUTION INTRAMUSCULAR; INTRAVENOUS
Status: DISCONTINUED | OUTPATIENT
Start: 2022-12-21 | End: 2022-12-21

## 2022-12-21 RX ORDER — SODIUM CHLORIDE 0.9 G/100ML
IRRIGANT IRRIGATION
Status: DISCONTINUED | OUTPATIENT
Start: 2022-12-21 | End: 2022-12-21 | Stop reason: HOSPADM

## 2022-12-21 RX ORDER — CEFAZOLIN SODIUM 1 G/3ML
INJECTION, POWDER, FOR SOLUTION INTRAMUSCULAR; INTRAVENOUS
Status: DISCONTINUED | OUTPATIENT
Start: 2022-12-21 | End: 2022-12-21

## 2022-12-21 RX ORDER — SODIUM CHLORIDE, SODIUM LACTATE, POTASSIUM CHLORIDE, CALCIUM CHLORIDE 600; 310; 30; 20 MG/100ML; MG/100ML; MG/100ML; MG/100ML
INJECTION, SOLUTION INTRAVENOUS CONTINUOUS
Status: DISCONTINUED | OUTPATIENT
Start: 2022-12-21 | End: 2022-12-21

## 2022-12-21 RX ORDER — HYDROMORPHONE HYDROCHLORIDE 2 MG/ML
INJECTION, SOLUTION INTRAMUSCULAR; INTRAVENOUS; SUBCUTANEOUS
Status: DISCONTINUED | OUTPATIENT
Start: 2022-12-21 | End: 2022-12-21

## 2022-12-21 RX ORDER — LIDOCAINE HCL/PF 100 MG/5ML
SYRINGE (ML) INTRAVENOUS
Status: DISCONTINUED | OUTPATIENT
Start: 2022-12-21 | End: 2022-12-21

## 2022-12-21 RX ORDER — ONDANSETRON 2 MG/ML
4 INJECTION INTRAMUSCULAR; INTRAVENOUS DAILY PRN
Status: DISCONTINUED | OUTPATIENT
Start: 2022-12-21 | End: 2022-12-21

## 2022-12-21 RX ORDER — CEFAZOLIN SODIUM 1 G/3ML
2 INJECTION, POWDER, FOR SOLUTION INTRAMUSCULAR; INTRAVENOUS ONCE
Status: DISCONTINUED | OUTPATIENT
Start: 2022-12-21 | End: 2022-12-30 | Stop reason: HOSPADM

## 2022-12-21 RX ORDER — MORPHINE SULFATE 2 MG/ML
2 INJECTION, SOLUTION INTRAMUSCULAR; INTRAVENOUS
Status: DISCONTINUED | OUTPATIENT
Start: 2022-12-21 | End: 2022-12-30 | Stop reason: HOSPADM

## 2022-12-21 RX ORDER — PHENYLEPHRINE HYDROCHLORIDE 10 MG/ML
INJECTION INTRAVENOUS
Status: DISCONTINUED | OUTPATIENT
Start: 2022-12-21 | End: 2022-12-21

## 2022-12-21 RX ORDER — MIDAZOLAM HYDROCHLORIDE 1 MG/ML
INJECTION INTRAMUSCULAR; INTRAVENOUS
Status: DISPENSED
Start: 2022-12-21 | End: 2022-12-21

## 2022-12-21 RX ORDER — SODIUM CHLORIDE, SODIUM LACTATE, POTASSIUM CHLORIDE, CALCIUM CHLORIDE 600; 310; 30; 20 MG/100ML; MG/100ML; MG/100ML; MG/100ML
INJECTION, SOLUTION INTRAVENOUS CONTINUOUS PRN
Status: DISCONTINUED | OUTPATIENT
Start: 2022-12-21 | End: 2022-12-21

## 2022-12-21 RX ORDER — MORPHINE SULFATE 2 MG/ML
2 INJECTION, SOLUTION INTRAMUSCULAR; INTRAVENOUS EVERY 5 MIN PRN
Status: DISCONTINUED | OUTPATIENT
Start: 2022-12-21 | End: 2022-12-21

## 2022-12-21 RX ORDER — OXYCODONE HYDROCHLORIDE 5 MG/1
5 TABLET ORAL
Status: DISCONTINUED | OUTPATIENT
Start: 2022-12-21 | End: 2022-12-21

## 2022-12-21 RX ORDER — DEXAMETHASONE SODIUM PHOSPHATE 4 MG/ML
INJECTION, SOLUTION INTRA-ARTICULAR; INTRALESIONAL; INTRAMUSCULAR; INTRAVENOUS; SOFT TISSUE
Status: DISCONTINUED | OUTPATIENT
Start: 2022-12-21 | End: 2022-12-21

## 2022-12-21 RX ADMIN — PROPOFOL 150 MG: 10 INJECTION, EMULSION INTRAVENOUS at 11:12

## 2022-12-21 RX ADMIN — IPRATROPIUM BROMIDE AND ALBUTEROL SULFATE 3 ML: 2.5; .5 SOLUTION RESPIRATORY (INHALATION) at 07:12

## 2022-12-21 RX ADMIN — VANCOMYCIN HYDROCHLORIDE 1000 MG: 500 INJECTION, POWDER, LYOPHILIZED, FOR SOLUTION INTRAVENOUS at 04:12

## 2022-12-21 RX ADMIN — LIDOCAINE HYDROCHLORIDE 100 MG: 20 INJECTION INTRAVENOUS at 11:12

## 2022-12-21 RX ADMIN — ENOXAPARIN SODIUM 40 MG: 40 INJECTION SUBCUTANEOUS at 04:12

## 2022-12-21 RX ADMIN — HYDROMORPHONE HYDROCHLORIDE 0.4 MG: 2 INJECTION, SOLUTION INTRAMUSCULAR; INTRAVENOUS; SUBCUTANEOUS at 12:12

## 2022-12-21 RX ADMIN — GABAPENTIN 300 MG: 300 CAPSULE ORAL at 08:12

## 2022-12-21 RX ADMIN — MORPHINE SULFATE 2 MG: 2 INJECTION, SOLUTION INTRAMUSCULAR; INTRAVENOUS at 05:12

## 2022-12-21 RX ADMIN — HYDROMORPHONE HYDROCHLORIDE 0.4 MG: 2 INJECTION, SOLUTION INTRAMUSCULAR; INTRAVENOUS; SUBCUTANEOUS at 11:12

## 2022-12-21 RX ADMIN — PIPERACILLIN AND TAZOBACTAM 4.5 G: 4; .5 INJECTION, POWDER, LYOPHILIZED, FOR SOLUTION INTRAVENOUS; PARENTERAL at 03:12

## 2022-12-21 RX ADMIN — ATORVASTATIN CALCIUM 40 MG: 20 TABLET, FILM COATED ORAL at 08:12

## 2022-12-21 RX ADMIN — MICONAZOLE NITRATE: 20 CREAM TOPICAL at 08:12

## 2022-12-21 RX ADMIN — PHENYLEPHRINE HYDROCHLORIDE 200 MCG: 10 INJECTION INTRAVENOUS at 01:12

## 2022-12-21 RX ADMIN — PHENYLEPHRINE HYDROCHLORIDE 100 MCG: 10 INJECTION INTRAVENOUS at 12:12

## 2022-12-21 RX ADMIN — CEFAZOLIN 2 G: 330 INJECTION, POWDER, FOR SOLUTION INTRAMUSCULAR; INTRAVENOUS at 11:12

## 2022-12-21 RX ADMIN — MORPHINE SULFATE 2 MG: 2 INJECTION, SOLUTION INTRAMUSCULAR; INTRAVENOUS at 07:12

## 2022-12-21 RX ADMIN — DIVALPROEX SODIUM 500 MG: 500 TABLET, FILM COATED, EXTENDED RELEASE ORAL at 08:12

## 2022-12-21 RX ADMIN — HYDROMORPHONE HYDROCHLORIDE 0.6 MG: 2 INJECTION, SOLUTION INTRAMUSCULAR; INTRAVENOUS; SUBCUTANEOUS at 11:12

## 2022-12-21 RX ADMIN — KETOROLAC TROMETHAMINE 30 MG: 30 INJECTION, SOLUTION INTRAMUSCULAR; INTRAVENOUS at 01:12

## 2022-12-21 RX ADMIN — DEXAMETHASONE SODIUM PHOSPHATE 8 MG: 4 INJECTION, SOLUTION INTRA-ARTICULAR; INTRALESIONAL; INTRAMUSCULAR; INTRAVENOUS; SOFT TISSUE at 11:12

## 2022-12-21 RX ADMIN — INSULIN ASPART 5 UNITS: 100 INJECTION, SOLUTION INTRAVENOUS; SUBCUTANEOUS at 08:12

## 2022-12-21 RX ADMIN — EPHEDRINE SULFATE 25 MG: 50 INJECTION INTRAVENOUS at 01:12

## 2022-12-21 RX ADMIN — HYDROCODONE BITARTRATE AND ACETAMINOPHEN 1 TABLET: 5; 325 TABLET ORAL at 04:12

## 2022-12-21 RX ADMIN — INSULIN DETEMIR 20 UNITS: 100 INJECTION, SOLUTION SUBCUTANEOUS at 08:12

## 2022-12-21 RX ADMIN — GABAPENTIN 300 MG: 300 CAPSULE ORAL at 04:12

## 2022-12-21 RX ADMIN — HYDROCODONE BITARTRATE AND ACETAMINOPHEN 1 TABLET: 5; 325 TABLET ORAL at 11:12

## 2022-12-21 RX ADMIN — SODIUM CHLORIDE: 9 INJECTION, SOLUTION INTRAVENOUS at 09:12

## 2022-12-21 RX ADMIN — EPHEDRINE SULFATE 25 MG: 50 INJECTION INTRAVENOUS at 11:12

## 2022-12-21 RX ADMIN — MORPHINE SULFATE 2 MG: 2 INJECTION, SOLUTION INTRAMUSCULAR; INTRAVENOUS at 01:12

## 2022-12-21 RX ADMIN — PHENYLEPHRINE HYDROCHLORIDE 100 MCG: 10 INJECTION INTRAVENOUS at 01:12

## 2022-12-21 RX ADMIN — ONDANSETRON 4 MG: 2 INJECTION INTRAMUSCULAR; INTRAVENOUS at 12:12

## 2022-12-21 RX ADMIN — SODIUM CHLORIDE, POTASSIUM CHLORIDE, SODIUM LACTATE AND CALCIUM CHLORIDE: 600; 310; 30; 20 INJECTION, SOLUTION INTRAVENOUS at 10:12

## 2022-12-21 RX ADMIN — HYDROMORPHONE HYDROCHLORIDE 0.2 MG: 2 INJECTION, SOLUTION INTRAMUSCULAR; INTRAVENOUS; SUBCUTANEOUS at 01:12

## 2022-12-21 RX ADMIN — ASPIRIN 81 MG CHEWABLE TABLET 81 MG: 81 TABLET CHEWABLE at 08:12

## 2022-12-21 RX ADMIN — VANCOMYCIN HYDROCHLORIDE 1000 MG: 500 INJECTION, POWDER, LYOPHILIZED, FOR SOLUTION INTRAVENOUS at 03:12

## 2022-12-21 RX ADMIN — PIPERACILLIN AND TAZOBACTAM 4.5 G: 4; .5 INJECTION, POWDER, LYOPHILIZED, FOR SOLUTION INTRAVENOUS; PARENTERAL at 08:12

## 2022-12-21 RX ADMIN — MIDAZOLAM HYDROCHLORIDE 2 MG: 1 INJECTION, SOLUTION INTRAMUSCULAR; INTRAVENOUS at 10:12

## 2022-12-21 RX ADMIN — PHENYLEPHRINE HYDROCHLORIDE 100 MCG: 10 INJECTION INTRAVENOUS at 11:12

## 2022-12-21 NOTE — ANESTHESIA PREPROCEDURE EVALUATION
12/21/2022  Con Arnold is a 62 y.o., male with PMHx of HTN, HLD, COPD, DM presents for Lt BKA secondary to Lt LE osteomyelitis.    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      Review of Systems  Anesthesia Hx:  No problems with previous Anesthesia    Social:  Former Smoker    Cardiovascular:   Hypertension, well controlled hyperlipidemia    Pulmonary:   COPD, mild    Renal/:  Renal/ Normal     Hepatic/GI:  Hepatic/GI Normal    Neurological:  Neurology Normal    Endocrine:   Diabetes, poorly controlled, type 2      Vitals:    12/21/22 0454 12/21/22 0712 12/21/22 0719 12/21/22 0738   BP: 122/74   (!) 158/80   Pulse: 72 98 98 80   Resp:  18 18 18   Temp: 36.8 °C (98.3 °F)   36.8 °C (98.3 °F)   TempSrc: Oral   Oral   SpO2: 95% (!) 94% (!) 94% 95%   Weight:       Height:             Physical Exam  General: Alert, Cooperative and Well nourished    Airway:  Mallampati: II   Mouth Opening: Normal  TM Distance: Normal  Tongue: Normal  Neck ROM: Normal ROM    Dental:  Edentulous    Chest/Lungs:  Clear to auscultation, Normal Respiratory Rate    Heart:  Rate: Normal  Rhythm: Regular Rhythm  Sounds: Normal      Lab Results   Component Value Date    WBC 5.4 12/21/2022    HGB 15.6 12/21/2022    HCT 44.4 12/21/2022     MCV 94.1 (H) 12/21/2022     (L) 12/21/2022       CMP  Sodium Level   Date Value Ref Range Status   12/21/2022 137 136 - 145 mmol/L Final     Potassium Level   Date Value Ref Range Status   12/21/2022 4.1 3.5 - 5.1 mmol/L Final     Carbon Dioxide   Date Value Ref Range Status   12/21/2022 23 23 - 31 mmol/L Final     Blood Urea Nitrogen   Date Value Ref Range Status   12/21/2022 12.9 8.4 - 25.7 mg/dL Final     Creatinine   Date Value Ref Range Status   12/21/2022 0.81 0.73 - 1.18 mg/dL Final     Calcium Level Total   Date Value Ref Range Status   12/21/2022 9.4 8.8 - 10.0 mg/dL Final     Albumin Level   Date Value Ref Range Status   12/21/2022 2.6 (L) 3.4 - 4.8 g/dL Final     Bilirubin Total   Date Value Ref Range Status   12/21/2022 0.5 <=1.5 mg/dL Final     Alkaline Phosphatase   Date Value Ref Range Status   12/21/2022 77 40 - 150 unit/L Final     Aspartate Aminotransferase   Date Value Ref Range Status   12/21/2022 37 (H) 5 - 34 unit/L Final     Alanine Aminotransferase   Date Value Ref Range Status   12/21/2022 25 0 - 55 unit/L Final     eGFR   Date Value Ref Range Status   12/21/2022 >60 mls/min/1.73/m2 Final     Lab Results   Component Value Date    HGBA1C 11.3 (H) 12/16/2022           Anesthesia Plan  Type of Anesthesia, risks & benefits discussed:    Anesthesia Type: Gen Supraglottic Airway  Intra-op Monitoring Plan: Standard ASA Monitors  Post Op Pain Control Plan: IV/PO Opioids PRN  Induction:  IV  Airway Plan: Direct  ASA Score: 3  Day of Surgery Review of History & Physical: H&P Update referred to the surgeon/provider.    Ready For Surgery From Anesthesia Perspective.     .

## 2022-12-21 NOTE — PROGRESS NOTES
OT orders cancelled d/t pt off unit for L BKA; pt will need new OT orders as appropriate for eval and treat d/t sx.

## 2022-12-21 NOTE — ANESTHESIA PROCEDURE NOTES
Intubation    Date/Time: 12/21/2022 11:02 AM  Performed by: Abilio Sy CRNA  Authorized by: Anat Brantley MD     Intubation:     Induction:  Intravenous    Intubated:  Postinduction    Mask Ventilation:  Easy mask    Attempts:  1    Attempted By:  CRNA and student    Difficult Airway Encountered?: No      Complications:  None    Airway Device:  Supraglottic airway/LMA (IGEL)    Airway Device Size:  4.0    Style/Cuff Inflation:  Uncuffed    Placement Verified By:  Capnometry    Complicating Factors:  None    Findings Post-Intubation:  BS equal bilateral and atraumatic/condition of teeth unchanged

## 2022-12-21 NOTE — TRANSFER OF CARE
Anesthesia Transfer of Care Note    Patient: Con Arnold    Procedure(s) Performed: Procedure(s) (LRB):  AMPUTATION, BELOW KNEE (Left)    Patient location: PACU    Anesthesia Type: general    Transport from OR: Transported from OR on room air with adequate spontaneous ventilation    Post pain: adequate analgesia    Post assessment: no apparent anesthetic complications    Post vital signs: stable    Level of consciousness: responds to stimulation and sedated    Nausea/Vomiting: no nausea/vomiting    Complications: none    Transfer of care protocol was followed      Last vitals:   Visit Vitals  BP (!) 152/70   Pulse 88   Temp 36 °C (96.8 °F) (Temporal)   Resp 20   Ht 6' (1.829 m)   Wt 94.8 kg (209 lb)   SpO2 98%   BMI 28.35 kg/m²

## 2022-12-21 NOTE — NURSING
"Pt called to say he need his nurse, he continued with, "  look its's staff in this IV , I told you it was going to do this,  you should never put an IV in the wrist , go away I don't want you to do anything for me, and you can't take this out , I want to see the charge nurse and the Dr". I informed Julisa RN , and paged the Dr.  Upon observation I noticed that the IV was   Infiltrated .  "

## 2022-12-21 NOTE — OP NOTE
[facility] Med Surg Telemetry  Operative Note    SUMMARY     Surgery Date: 12/21/2022     Surgeon(s) and Role:     * William Aguayo MD - Primary     * Vamshi Beck MD - Resident - Assisting        Pre-op Diagnosis:  Post-Op Diagnosis Codes:     * Diabetic ulcer of foot associated with diabetes mellitus due to underlying condition, limited to breakdown of skin, unspecified laterality, unspecified part of foot [E08.621, L97.501]    Post-op Diagnosis:  Post-Op Diagnosis Codes:     * Diabetic ulcer of foot associated with diabetes mellitus due to underlying condition, limited to breakdown of skin, unspecified laterality, unspecified part of foot [E08.621, L97.501]    Procedure(s) (LRB):  AMPUTATION, BELOW KNEE (Left)    Anesthesia: General    Operative Findings:   Informed consent was obtained prior to the procedure. All risks, benefits, alternatives were explained in detail to the patient. The patient was wheeled into the operating theatre. Anesthesia induced the patient and performed endotracheal intubation. The patient was placed in the supine position. The left leg was prepped and draped in the standard fashion. A formal timeout was held confirming correct patient, procedure, site, preoperative antibiotics, VTE prophylaxis, and all operating room staff.      A 10-blade was used to make an incision 8 cm below the tibial tuberosity which was extended medially and laterally. Bovie electrocautery was used to dissect dermis, fascia, and muscle up to the tibia.  A periosteal elevator was used to circumferentially dissect the tibia proximally about 2 inches. A gigli saw was used to transect the tibia at this point. Bleeding from the posterior tibial artery was controlled with a 2-0 silk stick tie. The skin and subcutaneous tissue were transected and removed from the field. The posterior tibial artery and vein were then isolated and stick tied with 2-0 silk ties. A periosteal elevator was then  used to lift all muscle off the fibula which was then transected approximately 2 inches proximal using a bone cutter. At this point, the investing fascia of the gastrocneumius was re-approximated to the investing fascia of the tibialis anterior using 0 silk simple interrupted stitches along the length of the incision. Deep dermal stitches were used to re-approximate the dermis along the length of the incision using 0 vicryl simple interrupted stitches. 2-0 nylon vertical mattress stitches were used to offload pressure at the incision. The incision was then stapled to re-approximate the skin. Xeroform, ABD pads, kerlix, and ace was used to dress the wound.     At the conclusion of the procedure all counts were correct x 2. The patient tolerated the procedure well, was extubated and transferred to PACU in stable condition.    Estimated Blood Loss: * No values recorded between 12/21/2022 11:19 AM and 12/21/2022  1:41 PM *    Estimated Blood Loss has not been documented. EBL = 20cc.         Specimens:   Specimen (24h ago, onward)       Start     Ordered    12/21/22 1213  Specimen to Pathology  RELEASE UPON ORDERING        References:    Click here for ordering Quick Tip   Question:  Release to patient  Answer:  Immediate    12/21/22 1213                    [id number]    Vamshi Beck MD  12/21/2022 1:51 PM

## 2022-12-21 NOTE — NURSING
Pt asked that I start  him another IV, he apologized for his behavior, and I started another IV to his left hand, a 20 G, one attempt,  He then  asked me to remove the swollen on in his right wrist,  I did. Minimal swelling with no drainage or redness noted,

## 2022-12-21 NOTE — PLAN OF CARE
Problem: Adult Inpatient Plan of Care  Goal: Plan of Care Review  Outcome: Ongoing, Progressing  Goal: Patient-Specific Goal (Individualized)  Outcome: Ongoing, Progressing  Goal: Absence of Hospital-Acquired Illness or Injury  Outcome: Ongoing, Progressing  Goal: Optimal Comfort and Wellbeing  Outcome: Ongoing, Progressing  Goal: Readiness for Transition of Care  Outcome: Ongoing, Progressing     Problem: Impaired Wound Healing  Goal: Optimal Wound Healing  Outcome: Ongoing, Progressing     Problem: Infection  Goal: Absence of Infection Signs and Symptoms  Outcome: Ongoing, Progressing     Problem: Electrolyte Imbalance  Goal: Electrolyte Balance  Outcome: Ongoing, Progressing     Problem: Diabetes Comorbidity  Goal: Blood Glucose Level Within Targeted Range  Outcome: Ongoing, Progressing     Problem: Skin Injury Risk Increased  Goal: Skin Health and Integrity  Outcome: Ongoing, Progressing     Problem: Fall Injury Risk  Goal: Absence of Fall and Fall-Related Injury  Outcome: Ongoing, Progressing

## 2022-12-21 NOTE — PROGRESS NOTES
[facility] Medicine Wards   Progress Note     Resident Team: [facility] Medicine List 1  Attending Physician: Evie Burger MD  Resident: Rl Kang MD  Intern: Don Best MD     Hospital Length of Stay: 4 days    Subjective:      Brief HPI:  Con Arnold is a 62 y.o. male who with a history of chronic osteomyelitis of the foot bilaterally on suppresive doxycycline, htn, uncontrolled DMII, who presented to [facility] ED on 12/16/2022  with complaint of bilateral foot pain, ulcers.     Patient has hx of osteomyelitis of the foot with numerous admissions for antibiotic treatment. Patients was followed in ID and was on suppressive doxycycline to which he has not taken in 2 months. He states he has run out of supplies for cleaning his foot ulcers, and states he is homeless. Patient was admitted in [place] where he received 1 month of antibiotics in July, and then another month in august in [place] where he had a toe and metatarsals removed from his right foot. He states that it helped however he has recently moved back. His feet are worse over the past month, he has pain though still is able to ambulate. He is with his son and they are trying to get housing although they have been running into difficulties. Patient sought consult due to unbearable pain, will admit for abx, wound assessment and treatment, and blood sugar control.     Of note he has not had insulin in over a year.    Interval History:NAERON. VSS. Issue with IV last night causing R hand pain and swelling; IV was removed and placed in L hand. Otherwise is feeling fine. Has been NPO since midnight for planned BKA today. Denies any shortness of breath, chest pain, palpitations or worsening leg pain.      Review of Systems   Constitutional:  Negative for chills, fever and weight loss.   Respiratory:  Negative for cough and wheezing.    Cardiovascular:  Negative for chest pain and palpitations.   Gastrointestinal:  Negative for abdominal pain, nausea and vomiting.    Musculoskeletal:  Negative for myalgias.        Foot pain bilaterally   Neurological:  Negative for focal weakness and headaches.        Objective:     Vital Signs (Most Recent):  Temp: 98.3 °F (36.8 °C) (12/21/22 0454)  Pulse: 98 (12/21/22 0712)  Resp: 18 (12/21/22 0712)  BP: 122/74 (12/21/22 0454)  SpO2: (!) 94 % (12/21/22 0712)   Vital Signs (24h Range):  Temp:  [97.6 °F (36.4 °C)-98.7 °F (37.1 °C)] 98.3 °F (36.8 °C)  Pulse:  [] 98  Resp:  [18-20] 18  SpO2:  [91 %-98 %] 94 %  BP: (122-168)/(56-90) 122/74     Physical Examination:  Constitutional:       General: He is not in acute distress.     Appearance: Normal appearance. He is not ill-appearing.   HENT:      Head: Normocephalic and atraumatic.      Nose: Nose normal.   Eyes:      Extraocular Movements: Extraocular movements intact.      Conjunctiva/sclera: Conjunctivae normal.      Pupils: Pupils are equal, round, and reactive to light.   Cardiovascular:      Rate and Rhythm: Normal rate and regular rhythm.   Pulmonary:      Effort: Pulmonary effort is normal. No respiratory distress.      Breath sounds: CTAB     Comments: Breath sounds distant, no wheezing  Abdominal:      General: Abdomen is flat.      Palpations: Abdomen is soft.   Musculoskeletal:         General: Deformity (hammer toes bilaterally) and signs of injury (Bilateral ulcer plantar surface of foot, left larger than right, no discharges) present. Normal range of motion.   Neurological:      Mental Status: He is alert.      Sensory: Sensory deficit present.   Psychiatric:         Mood and Affect: Mood normal.         Behavior: Behavior normal.      Laboratory:  Most Recent Data:  CBC:   Recent Labs   Lab 12/20/22  0420 12/21/22  0358   WBC 3.9* 5.4   HGB 14.0 15.6   HCT 40.3* 44.4   * 139*     CMP:   Recent Labs   Lab 12/21/22  0358   CALCIUM 9.4   ALBUMIN 2.6*      K 4.1   CO2 23   BUN 12.9   CREATININE 0.81   ALKPHOS 77   ALT 25   AST 37*   BILITOT 0.5        Radiology:  Imaging Results              CT Foot W W/O Contrast Bilateral (Final result)  Result time 12/17/22 10:04:45      Final result by Ayse Martinez MD (12/17/22 10:04:45)                   Impression:      1. No definite acute fracture or acute erosive bone changes.  2. Chronic appearing changes at the left 2nd MTP joint.  3. Bilateral plantar surface soft tissue ulcerations and blisters.  Otherwise no deep drainable fluid collection.  4. 3 mm radiopaque foreign body in the plantar surface underlying the right 1st metatarsal head.      Electronically signed by: Ayse Matrinez  Date:    12/17/2022  Time:    10:04               Narrative:    EXAMINATION:  CT FOOT W W/O CONTRAST BILATERAL    CLINICAL HISTORY:  Foot pain, chronic, osseous injury suspected;Diabetic Foot, r/o osteo;    TECHNIQUE:  CT images of the bilateral feet with and without contrast.  Axial, coronal, and sagittal reformatted images were obtained. Dose length product is 143 mGycm. Automatic exposure control, adjustment of mA/kV or iterative reconstruction technique was used to limit radiation dose.    COMPARISON:  X-rays dated 12/16/2022    FINDINGS:  Right foot:    There is no acute fracture identified.  There are no definite acute erosive bone changes.    There is a soft tissue ulceration along the plantar surface underlying the 3rd metatarsal head, with blister at the skin surface measuring 5 x 23 mm in size (series 14, image 32).  There is otherwise no drainable fluid collection.  There is a 3 mm radiopaque foreign body in the plantar surface underlying the 1st metatarsal head.    Left foot:    There is no definite acute fracture identified.  There is dorsal subluxation at the 2nd and 3rd MTP joints.  There are chronic appearing changes at the 2nd MTP joint.  There are no definite acute erosive changes identified.    There is plantar soft tissue ulceration with blister at the skin surface underlying the 2nd and 3rd metatarsal  heads.  There is otherwise no drainable fluid collection identified.  There is no radiopaque foreign body.                                       X-Ray Foot Complete Right (Final result)  Result time 12/17/22 10:43:42      Final result by Yoly Mendez MD (12/17/22 10:43:42)                   Impression:      No change since prior      Electronically signed by: Yoly Mendez  Date:    12/17/2022  Time:    10:43               Narrative:    EXAMINATION:  XR FOOT COMPLETE 3 VIEW RIGHT    CLINICAL HISTORY:  . Diabetes mellitus due to underlying condition with foot ulcer    TECHNIQUE:  AP, lateral, and oblique views of the right foot were performed.    COMPARISON:  11/11/2022    FINDINGS:  Patient is status post amputation of the distal 2nd metatarsal.  There is a radiopaque foreign body seen at the base of the 1st toe.  It is unchanged since prior examination.  No fracture seen.  No dislocation is seen.                                       X-Ray Foot Complete Left (Final result)  Result time 12/17/22 10:36:22      Final result by Audi Giles MD (12/17/22 10:36:22)                   Impression:      Degenerative changes.    Dislocations slight subluxation of the 2nd and 3rd metatarsophalangeal joints.    Soft tissue ulceration.    No clear evidence of osteomyelitis.      Electronically signed by: Audi Gilse  Date:    12/17/2022  Time:    10:36               Narrative:    EXAMINATION:  XR FOOT COMPLETE 3 VIEW LEFT    CLINICAL HISTORY:  Diabetes mellitus due to underlying condition with foot ulcer    COMPARISON:  None.    FINDINGS:  No acute displaced fractures or dislocations.    There are some degenerative changes of the proximal and distal interphalangeal joints with a slight hilus valgus deformity    There are persistent dislocations/subluxations involving the 2nd and 3rd metatarsophalangeal joints which appears to be unchanged as compared with the previous exam    There might be soft tissue  disruption indicating the presence of an ulceration, however, on the provided images there is no evidence of primary or secondary signs to suggest the presence of osteomyelitis other imaging modalities might prove helpful for further assessment                                       X-Ray Chest PA And Lateral (Final result)  Result time 12/17/22 10:28:04      Final result by Audi Giles MD (12/17/22 10:28:04)                   Impression:      No acute chest disease is identified.      Electronically signed by: Audi Giles  Date:    12/17/2022  Time:    10:28               Narrative:    EXAMINATION:  XR CHEST PA AND LATERAL    CLINICAL HISTORY:  , Cough, unspecified.    COMPARISON:  October 26, 2021    FINDINGS:  No alveolar consolidation, effusion, or pneumothorax is seen.   The thoracic aorta is normal  cardiac silhouette, central pulmonary vessels and mediastinum are normal in size and are grossly unremarkable.   visualized osseous structures are grossly unremarkable.                                      MRI Foot (Forefoot) Left W W/O Contrast 12/19/2022    Narrative  EXAMINATION:  MRI FOOT (FOREFOOT) LEFT W W/O CONTRAST    CLINICAL HISTORY:  Foot swelling, diabetic, osteomyelitis suspected, xray done;    TECHNIQUE:  Enhanced and unenhanced, multiplanar, multisequence MR imaging of the left foot was obtained.    COMPARISON:  MRI contralateral foot used for comparison dated 12/19/2022.  CT scan used for comparison dated 12/17/2022    FINDINGS:  Incongruent hallux valgus deformity.  Trabecular edema present to the distal phalanx of the great toe is equivocal.  Trabecular edema and an effusion present to the 1st metatarsophalangeal joint may represent early osteomyelitis.  Destructive osteomyelitis and septic arthritis are present to the 2nd and 3rd metatarsophalangeal joints with dorsal dislocation of the bases of the proximal phalanges and full-thickness tears of the plantar plates.  The flexor tendon  of the 2nd toe is largely torn.  The 3rd flexor tendon is medially dislocated but appears structurally intact.  An abscess is present about the 3rd metatarsal head and measures 3.5 cm x 2 cm in size.  It likely represents decompressed septic arthritis.    Myositis is present to the anterior intrinsic muscles.  An ulcer is present to the plantar aspect of the forefoot with necrotic tissue present in the ulcer base.  This measures approximately 4.5 cm x 3 cm and is nicely demonstrated on image 19 of series 9.  Suppurative flexor tenosynovitis is present to the midfoot.    Impression  Destructive septic arthritis/osteomyelitis is present to the 2nd and 3rd tarsal metatarsal joints with dorsal dislocation of the bases of the proximal phalanges and full-thickness tearing of the plantar plates.  The 2nd flexor tendon is largely torn.  The 3rd flexor tendon is dislocated medially.  A 3.5 cm x 2 cm abscess is present about the 3rd metatarsal head and likely represents decompressed septic arthritis.    Septic arthritis is likely present to the 1st metatarsal phalangeal joint.  Edema and trace enhancement seen to the distal phalanx of the great toe is equivocal..    Suppurative flexor tenosynovitis.  An ulcer is present to the plantar aspect of the forefoot with 4.5 cm of necrotic tissue present in the ulcer base.  This is nicely demonstrated on image 19 of series 9.    Myositis to the anterior intrinsic muscles.  Cellulitis is present to the forefoot.      Electronically signed by: Kenneth Madsen  Date:    12/19/2022  Time:    17:16    Current Medications:     Infusions:   sodium chloride 0.9% 125 mL/hr at 12/20/22 2114        Scheduled:   aspirin  81 mg Oral Daily    atorvastatin  40 mg Oral Daily    collagenase   Topical (Top) Daily    divalproex  500 mg Oral Daily    enoxaparin  40 mg Subcutaneous Daily    fluticasone furoate-vilanteroL  1 puff Inhalation Daily    gabapentin  300 mg Oral TID    insulin detemir U-100  20  Units Subcutaneous QHS    insulin detemir U-100  20 Units Subcutaneous Daily    miconazole   Topical (Top) BID    piperacillin-tazobactam (ZOSYN) IVPB  4.5 g Intravenous Q8H    vancomycin (VANCOCIN) IVPB  1,000 mg Intravenous Q12H        PRN:  albuterol-ipratropium, dextrose 10%, dextrose 10%, glucagon (human recombinant), glucose, glucose, HYDROcodone-acetaminophen, insulin aspart U-100, naloxone, sodium chloride 0.9%, Pharmacy to dose Vancomycin consult **AND** vancomycin - pharmacy to dose    Antibiotics and Day Number of Therapy:  Vancomycin and Zosyn - Day 4      Intake/Output Summary (Last 24 hours) at 12/21/2022 0719  Last data filed at 12/21/2022 0531  Gross per 24 hour   Intake 3600 ml   Output 4502 ml   Net -902 ml       Lines/Drains/Airways       Peripheral Intravenous Line  Duration                  Peripheral IV - Double Lumen 12/21/22 0150 20 G Left;Posterior Hand <1 day                  Assessment & Plan:   Diabetic Ulcers, Bilateral   Hx of Osteomyelitis of the Feet, Bilateral  Uncontrolled DMII  - HbA1c 11.3% at this time  - Has not taken insulin in over 1 year  - Worsening ulcers, left worse than right on plantar surfaces   - Strict glucose monitoring  - Started Long acting Levemir 20 units QHS, 15 units am (CBG post prandial at 200s)  - Vancomycin and Zosyn to cover polymicrobial etiologies  - CT foot without obvious osteomyelitis bilaterally  - MRI foot bilateral - Osteomyelitis, septic arthritis in left foot  - Surgery on board; BKA planned for today. Pt has been NPO since Midnight  - PRN Pain medications in place  - Gabapentin 300mg on board for diabetic neuropathy  - Wound care consulted       Mild IMAN likely from glucosuric losses - resolved  - Continue NS  - Renal indices improved      Chronic airway disease, likely COPD no PFTs at this time  Hx of Tobacco Use   - DuoNebs scheduled q6h PRN  - Improving        CODE STATUS: Full Code  Access: Peripheral  Antibiotics: Vanc and Zosyn Day  4  Diet: Diabetic  DVT Prophylaxis: Lovenox, held last night for planned BKA today  GI Prophylaxis: none needed  Fluids: normal saline 125 ml/hr       Disposition: Day 4 of admission for Bilateral diabetic foot, hx of osteomyelitis,will continue antiobitocs day 4, planned BKA today. Can d/c antibiotics after 3-5 days, continuing to manage sugars    Don Best MD  [employer] [facility] PGY-1

## 2022-12-21 NOTE — ANESTHESIA POSTPROCEDURE EVALUATION
Anesthesia Post Evaluation    Patient: Con Arnold    Procedure(s) Performed: Procedure(s) (LRB):  AMPUTATION, BELOW KNEE (Left)    Final Anesthesia Type: general      Patient location during evaluation: med/surg floor  Post-procedure vital signs: reviewed and stable  Airway patency: patent      Anesthetic complications: no      Cardiovascular status: hemodynamically stable  Respiratory status: spontaneous ventilation  Follow-up not needed.          Vitals Value Taken Time   /71 12/21/22 1359   Temp  12/21/22 1417   Pulse 78 12/21/22 1359   Resp 15 12/21/22 1359   SpO2 93 % 12/21/22 1359         No case tracking events are documented in the log.      Pain/Adalberto Score: Pain Rating Prior to Med Admin: 8 (12/21/2022  1:55 PM)  Adalberto Score: 9 (12/21/2022  1:57 PM)

## 2022-12-21 NOTE — PHYSICIAN QUERY
"PT Name: Con Arnold  MR #: [id number]    DOCUMENTATION CLARIFICATION     CDS/: Araceli Perry RN           Contact information: [email or web address]   This form is a permanent document in the medical record.    Query Date: December 21, 2022  By submitting this query, we are merely seeking further clarification of documentation. Please utilize your independent clinical judgment when addressing the question(s) below.    The Medical Record contains the following:   Indicator Supporting Clinical Findings Location in Medical Record   x Documentation of "Debridement" Excisional  debridement  Date/Time: 12/16/2022 9:57 PM  Pre-operative diagnosis: bilateral plantar foot ulcers  Post-operative diagnosis: bilateral plantar foot ulcers  Description of findings: bilateral plantar foot calloused ulcers debrided back to healthy tissue centrally. Some callous remains around periphery of wound   Scalpel size: 11  Comments: Left foot debridement of callous and debridement of central wound back to healthy bleeding tissue, minimal serous drainage expressed, no purulence, does not probe to bone.  Right foot callous cleaned up callous removed sharply  Recommendations:  No evidence of bony infection via callouses on my debridement.   Santyl daily ordered for bilateral wounds  Wound care consult placed for further recommendations  No obvious OM on CT   Procedure Note of 12/17       Documentation of "I&D"        Other         Excisional debridement is the surgical removal or cutting away of such tissue, necrosis, or slough and is classified to the root operation "Excision." Use of a sharp instrument does not always indicate that an excisional debridement was performed. Minor removal of loose fragments with scissors or using a sharp instrument to scrape away tissue is not an excisional debridement. Excisional debridement involves the use of a scalpel to remove devitalized tissue.  Nonexcisional debridement is the nonoperative " "brushing, irrigating, scrubbing, or washing of devitalized tissue, necrosis, slough, or foreign material. Most nonexcisional debridement procedures are classified to the root operation "Extraction" (pulling or stripping out or off all or a portion of a body part by the use of force).     Provider, please provide further clarification on the procedure performed on Left plantar foot ulcer     [  x ] Excisional Debridement of skin     [ x  ] Excisional Debridement of subcutaneous tissue/fascia     [   ] Excisional Debridement of muscle     [   ] Excisional Debridement of tendon     [   ] Excisional Debridement of bone          Provider, please provide further clarification on the procedure performed on Right planter foot ulcer     [  x ] Excisional Debridement of skin     [ x  ] Excisional Debridement of subcutaneous tissue/fascia     [   ] Excisional Debridement of muscle     [   ] Excisional Debridement of tendon     [   ] Excisional Debridement of bone         [   ] Other Procedure (please specify): _____________     [  ] Clinically Undetermined     Reference:    ICD-10-CM/PCS Coding Clinic Third Quarter ICD-10, Effective with discharges: October 7, 2015 [facility] § Excisional and nonexcisional debridement (2015).    Form No. 55863   "

## 2022-12-21 NOTE — PROGRESS NOTES
GENERAL SURGERY  PROGRESS NOTE    Admit Date: 12/16/2022  Hospital Day: 4      NAEON  AFVSS  MRI with L foot OM  BKA today  Has been NPOmn  No s/s systemic infection  WBC 5.4      Physical Exam:  Gen: NAD  HEENT: anicteric sclera, MMM  Chest: RR, unlabored respirations  Abd: s/nt/nd  Left foot with wound overlying distal middle metarsals with prominent overlying callous and no significant purulence  Right foot with wound overlying distal middle metarsals with prominent overlying callous and no significant purulence, smaller in size than left  (See clinical media)      Assessment:  Con Arnold is a 62 y.o.m with PMH T2DM, HLD, cataracts, chronic osteomylelitis of the bilateral feet, s/p r/s 2nd digit ray amp with bilateral chronic diabetic foot wounds, now w/ c/f L foot OM on MRI       Plan:  - amenable to L BKA  - NPOmn  - OR today for L BKA  - consented, marked      Inpatient Data      Vitals:   Temp:  [97.6 °F (36.4 °C)-98.7 °F (37.1 °C)]   Pulse:  []   Resp:  [18-20]   BP: (122-168)/(56-90)   SpO2:  [91 %-98 %]     Diet NPO   Intake/Output Summary (Last 24 hours) at 12/21/2022 0653  Last data filed at 12/21/2022 0531  Gross per 24 hour   Intake 3600 ml   Output 4502 ml   Net -902 ml          Recent Labs     12/19/22  0434 12/20/22  0420 12/21/22  0358   WBC 4.5 3.9* 5.4   HGB 14.0 14.0 15.6   HCT 40.5* 40.3* 44.4   * 112* 139*    138 137   K 3.8 3.9 4.1   CO2 23 23 23   BUN 13.8 10.9 12.9   CREATININE 0.91 0.90 0.81   BILITOT 0.9 0.4 0.5   AST 17 18 37*   ALT 9 10 25   ALKPHOS 65 65 77   CALCIUM 8.6* 8.8 9.4   ALBUMIN 2.4* 2.4* 2.6*     Scheduled Meds: aspirin, 81 mg, Daily  atorvastatin, 40 mg, Daily  collagenase, , Daily  divalproex, 500 mg, Daily  enoxaparin, 40 mg, Daily  fluticasone furoate-vilanteroL, 1 puff, Daily  gabapentin, 300 mg, TID  insulin detemir U-100, 20 Units, QHS  insulin detemir U-100, 20 Units, Daily  miconazole, , BID  piperacillin-tazobactam (ZOSYN) IVPB, 4.5 g,  Q8H  vancomycin (VANCOCIN) IVPB, 1,000 mg, Q12H       sodium chloride 0.9% 125 mL/hr at 12/20/22 0520

## 2022-12-22 PROBLEM — Z89.512 S/P BKA (BELOW KNEE AMPUTATION) UNILATERAL, LEFT: Status: ACTIVE | Noted: 2022-12-22

## 2022-12-22 PROBLEM — L97.529 TYPE 2 DIABETES MELLITUS WITH LEFT DIABETIC FOOT ULCER: Status: ACTIVE | Noted: 2022-12-22

## 2022-12-22 PROBLEM — E11.65 UNCONTROLLED TYPE 2 DIABETES MELLITUS WITH HYPERGLYCEMIA: Status: ACTIVE | Noted: 2022-12-22

## 2022-12-22 PROBLEM — E11.621 TYPE 2 DIABETES MELLITUS WITH LEFT DIABETIC FOOT ULCER: Status: ACTIVE | Noted: 2022-12-22

## 2022-12-22 LAB
ALBUMIN SERPL-MCNC: 2.9 G/DL (ref 3.4–4.8)
ALBUMIN/GLOB SERPL: 0.7 RATIO (ref 1.1–2)
ALP SERPL-CCNC: 77 UNIT/L (ref 40–150)
ALT SERPL-CCNC: 30 UNIT/L (ref 0–55)
AST SERPL-CCNC: 59 UNIT/L (ref 5–34)
BASOPHILS # BLD AUTO: 0.01 X10(3)/MCL (ref 0–0.2)
BASOPHILS NFR BLD AUTO: 0.1 %
BILIRUBIN DIRECT+TOT PNL SERPL-MCNC: 0.6 MG/DL
BUN SERPL-MCNC: 24 MG/DL (ref 8.4–25.7)
CALCIUM SERPL-MCNC: 9.2 MG/DL (ref 8.8–10)
CHLORIDE SERPL-SCNC: 103 MMOL/L (ref 98–107)
CO2 SERPL-SCNC: 25 MMOL/L (ref 23–31)
CREAT SERPL-MCNC: 1.07 MG/DL (ref 0.73–1.18)
EOSINOPHIL # BLD AUTO: 0 X10(3)/MCL (ref 0–0.9)
EOSINOPHIL NFR BLD AUTO: 0 %
ERYTHROCYTE [DISTWIDTH] IN BLOOD BY AUTOMATED COUNT: 13.2 % (ref 11.6–14.4)
GFR SERPLBLD CREATININE-BSD FMLA CKD-EPI: >60 MLS/MIN/1.73/M2
GLOBULIN SER-MCNC: 4.2 GM/DL (ref 2.4–3.5)
GLUCOSE SERPL-MCNC: 223 MG/DL (ref 82–115)
HCT VFR BLD AUTO: 43.4 % (ref 42–52)
HGB BLD-MCNC: 14.6 GM/DL (ref 14–18)
IMM GRANULOCYTES # BLD AUTO: 0.05 X10(3)/MCL (ref 0–0.04)
IMM GRANULOCYTES NFR BLD AUTO: 0.6 %
LYMPHOCYTES # BLD AUTO: 0.85 X10(3)/MCL (ref 0.6–4.6)
LYMPHOCYTES NFR BLD AUTO: 10.1 %
MCH RBC QN AUTO: 32.9 PG
MCHC RBC AUTO-ENTMCNC: 33.6 MG/DL (ref 33–36)
MCV RBC AUTO: 97.7 FL (ref 80–94)
MONOCYTES # BLD AUTO: 0.58 X10(3)/MCL (ref 0.1–1.3)
MONOCYTES NFR BLD AUTO: 6.9 %
NEUTROPHILS # BLD AUTO: 6.94 X10(3)/MCL (ref 2.1–9.2)
NEUTROPHILS NFR BLD AUTO: 82.3 %
NRBC BLD AUTO-RTO: 0 % (ref 0–1)
PLATELET # BLD AUTO: 151 X10(3)/MCL (ref 140–371)
PLATELETS.RETICULATED NFR BLD AUTO: 6.1 % (ref 0.9–11.2)
PMV BLD AUTO: 11.8 FL (ref 9.4–12.4)
POCT GLUCOSE: 186 MG/DL (ref 70–110)
POCT GLUCOSE: 191 MG/DL (ref 70–110)
POCT GLUCOSE: 243 MG/DL (ref 70–110)
POCT GLUCOSE: 284 MG/DL (ref 70–110)
POTASSIUM SERPL-SCNC: 4.4 MMOL/L (ref 3.5–5.1)
PROT SERPL-MCNC: 7.1 GM/DL (ref 5.8–7.6)
RBC # BLD AUTO: 4.44 X10(6)/MCL (ref 4.7–6.1)
SODIUM SERPL-SCNC: 139 MMOL/L (ref 136–145)
WBC # SPEC AUTO: 8.4 X10(3)/MCL (ref 4.5–11.5)

## 2022-12-22 PROCEDURE — 97162 PT EVAL MOD COMPLEX 30 MIN: CPT

## 2022-12-22 PROCEDURE — 27000221 HC OXYGEN, UP TO 24 HOURS

## 2022-12-22 PROCEDURE — 94640 AIRWAY INHALATION TREATMENT: CPT

## 2022-12-22 PROCEDURE — 99900035 HC TECH TIME PER 15 MIN (STAT)

## 2022-12-22 PROCEDURE — 85025 COMPLETE CBC W/AUTO DIFF WBC: CPT

## 2022-12-22 PROCEDURE — 80053 COMPREHEN METABOLIC PANEL: CPT

## 2022-12-22 PROCEDURE — 97166 OT EVAL MOD COMPLEX 45 MIN: CPT

## 2022-12-22 PROCEDURE — 63600175 PHARM REV CODE 636 W HCPCS: Performed by: INTERNAL MEDICINE

## 2022-12-22 PROCEDURE — 36415 COLL VENOUS BLD VENIPUNCTURE: CPT

## 2022-12-22 PROCEDURE — 63600175 PHARM REV CODE 636 W HCPCS: Performed by: STUDENT IN AN ORGANIZED HEALTH CARE EDUCATION/TRAINING PROGRAM

## 2022-12-22 PROCEDURE — 63600175 PHARM REV CODE 636 W HCPCS

## 2022-12-22 PROCEDURE — 94761 N-INVAS EAR/PLS OXIMETRY MLT: CPT

## 2022-12-22 PROCEDURE — 11000001 HC ACUTE MED/SURG PRIVATE ROOM

## 2022-12-22 PROCEDURE — 25000003 PHARM REV CODE 250: Performed by: INTERNAL MEDICINE

## 2022-12-22 PROCEDURE — 25000003 PHARM REV CODE 250

## 2022-12-22 RX ORDER — INSULIN GLARGINE 100 [IU]/ML
20 INJECTION, SOLUTION SUBCUTANEOUS 2 TIMES DAILY
Qty: 36 ML | Refills: 3 | Status: SHIPPED | OUTPATIENT
Start: 2022-12-22 | End: 2022-12-27 | Stop reason: HOSPADM

## 2022-12-22 RX ORDER — ATORVASTATIN CALCIUM 40 MG/1
40 TABLET, FILM COATED ORAL DAILY
Qty: 90 TABLET | Refills: 3 | Status: SHIPPED | OUTPATIENT
Start: 2022-12-23 | End: 2023-12-23

## 2022-12-22 RX ORDER — GABAPENTIN 300 MG/1
600 CAPSULE ORAL 3 TIMES DAILY
Status: DISCONTINUED | OUTPATIENT
Start: 2022-12-22 | End: 2022-12-30 | Stop reason: HOSPADM

## 2022-12-22 RX ORDER — DULOXETIN HYDROCHLORIDE 30 MG/1
30 CAPSULE, DELAYED RELEASE ORAL 2 TIMES DAILY
Status: DISCONTINUED | OUTPATIENT
Start: 2022-12-22 | End: 2022-12-30 | Stop reason: HOSPADM

## 2022-12-22 RX ADMIN — ENOXAPARIN SODIUM 40 MG: 40 INJECTION SUBCUTANEOUS at 05:12

## 2022-12-22 RX ADMIN — MORPHINE SULFATE 2 MG: 2 INJECTION, SOLUTION INTRAMUSCULAR; INTRAVENOUS at 08:12

## 2022-12-22 RX ADMIN — PIPERACILLIN AND TAZOBACTAM 4.5 G: 4; .5 INJECTION, POWDER, LYOPHILIZED, FOR SOLUTION INTRAVENOUS; PARENTERAL at 03:12

## 2022-12-22 RX ADMIN — PIPERACILLIN AND TAZOBACTAM 4.5 G: 4; .5 INJECTION, POWDER, LYOPHILIZED, FOR SOLUTION INTRAVENOUS; PARENTERAL at 01:12

## 2022-12-22 RX ADMIN — MICONAZOLE NITRATE: 20 CREAM TOPICAL at 09:12

## 2022-12-22 RX ADMIN — MORPHINE SULFATE 2 MG: 2 INJECTION, SOLUTION INTRAMUSCULAR; INTRAVENOUS at 04:12

## 2022-12-22 RX ADMIN — MORPHINE SULFATE 2 MG: 2 INJECTION, SOLUTION INTRAMUSCULAR; INTRAVENOUS at 12:12

## 2022-12-22 RX ADMIN — DIVALPROEX SODIUM 500 MG: 500 TABLET, FILM COATED, EXTENDED RELEASE ORAL at 09:12

## 2022-12-22 RX ADMIN — HYDROCODONE BITARTRATE AND ACETAMINOPHEN 1 TABLET: 5; 325 TABLET ORAL at 09:12

## 2022-12-22 RX ADMIN — HYDROCODONE BITARTRATE AND ACETAMINOPHEN 1 TABLET: 5; 325 TABLET ORAL at 05:12

## 2022-12-22 RX ADMIN — INSULIN ASPART 2 UNITS: 100 INJECTION, SOLUTION INTRAVENOUS; SUBCUTANEOUS at 09:12

## 2022-12-22 RX ADMIN — INSULIN ASPART 3 UNITS: 100 INJECTION, SOLUTION INTRAVENOUS; SUBCUTANEOUS at 08:12

## 2022-12-22 RX ADMIN — PIPERACILLIN AND TAZOBACTAM 4.5 G: 4; .5 INJECTION, POWDER, LYOPHILIZED, FOR SOLUTION INTRAVENOUS; PARENTERAL at 08:12

## 2022-12-22 RX ADMIN — VANCOMYCIN HYDROCHLORIDE 1000 MG: 500 INJECTION, POWDER, LYOPHILIZED, FOR SOLUTION INTRAVENOUS at 03:12

## 2022-12-22 RX ADMIN — INSULIN ASPART 4 UNITS: 100 INJECTION, SOLUTION INTRAVENOUS; SUBCUTANEOUS at 01:12

## 2022-12-22 RX ADMIN — GABAPENTIN 600 MG: 300 CAPSULE ORAL at 08:12

## 2022-12-22 RX ADMIN — MORPHINE SULFATE 2 MG: 2 INJECTION, SOLUTION INTRAMUSCULAR; INTRAVENOUS at 07:12

## 2022-12-22 RX ADMIN — SODIUM CHLORIDE: 9 INJECTION, SOLUTION INTRAVENOUS at 09:12

## 2022-12-22 RX ADMIN — GABAPENTIN 300 MG: 300 CAPSULE ORAL at 09:12

## 2022-12-22 RX ADMIN — HYDROCODONE BITARTRATE AND ACETAMINOPHEN 1 TABLET: 5; 325 TABLET ORAL at 10:12

## 2022-12-22 RX ADMIN — GABAPENTIN 600 MG: 300 CAPSULE ORAL at 04:12

## 2022-12-22 RX ADMIN — ATORVASTATIN CALCIUM 40 MG: 20 TABLET, FILM COATED ORAL at 10:12

## 2022-12-22 RX ADMIN — INSULIN DETEMIR 20 UNITS: 100 INJECTION, SOLUTION SUBCUTANEOUS at 08:12

## 2022-12-22 RX ADMIN — INSULIN DETEMIR 20 UNITS: 100 INJECTION, SOLUTION SUBCUTANEOUS at 09:12

## 2022-12-22 RX ADMIN — MICONAZOLE NITRATE: 20 CREAM TOPICAL at 10:12

## 2022-12-22 RX ADMIN — ASPIRIN 81 MG CHEWABLE TABLET 81 MG: 81 TABLET CHEWABLE at 09:12

## 2022-12-22 RX ADMIN — DULOXETINE 30 MG: 30 CAPSULE, DELAYED RELEASE ORAL at 05:12

## 2022-12-22 NOTE — PLAN OF CARE
Patient states that his son, Subhash Garcia, P: [phone], is moving furniture into their new apartment and they don't have any money for gas; asking if someone can help with a gas card. Spoke to Jazmin with [facility] [employer], P: [phone]. She stated that she will let patient's  know and they will try to obtain this for him.

## 2022-12-22 NOTE — PLAN OF CARE
Problem: Occupational Therapy  Goal: Occupational Therapy Goal  Description: Goals to be met by: d/c     Patient will increase functional independence with ADLs by performing:    LE Dressing with Modified [place].  Sitting at edge of bed x 30 minutes for meals with [place] and no c/o fatigue or LOB.  Rolling to Bilateral with Modified [place].   Supine to sit with Modified [place].  Toilet transfer to drop arm bedside commode with Modified [place].    Outcome: Ongoing, Progressing

## 2022-12-22 NOTE — PT/OT/SLP EVAL
Physical Therapy Evaluation      Patient Name: Con Arnold   MRN: [id number]  Recent Surgery: Procedure(s) (LRB):  AMPUTATION, BELOW KNEE (Left) 1 Day Post-Op    Recommendations:   Discharge Recommendations: rehabilitation facility   Discharge Equipment Recommendations: walker, rolling   Barriers to discharge: Inaccessible home and Decreased caregiver support    Assessment:     Con Arnold is a 62 y.o. male admitted with a medical diagnosis of Type 2 diabetes mellitus with left diabetic foot ulcer.   1. Uncontrolled type 2 diabetes mellitus with hyperglycemia    2. Diabetic ulcer of foot associated with diabetes mellitus due to underlying condition, limited to breakdown of skin, unspecified laterality, unspecified part of foot    3. Cough    4. Type 2 diabetes mellitus with left diabetic foot ulcer    Per MD note  Patient has hx of osteomyelitis of the foot with numerous admissions for antibiotic treatment. Patients was followed in [place] and was on suppressive doxycycline to which he has not taken in 2 months. He states he has run out of supplies for cleaning his foot ulcers, and states he is homeless. Patient was admitted in [place] where he received 1 month of antibiotics in July, and then another month in august in [place] where he had a toe and metatarsals removed from his right foot. He states that it helped however he has recently moved back. His feet are worse over the past month, he has pain though still is able to ambulate. He is with his son and they are trying to get housing although they have been running into difficulties.    Per pt him and his son obtained an apartment today however is on the 2nd floor. He reported his son was in poor health. Pt is agreeable to rehab    He presents with the following impairments/functional limitations: gait instability, impaired endurance, impaired balance, decreased lower extremity function, pain, impaired skin, edema.     Rehab Prognosis: Good; patient would benefit  from acute skilled PT services to address these deficits and reach maximum level of function.  Recent Surgery: Procedure(s) (LRB):  AMPUTATION, BELOW KNEE (Left) 1 Day Post-Op    Plan:     During this hospitalization, patient to be seen  (3-5 x week) to address the above listed problems via gait training, therapeutic activities, therapeutic exercises, wheelchair management/training    Plan of Care Expires: 01/21/23    Subjective     Chief Complaint: pain  Patient/Family Comments/Goals: to go to rehab  Pain/Comfort:  Pain Rating 1: 9/10  Location 1: leg  Pain Addressed 1: Reposition  Pain Rating Post-Intervention 1: 9/10    Social History:  Living Environment: Patient  just obtained a 2nd floor apartment with his son today who is in poor health  he is unsure of the number of steps. Reports tub shower combo   Prior Level of Function: Prior to admission, patient was independent with ADLs, driving and not driving. Pt ws (I) w/ ambulation  Equipment Used at Home:  other (see comments) (had BSC and w/c that was left in tx)  DME owned (not currently used): none  Assistance Upon Discharge:  unknown    Objective:     Communicated with nurse prior to session. Patient found  left leg elevated on pillows  with peripheral IV upon PT entry to room.    General Precautions: Standard, fall   Orthopedic Precautions:N/A   Braces: N/A   Respiratory Status: Room air    Exams:  Cognition: Patient is oriented to Person, Place, Time, Situation, Easily distracted and Follows one-step verbal commands  RLE ROM: WFL  RLE Strength: WFL  LLE ROM:  decr knee flexion and ext post op  LLE Strength:  hip 3/5; knee 3-/5  Coordination: WFL  Postural Exam: Patient presented with the following abnormalities:    -       No postural abnormalities identified  Sensation:    -       Intact  light/touch lower legs; numbness in hand    Functional Mobility:  Bed Mobility:  Supine to Sit: contact guard assistance  Sit to Supine: stand by  assistance  Transfers:  Sit to Stand: minimum assistance with rolling walker  Gait:   Distance: side steps towards the right until MD clarification on R foot WB status  Level of Assistance: moderate assistance for balance and RW management  AD used: rolling walker  Gait Deviations:small steps w/ trunk lean and guarded posture  Comments: pt c/o phantom pains  Balance:   Sitting: stand by assistance  Standing: minimum assistance      Therapeutic Activities and Exercises:   Patient educated on role of acute care PT and PT POC, safety while in hospital including calling nurse for mobility, and LLE positioning  Discharge plans also discussed SW notified of recs for rehab      Patient left HOB elevated with all lines intact, call button in reach, and RN notified.    GOALS:   Multidisciplinary Problems       Physical Therapy Goals          Problem: Physical Therapy    Goal Priority Disciplines Outcome Goal Variances Interventions   Physical Therapy Goal     PT, PT/OT      Description: Goals to be met by: discharge     Patient will increase functional independence with mobility by performin. Supine to sit with Modified [place]  2. Sit to supine with Modified [place]  3. Sit to stand transfer with Stand-by Assistance  4. Gait  x 50 feet with Minimal Assistance using Rolling Walker.   5. Wheelchair propulsion x130 feet with Supervision using bilateral uppper extremities                         History:     Past Medical History:   Diagnosis Date    Blindness due to type 2 diabetes mellitus     Cataract     Diabetes mellitus, type 2     Glaucoma     Hyperlipidemia     Neuropathy     Osteomyelitis        Past Surgical History:   Procedure Laterality Date    APPENDECTOMY      BELOW KNEE AMPUTATION OF LOWER EXTREMITY Left 2022    Procedure: AMPUTATION, BELOW KNEE;  Surgeon: William Aguayo MD;  Location: [facility];  Service: General;  Laterality: Left;    CARPAL TUNNEL RELEASE Bilateral     FEMORAL ARTERY  STENT Left     FOOT SURGERY         Family History   Problem Relation Age of Onset    COPD Mother     Macular degeneration Mother     Hypertension Father     Cancer Father        Social History     Socioeconomic History    Marital status:    Tobacco Use    Smoking status: Former     Types: Cigarettes    Smokeless tobacco: Never   Substance and Sexual Activity    Alcohol use: Never    Drug use: Never    Sexual activity: Yes     Time Tracking:     PT Received On: 12/22/22  PT Start Time: 1005  PT Stop Time: 1035  PT Total Time (min): 30 min     Billable Minutes: Evaluation 30    12/22/2022

## 2022-12-22 NOTE — PROGRESS NOTES
GENERAL SURGERY  PROGRESS NOTE    Admit Date: 12/16/2022  Hospital Day: 5    Subjective  AFVSS  S/p Left BKA  Pain well-controlled  No N/V/D  Feeling much better    Physical Exam:  Gen: NAD  HEENT: anicteric sclera, MMM  Chest: RR, unlabored respirations  Abd: s/nt/nd  S/p left BKA, ACE dressing in placed with minimal strikethrough; r/s foot with ace wrap with minimal strikethrough    Laboratory:  Recent Labs     12/20/22  0420 12/21/22  0358 12/22/22 0452   WBC 3.9* 5.4 8.4   HGB 14.0 15.6 14.6   HCT 40.3* 44.4 43.4   * 139* 151     Recent Labs     12/20/22 0420 12/21/22 0358 12/22/22 0452    137 139   K 3.9 4.1 4.4   CO2 23 23 25   BUN 10.9 12.9 24.0   CREATININE 0.90 0.81 1.07   CALCIUM 8.8 9.4 9.2   ALBUMIN 2.4* 2.6* 2.9*   BILITOT 0.4 0.5 0.6   AST 18 37* 59*   ALKPHOS 65 77 77   ALT 10 25 30         Assessment:  Con Arnold is a 62 y.o.m with PMH T2DM, HLD, cataracts, chronic osteomylelitis of the bilateral feet, s/p r/s 2nd digit ray amp with bilateral chronic diabetic foot wounds, now w/ c/f L foot OM on MRI       Plan:  - amenable to L BKA  - NPOmn  - OR today for L BKA  - consented, marked

## 2022-12-22 NOTE — PLAN OF CARE
New consult for rehab noted. Patient is in agreement with no preference. Referral sent to [facility] via [facility]. Will follow.

## 2022-12-22 NOTE — PROGRESS NOTES
"Inpatient Nutrition Evaluation    Admit Date: 12/16/2022   Total duration of encounter: 6 days    Nutrition Recommendation/Prescription     Diabetic, double protein diet  Boost Max once daily to provide 30 gm protein  Donaldo BID to provide Vit C, Zinc, Arginine for wound healing  Monitor Weights Weekly     Nutrition Assessment     Chart Review    Reason Seen: continuous nutrition monitoring and follow-up    Diagnosis:  Oseto & septic arthritis of left foot s/p Left BKA; Sarabjit Diabetic Ulcers, h/o osteo of sarabjit feet, Uncontrolled DM, Mild IMAN, Chronic airway disease likely COPD, h/o tobacco use    Relevant Medical History: Chronic osteomyelitis of sarabjit feet, HTN, Uncontrolled DM    Nutrition-Related Medications: ASA, Atorvastatin, Insulin, Zosyn, Vanc    Nutrition-Related Labs:  12/16/22 -- Hgb A1C 11.3 H  12/19/22 -- Glu 216 H, K 3.8, BUN 13.8, Cr 0.91  12/22 -- K4.4, BUN 24, Cr 1, Glu 223 H    Diet Order: Diet diabetic  Oral Supplement Order: Boost Max and Donaldo  Appetite/Oral Intake: fair/% of meals  Factors Affecting Nutritional Intake: decreased appetite  Food/Roman Catholic/Cultural Preferences: none reported  Food Allergies: none reported    Skin Integrity: wound  Wound(s):   Sarabjit Diabetic foot ulcers; s/p Left BKA    Comments    12/19/22 -- Pt reports fair appetite however stating "I force myself to eat" also reports being homeless since Sept of 2020; no indication of significant wt loss; pt with diabetic foot ulcers, pt asking for double protein -- will also order Boost Max & Donaldo to aid in wound healing    12/22/22 -- Pt continues with fair appetite however reporting eating most of his meals, drinking Boost & Donaldo to provide increased a nutrition for wound healing s/p Left BKA; New wt taken via bed elevated, will continue to monitor weekly for accuracy; Glu (H) - continue diabetic diet for best glucose control    Anthropometrics    Height: 6' (182.9 cm) Height Method: Stated  Last Weight: 104.4 kg (230 lb 2.6 " oz) (12/22/22 1040) Weight Method: Bed Scale  BMI (Calculated): 31.2  BMI Classification: overweight (BMI 25-29.9)        Ideal Body Weight (IBW), Male: 178 lb     % Ideal Body Weight, Male (lb): 117.72 %                          Usual Weight Provided By: patient denies unintentional weight loss    Wt Readings from Last 5 Encounters:   12/22/22 104.4 kg (230 lb 2.6 oz)   11/11/22 95.1 kg (209 lb 10.5 oz)   06/06/22 96.6 kg (213 lb)   11/18/21 101.3 kg (223 lb 5.2 oz)     Weight Change(s) Since Admission:  Admit Weight: 95.1 kg (209 lb 8.8 oz) (12/16/22 2154)  No indication of significant wt loss in the last 6 months, will continue to monitor  12/16/22 -- 95.1 kg  12/17/22 -- 94.8 kg  12/22/22 -- 104.4 kg, bed wt obtained during visit, will monitor weekly for accuracy    Patient Education    Not applicable.    Monitoring & Evaluation     Dietitian will monitor food and beverage intake, weight change, electrolyte/renal panel, glucose/endocrine profile, and gastrointestinal profile.  Nutrition Risk/Follow-Up: low (follow-up in 5-7 days)  Patients assigned 'low nutrition risk' status do not qualify for a full nutritional assessment but will be monitored and re-evaluated in a 5-7 day time period. Please consult if re-evaluation needed sooner.

## 2022-12-22 NOTE — PT/OT/SLP EVAL
Occupational Therapy   Evaluation    Name: Con Arnold  MRN: [id number]  Admitting Diagnosis: Type 2 diabetes mellitus with left diabetic foot ulcer  Patient Active Problem List   Diagnosis    Type 2 diabetes mellitus with left diabetic foot ulcer    S/P BKA (below knee amputation) unilateral, left    Uncontrolled type 2 diabetes mellitus with hyperglycemia       Recent Surgery: Procedure(s) (LRB):  AMPUTATION, BELOW KNEE (Left) 1 Day Post-Op    Recommendations:     Discharge Recommendations: rehabilitation facility  Discharge Equipment Recommendations:  walker, rolling, bath bench, bedside commode  Barriers to discharge:  None    Assessment:     Con Arnold is a 62 y.o. male with a medical diagnosis of Type 2 diabetes mellitus with left diabetic foot ulcer  Patient Active Problem List   Diagnosis    Type 2 diabetes mellitus with left diabetic foot ulcer    S/P BKA (below knee amputation) unilateral, left    Uncontrolled type 2 diabetes mellitus with hyperglycemia       He presents with functional decline. Performance deficits affecting function: gait instability, impaired self care skills, impaired endurance, decreased lower extremity function, pain, impaired skin, edema.      Rehab Prognosis: Good; patient would benefit from acute skilled OT services to address these deficits and reach maximum level of function.       Plan:     Patient to be seen  (2-5 times per week, M-F) to address the above listed problems via self-care/home management, therapeutic activities, therapeutic exercises  Plan of Care Expires:  (d/c)  Plan of Care Reviewed with: patient    Subjective     Chief Complaint: pain  Patient/Family Comments/goals: none stated    Occupational Profile:  Living Environment: Pt reported he lives in a new apartment with his son, second floor.  Pt is not aware of how many steps up to second floor. He stated there is a tub/shower combo in the bathroom.  Previous level of function: Pt reported he does drive,  doesn't work, did perform all self care skills on his own, as well as meal prep, home making tasks, ambulation with no A device.  Equipment Used at Home: other (see comments) (pt reported he did have a wheelchair and BSC but left it in [place] when he moved here)  Assistance upon Discharge: family care as needed    Pain/Comfort:  Pain Rating 1: 9/10  Location - Side 1: Left  Location - Orientation 1: lower  Location 1: leg  Pain Addressed 1: Nurse notified, Reposition  Pain Rating Post-Intervention 1: 9/10  Pain Addressed 2: Nurse notified    Patients cultural, spiritual, Anabaptism conflicts given the current situation: no    Objective:     Communicated with: nurse Mccracken prior to session.  Patient found supine with peripheral IV upon OT entry to room.    General Precautions: Standard, fall  Orthopedic Precautions: N/A  Braces: N/A  Respiratory Status: Room air    Occupational Performance:    Bed Mobility:    Patient completed Rolling/Turning to Left with  stand by assistance  Patient completed Rolling/Turning to Right with stand by assistance  Patient completed Supine to Sit with contact guard assistance  Patient completed Sit to Supine with stand by assistance    Functional Mobility/Transfers:  Patient completed Sit <> Stand Transfer with minimum assistance  with  rolling walker     Activities of Daily Living:  Lower Body Dressing: moderate assistance secondary to dec. Standing balance.  Safety a concern.    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  OT/PT instructed pt on proper positioning to LLE while supine in bed.  Pt voiced understanding.    Patient left supine with all lines intact, call button in reach, nurse notified, and HOB elevated to pt's comfort.    GOALS:   Multidisciplinary Problems       Occupational Therapy Goals          Problem: Occupational Therapy    Goal Priority Disciplines Outcome Interventions   Occupational Therapy Goal     OT, PT/OT Ongoing, Progressing    Description: Goals to be met by: d/c     Patient will increase functional independence with ADLs by performing:    LE Dressing with Modified [place].  Sitting at edge of bed x 30 minutes for meals with [place] and no c/o fatigue or LOB.  Rolling to Bilateral with Modified [place].   Supine to sit with Modified [place].  Toilet transfer to drop arm bedside commode with Modified [place].                         History:     Past Medical History:   Diagnosis Date    Blindness due to type 2 diabetes mellitus     Cataract     Diabetes mellitus, type 2     Glaucoma     Hyperlipidemia     Neuropathy     Osteomyelitis          Past Surgical History:   Procedure Laterality Date    APPENDECTOMY      BELOW KNEE AMPUTATION OF LOWER EXTREMITY Left 12/21/2022    Procedure: AMPUTATION, BELOW KNEE;  Surgeon: William Aguayo MD;  Location: [facility];  Service: General;  Laterality: Left;    CARPAL TUNNEL RELEASE Bilateral     FEMORAL ARTERY STENT Left     FOOT SURGERY         Time Tracking:     OT Date of Treatment: 12/22/22  OT Start Time: 1005  OT Stop Time: 1034  OT Total Time (min): 29 min    Billable Minutes:Evaluation 29 min    12/22/2022  "

## 2022-12-22 NOTE — PROGRESS NOTES
"[facility] Medicine Wards   Progress Note     Resident Team: [facility]  Attending Physician: Evie Burger MD  Resident: Rl Kang MD  Intern: Con Anaya MD     Hospital Length of Stay: 5 days    Subjective:      Brief HPI:  Con Arnold is a 62 y.o. male who with a history of chronic osteomyelitis of the foot bilaterally on suppresive doxycycline, htn, uncontrolled DMII, who presented to [facility] ED on 12/16/2022  with complaint of bilateral foot pain, ulcers.     Patient has hx of osteomyelitis of the foot with numerous admissions for antibiotic treatment. Patients was followed in [place] and was on suppressive doxycycline to which he has not taken in 2 months. He states he has run out of supplies for cleaning his foot ulcers, and states he is homeless. Patient was admitted in [place] where he received 1 month of antibiotics in July, and then another month in august in [place] where he had a toe and metatarsals removed from his right foot. He states that it helped however he has recently moved back. His feet are worse over the past month, he has pain though still is able to ambulate. He is with his son and they are trying to get housing although they have been running into difficulties. Patient sought consult due to unbearable pain, will admit for abx, wound assessment and treatment, and blood sugar control.     Of note he has not had insulin in over a year.    Interval History:Patient is post op day 1 of TOR SKINNER. He states he has some phantom limb pain on left leg, however the "sick feeling" he has from his left leg is gone. Otherwise no chest pain, headaches, dyspnea, swelling, or fevers.      Review of Systems   Constitutional:  Negative for chills, fever and weight loss.   Respiratory:  Negative for cough and wheezing.    Cardiovascular:  Negative for chest pain and palpitations.   Gastrointestinal:  Negative for abdominal pain, nausea and vomiting.   Musculoskeletal:  Negative for myalgias.     "    Phantom limb pain left foot   Neurological:  Negative for focal weakness and headaches.        Objective:     Vital Signs (Most Recent):  Temp: 97.6 °F (36.4 °C) (12/22/22 0721)  Pulse: 68 (12/22/22 0721)  Resp: 18 (12/22/22 0700)  BP: (!) 151/86 (12/22/22 0721)  SpO2: 95 % (12/22/22 0721)   Vital Signs (24h Range):  Temp:  [96.8 °F (36 °C)-98.3 °F (36.8 °C)] 97.6 °F (36.4 °C)  Pulse:  [65-93] 68  Resp:  [15-20] 18  SpO2:  [90 %-98 %] 95 %  BP: ()/(40-86) 151/86     Physical Examination:  Constitutional:       General: He is not in acute distress.     Appearance: Normal appearance. He is not ill-appearing.   HENT:      Head: Normocephalic and atraumatic.      Nose: Nose normal.   Eyes:      Extraocular Movements: Extraocular movements intact.      Conjunctiva/sclera: Conjunctivae normal.      Pupils: Pupils are equal, round, and reactive to light.   Cardiovascular:      Rate and Rhythm: Normal rate and regular rhythm.   Pulmonary:      Effort: Pulmonary effort is normal. No respiratory distress.      Breath sounds: CTAB     Comments: Breath sounds distant, no wheezing  Abdominal:      General: Abdomen is flat.      Palpations: Abdomen is soft.   Musculoskeletal:         General: Left leg, BKA, dressing non soaked on stump, with right foot dressed present. Normal range of motion.   Neurological:      Mental Status: He is alert.      Sensory: Sensory deficit present.   Psychiatric:         Mood and Affect: Mood normal.         Behavior: Behavior normal.      Laboratory:  Most Recent Data:  CBC:   Recent Labs   Lab 12/21/22  0358 12/22/22  0452   WBC 5.4 8.4   HGB 15.6 14.6   HCT 44.4 43.4   * 151       CMP:   Recent Labs   Lab 12/22/22  0452   CALCIUM 9.2   ALBUMIN 2.9*      K 4.4   CO2 25   BUN 24.0   CREATININE 1.07   ALKPHOS 77   ALT 30   AST 59*   BILITOT 0.6         Radiology:  Imaging Results              CT Foot W W/O Contrast Bilateral (Final result)  Result time 12/17/22 10:04:45       Final result by Ayse Martinez MD (12/17/22 10:04:45)                   Impression:      1. No definite acute fracture or acute erosive bone changes.  2. Chronic appearing changes at the left 2nd MTP joint.  3. Bilateral plantar surface soft tissue ulcerations and blisters.  Otherwise no deep drainable fluid collection.  4. 3 mm radiopaque foreign body in the plantar surface underlying the right 1st metatarsal head.      Electronically signed by: Ayse Martinez  Date:    12/17/2022  Time:    10:04               Narrative:    EXAMINATION:  CT FOOT W W/O CONTRAST BILATERAL    CLINICAL HISTORY:  Foot pain, chronic, osseous injury suspected;Diabetic Foot, r/o osteo;    TECHNIQUE:  CT images of the bilateral feet with and without contrast.  Axial, coronal, and sagittal reformatted images were obtained. Dose length product is 143 mGycm. Automatic exposure control, adjustment of mA/kV or iterative reconstruction technique was used to limit radiation dose.    COMPARISON:  X-rays dated 12/16/2022    FINDINGS:  Right foot:    There is no acute fracture identified.  There are no definite acute erosive bone changes.    There is a soft tissue ulceration along the plantar surface underlying the 3rd metatarsal head, with blister at the skin surface measuring 5 x 23 mm in size (series 14, image 32).  There is otherwise no drainable fluid collection.  There is a 3 mm radiopaque foreign body in the plantar surface underlying the 1st metatarsal head.    Left foot:    There is no definite acute fracture identified.  There is dorsal subluxation at the 2nd and 3rd MTP joints.  There are chronic appearing changes at the 2nd MTP joint.  There are no definite acute erosive changes identified.    There is plantar soft tissue ulceration with blister at the skin surface underlying the 2nd and 3rd metatarsal heads.  There is otherwise no drainable fluid collection identified.  There is no radiopaque foreign body.                                        X-Ray Foot Complete Right (Final result)  Result time 12/17/22 10:43:42      Final result by Yoly Mendez MD (12/17/22 10:43:42)                   Impression:      No change since prior      Electronically signed by: Yoly Mendez  Date:    12/17/2022  Time:    10:43               Narrative:    EXAMINATION:  XR FOOT COMPLETE 3 VIEW RIGHT    CLINICAL HISTORY:  . Diabetes mellitus due to underlying condition with foot ulcer    TECHNIQUE:  AP, lateral, and oblique views of the right foot were performed.    COMPARISON:  11/11/2022    FINDINGS:  Patient is status post amputation of the distal 2nd metatarsal.  There is a radiopaque foreign body seen at the base of the 1st toe.  It is unchanged since prior examination.  No fracture seen.  No dislocation is seen.                                       X-Ray Foot Complete Left (Final result)  Result time 12/17/22 10:36:22      Final result by Audi Giles MD (12/17/22 10:36:22)                   Impression:      Degenerative changes.    Dislocations slight subluxation of the 2nd and 3rd metatarsophalangeal joints.    Soft tissue ulceration.    No clear evidence of osteomyelitis.      Electronically signed by: Audi Giles  Date:    12/17/2022  Time:    10:36               Narrative:    EXAMINATION:  XR FOOT COMPLETE 3 VIEW LEFT    CLINICAL HISTORY:  Diabetes mellitus due to underlying condition with foot ulcer    COMPARISON:  None.    FINDINGS:  No acute displaced fractures or dislocations.    There are some degenerative changes of the proximal and distal interphalangeal joints with a slight hilus valgus deformity    There are persistent dislocations/subluxations involving the 2nd and 3rd metatarsophalangeal joints which appears to be unchanged as compared with the previous exam    There might be soft tissue disruption indicating the presence of an ulceration, however, on the provided images there is no evidence of primary or secondary  signs to suggest the presence of osteomyelitis other imaging modalities might prove helpful for further assessment                                       X-Ray Chest PA And Lateral (Final result)  Result time 12/17/22 10:28:04      Final result by Audi Giles MD (12/17/22 10:28:04)                   Impression:      No acute chest disease is identified.      Electronically signed by: Audi Giles  Date:    12/17/2022  Time:    10:28               Narrative:    EXAMINATION:  XR CHEST PA AND LATERAL    CLINICAL HISTORY:  , Cough, unspecified.    COMPARISON:  October 26, 2021    FINDINGS:  No alveolar consolidation, effusion, or pneumothorax is seen.   The thoracic aorta is normal  cardiac silhouette, central pulmonary vessels and mediastinum are normal in size and are grossly unremarkable.   visualized osseous structures are grossly unremarkable.                                      MRI Foot (Forefoot) Left W W/O Contrast 12/19/2022    Narrative  EXAMINATION:  MRI FOOT (FOREFOOT) LEFT W W/O CONTRAST    CLINICAL HISTORY:  Foot swelling, diabetic, osteomyelitis suspected, xray done;    TECHNIQUE:  Enhanced and unenhanced, multiplanar, multisequence MR imaging of the left foot was obtained.    COMPARISON:  MRI contralateral foot used for comparison dated 12/19/2022.  CT scan used for comparison dated 12/17/2022    FINDINGS:  Incongruent hallux valgus deformity.  Trabecular edema present to the distal phalanx of the great toe is equivocal.  Trabecular edema and an effusion present to the 1st metatarsophalangeal joint may represent early osteomyelitis.  Destructive osteomyelitis and septic arthritis are present to the 2nd and 3rd metatarsophalangeal joints with dorsal dislocation of the bases of the proximal phalanges and full-thickness tears of the plantar plates.  The flexor tendon of the 2nd toe is largely torn.  The 3rd flexor tendon is medially dislocated but appears structurally intact.  An abscess is  present about the 3rd metatarsal head and measures 3.5 cm x 2 cm in size.  It likely represents decompressed septic arthritis.    Myositis is present to the anterior intrinsic muscles.  An ulcer is present to the plantar aspect of the forefoot with necrotic tissue present in the ulcer base.  This measures approximately 4.5 cm x 3 cm and is nicely demonstrated on image 19 of series 9.  Suppurative flexor tenosynovitis is present to the midfoot.    Impression  Destructive septic arthritis/osteomyelitis is present to the 2nd and 3rd tarsal metatarsal joints with dorsal dislocation of the bases of the proximal phalanges and full-thickness tearing of the plantar plates.  The 2nd flexor tendon is largely torn.  The 3rd flexor tendon is dislocated medially.  A 3.5 cm x 2 cm abscess is present about the 3rd metatarsal head and likely represents decompressed septic arthritis.    Septic arthritis is likely present to the 1st metatarsal phalangeal joint.  Edema and trace enhancement seen to the distal phalanx of the great toe is equivocal..    Suppurative flexor tenosynovitis.  An ulcer is present to the plantar aspect of the forefoot with 4.5 cm of necrotic tissue present in the ulcer base.  This is nicely demonstrated on image 19 of series 9.    Myositis to the anterior intrinsic muscles.  Cellulitis is present to the forefoot.      Electronically signed by: Kenneth Madsen  Date:    12/19/2022  Time:    17:16    Current Medications:     Infusions:   sodium chloride 0.9% 125 mL/hr at 12/21/22 3739        Scheduled:   aspirin  81 mg Oral Daily    atorvastatin  40 mg Oral Daily    ceFAZolin  2 g Intravenous Once    collagenase   Topical (Top) Daily    divalproex  500 mg Oral Daily    enoxaparin  40 mg Subcutaneous Daily    fluticasone furoate-vilanteroL  1 puff Inhalation Daily    gabapentin  300 mg Oral TID    insulin detemir U-100  20 Units Subcutaneous QHS    insulin detemir U-100  20 Units Subcutaneous Daily    miconazole    Topical (Top) BID    piperacillin-tazobactam (ZOSYN) IVPB  4.5 g Intravenous Q8H    vancomycin (VANCOCIN) IVPB  1,000 mg Intravenous Q12H        PRN:  albuterol-ipratropium, dextrose 10%, dextrose 10%, glucagon (human recombinant), glucose, glucose, HYDROcodone-acetaminophen, insulin aspart U-100, morphine, naloxone, sodium chloride 0.9%, sodium chloride 0.9%, Pharmacy to dose Vancomycin consult **AND** vancomycin - pharmacy to dose    Antibiotics and Day Number of Therapy:  Vancomycin and Zosyn - Day 5      Intake/Output Summary (Last 24 hours) at 12/22/2022 0733  Last data filed at 12/22/2022 0535  Gross per 24 hour   Intake 4330 ml   Output 1400 ml   Net 2930 ml         Lines/Drains/Airways       Peripheral Intravenous Line  Duration                  Peripheral IV - Double Lumen 12/21/22 0150 20 G Left;Posterior Hand 1 day                  Assessment & Plan:   Diabetic Ulcers, Bilateral  Hx of Osteomyelitis of the Feet, Bilateral  Osteomyelitis and Septic arthritis, Left Foot S/P left BKA post-op day 1  Uncontrolled DMII  - HbA1c 11.3% at this time  - Has not taken insulin in over 1 year  - Worsening ulcers, left worse than right on plantar surfaces   - Strict glucose monitoring  - Started Long acting Levemir 20 units QHS, 20 units am - controlled apart from immediate post-operative period, 191 this AM  - Vancomycin and Zosyn to cover polymicrobial etiologies, will continue for 2-5 more days  - CT foot without obvious osteomyelitis bilaterally  - MRI foot bilateral - Osteomyelitis, septic arthritis in left foot  - Surgery on board; BKA done  - PRN Pain medications in place  - Gabapentin 300mg on board for diabetic neuropathy  - Wound care consulted  - [facility] PT and OT       Mild IMAN likely from glucosuric losses - resolved  - Continue NS  - Renal indices improved      Chronic airway disease, likely COPD no PFTs at this time  Hx of Tobacco Use   - DuoNebs scheduled q6h PRN  - Improving        CODE STATUS: Full  Code  Access: Peripheral  Antibiotics: Vanc and Zosyn Day 4  Diet: Diabetic  DVT Prophylaxis: Lovenox  GI Prophylaxis: none needed  Fluids: normal saline 125 ml/hr       Disposition: Day 5 of admission for s/p BKA post op day-1, some pain but overall doing well, sugars controlled, future plans for placement include SNF vs. Home Health with PT    Con Anaya MD  [employer] Internal [facility] PGY-1

## 2022-12-23 LAB
ALBUMIN SERPL-MCNC: 2.5 G/DL (ref 3.4–4.8)
ALBUMIN/GLOB SERPL: 0.7 RATIO (ref 1.1–2)
ALP SERPL-CCNC: 61 UNIT/L (ref 40–150)
ALT SERPL-CCNC: 25 UNIT/L (ref 0–55)
AST SERPL-CCNC: 41 UNIT/L (ref 5–34)
BASOPHILS # BLD AUTO: 0.05 X10(3)/MCL (ref 0–0.2)
BASOPHILS NFR BLD AUTO: 0.7 %
BILIRUBIN DIRECT+TOT PNL SERPL-MCNC: 0.4 MG/DL
BUN SERPL-MCNC: 20.7 MG/DL (ref 8.4–25.7)
CALCIUM SERPL-MCNC: 8.7 MG/DL (ref 8.8–10)
CHLORIDE SERPL-SCNC: 104 MMOL/L (ref 98–107)
CO2 SERPL-SCNC: 25 MMOL/L (ref 23–31)
CREAT SERPL-MCNC: 0.83 MG/DL (ref 0.73–1.18)
EOSINOPHIL # BLD AUTO: 0.05 X10(3)/MCL (ref 0–0.9)
EOSINOPHIL NFR BLD AUTO: 0.7 %
ERYTHROCYTE [DISTWIDTH] IN BLOOD BY AUTOMATED COUNT: 13.2 % (ref 11.6–14.4)
GFR SERPLBLD CREATININE-BSD FMLA CKD-EPI: >60 MLS/MIN/1.73/M2
GLOBULIN SER-MCNC: 3.5 GM/DL (ref 2.4–3.5)
GLUCOSE SERPL-MCNC: 184 MG/DL (ref 82–115)
HCT VFR BLD AUTO: 37.3 % (ref 42–52)
HGB BLD-MCNC: 12.7 GM/DL (ref 14–18)
IMM GRANULOCYTES # BLD AUTO: 0.11 X10(3)/MCL (ref 0–0.04)
IMM GRANULOCYTES NFR BLD AUTO: 1.5 %
LYMPHOCYTES # BLD AUTO: 1.56 X10(3)/MCL (ref 0.6–4.6)
LYMPHOCYTES NFR BLD AUTO: 20.9 %
MCH RBC QN AUTO: 33 PG
MCHC RBC AUTO-ENTMCNC: 34 MG/DL (ref 33–36)
MCV RBC AUTO: 96.9 FL (ref 80–94)
MONOCYTES # BLD AUTO: 0.74 X10(3)/MCL (ref 0.1–1.3)
MONOCYTES NFR BLD AUTO: 9.9 %
NEUTROPHILS # BLD AUTO: 4.94 X10(3)/MCL (ref 2.1–9.2)
NEUTROPHILS NFR BLD AUTO: 66.3 %
NRBC BLD AUTO-RTO: 0 % (ref 0–1)
PLATELET # BLD AUTO: 140 X10(3)/MCL (ref 140–371)
PLATELETS.RETICULATED NFR BLD AUTO: 4.6 % (ref 0.9–11.2)
PMV BLD AUTO: 11 FL (ref 9.4–12.4)
POCT GLUCOSE: 147 MG/DL (ref 70–110)
POCT GLUCOSE: 209 MG/DL (ref 70–110)
POCT GLUCOSE: 229 MG/DL (ref 70–110)
POCT GLUCOSE: 237 MG/DL (ref 70–110)
POTASSIUM SERPL-SCNC: 4.3 MMOL/L (ref 3.5–5.1)
PROT SERPL-MCNC: 6 GM/DL (ref 5.8–7.6)
RBC # BLD AUTO: 3.85 X10(6)/MCL (ref 4.7–6.1)
SODIUM SERPL-SCNC: 136 MMOL/L (ref 136–145)
WBC # SPEC AUTO: 7.5 X10(3)/MCL (ref 4.5–11.5)

## 2022-12-23 PROCEDURE — 97530 THERAPEUTIC ACTIVITIES: CPT

## 2022-12-23 PROCEDURE — 97116 GAIT TRAINING THERAPY: CPT

## 2022-12-23 PROCEDURE — 25000003 PHARM REV CODE 250

## 2022-12-23 PROCEDURE — 94761 N-INVAS EAR/PLS OXIMETRY MLT: CPT

## 2022-12-23 PROCEDURE — 85025 COMPLETE CBC W/AUTO DIFF WBC: CPT

## 2022-12-23 PROCEDURE — 94640 AIRWAY INHALATION TREATMENT: CPT

## 2022-12-23 PROCEDURE — 63600175 PHARM REV CODE 636 W HCPCS: Performed by: STUDENT IN AN ORGANIZED HEALTH CARE EDUCATION/TRAINING PROGRAM

## 2022-12-23 PROCEDURE — 36415 COLL VENOUS BLD VENIPUNCTURE: CPT

## 2022-12-23 PROCEDURE — 25000242 PHARM REV CODE 250 ALT 637 W/ HCPCS

## 2022-12-23 PROCEDURE — 11000001 HC ACUTE MED/SURG PRIVATE ROOM

## 2022-12-23 PROCEDURE — 63600175 PHARM REV CODE 636 W HCPCS: Performed by: INTERNAL MEDICINE

## 2022-12-23 PROCEDURE — 63600175 PHARM REV CODE 636 W HCPCS

## 2022-12-23 PROCEDURE — 25000003 PHARM REV CODE 250: Performed by: INTERNAL MEDICINE

## 2022-12-23 PROCEDURE — 80053 COMPREHEN METABOLIC PANEL: CPT

## 2022-12-23 RX ADMIN — MORPHINE SULFATE 2 MG: 2 INJECTION, SOLUTION INTRAMUSCULAR; INTRAVENOUS at 08:12

## 2022-12-23 RX ADMIN — ASPIRIN 81 MG CHEWABLE TABLET 81 MG: 81 TABLET CHEWABLE at 08:12

## 2022-12-23 RX ADMIN — DULOXETINE 30 MG: 30 CAPSULE, DELAYED RELEASE ORAL at 08:12

## 2022-12-23 RX ADMIN — MICONAZOLE NITRATE: 20 CREAM TOPICAL at 08:12

## 2022-12-23 RX ADMIN — GABAPENTIN 600 MG: 300 CAPSULE ORAL at 03:12

## 2022-12-23 RX ADMIN — PIPERACILLIN AND TAZOBACTAM 4.5 G: 4; .5 INJECTION, POWDER, LYOPHILIZED, FOR SOLUTION INTRAVENOUS; PARENTERAL at 04:12

## 2022-12-23 RX ADMIN — DIVALPROEX SODIUM 500 MG: 500 TABLET, FILM COATED, EXTENDED RELEASE ORAL at 08:12

## 2022-12-23 RX ADMIN — INSULIN ASPART 2 UNITS: 100 INJECTION, SOLUTION INTRAVENOUS; SUBCUTANEOUS at 09:12

## 2022-12-23 RX ADMIN — GABAPENTIN 600 MG: 300 CAPSULE ORAL at 08:12

## 2022-12-23 RX ADMIN — IPRATROPIUM BROMIDE AND ALBUTEROL SULFATE 3 ML: 2.5; .5 SOLUTION RESPIRATORY (INHALATION) at 03:12

## 2022-12-23 RX ADMIN — INSULIN DETEMIR 20 UNITS: 100 INJECTION, SOLUTION SUBCUTANEOUS at 09:12

## 2022-12-23 RX ADMIN — DULOXETINE 30 MG: 30 CAPSULE, DELAYED RELEASE ORAL at 09:12

## 2022-12-23 RX ADMIN — FLUTICASONE FUROATE AND VILANTEROL TRIFENATATE 1 PUFF: 100; 25 POWDER RESPIRATORY (INHALATION) at 07:12

## 2022-12-23 RX ADMIN — HYDROCODONE BITARTRATE AND ACETAMINOPHEN 1 TABLET: 5; 325 TABLET ORAL at 05:12

## 2022-12-23 RX ADMIN — INSULIN ASPART 4 UNITS: 100 INJECTION, SOLUTION INTRAVENOUS; SUBCUTANEOUS at 12:12

## 2022-12-23 RX ADMIN — HYDROCODONE BITARTRATE AND ACETAMINOPHEN 1 TABLET: 5; 325 TABLET ORAL at 09:12

## 2022-12-23 RX ADMIN — GABAPENTIN 600 MG: 300 CAPSULE ORAL at 09:12

## 2022-12-23 RX ADMIN — MORPHINE SULFATE 2 MG: 2 INJECTION, SOLUTION INTRAMUSCULAR; INTRAVENOUS at 02:12

## 2022-12-23 RX ADMIN — IPRATROPIUM BROMIDE AND ALBUTEROL SULFATE 3 ML: 2.5; .5 SOLUTION RESPIRATORY (INHALATION) at 07:12

## 2022-12-23 RX ADMIN — ATORVASTATIN CALCIUM 40 MG: 20 TABLET, FILM COATED ORAL at 08:12

## 2022-12-23 RX ADMIN — ENOXAPARIN SODIUM 40 MG: 40 INJECTION SUBCUTANEOUS at 03:12

## 2022-12-23 RX ADMIN — HYDROCODONE BITARTRATE AND ACETAMINOPHEN 1 TABLET: 5; 325 TABLET ORAL at 03:12

## 2022-12-23 RX ADMIN — MORPHINE SULFATE 2 MG: 2 INJECTION, SOLUTION INTRAMUSCULAR; INTRAVENOUS at 01:12

## 2022-12-23 RX ADMIN — INSULIN DETEMIR 20 UNITS: 100 INJECTION, SOLUTION SUBCUTANEOUS at 08:12

## 2022-12-23 RX ADMIN — IPRATROPIUM BROMIDE AND ALBUTEROL SULFATE 3 ML: 2.5; .5 SOLUTION RESPIRATORY (INHALATION) at 11:12

## 2022-12-23 RX ADMIN — COLLAGENASE SANTYL: 250 OINTMENT TOPICAL at 08:12

## 2022-12-23 RX ADMIN — INSULIN ASPART 4 UNITS: 100 INJECTION, SOLUTION INTRAVENOUS; SUBCUTANEOUS at 04:12

## 2022-12-23 NOTE — PROGRESS NOTES
GENERAL SURGERY  PROGRESS NOTE    Admit Date: 12/16/2022  Hospital Day: 6    Subjective  AFVSS  Doing well  Mild pain to LLE  No N/v/d    Physical Exam:  Gen: NAD  HEENT: anicteric sclera, MMM  Chest: RR, unlabored respirations  Abd: s/nt/nd  S/p left BKA, ACE dressing in placed with minimal strikethrough; r/s foot with ace wrap with minimal strikethrough    Laboratory:  Recent Labs     12/21/22 0358 12/22/22 0452 12/23/22 0351   WBC 5.4 8.4 7.5   HGB 15.6 14.6 12.7*   HCT 44.4 43.4 37.3*   * 151 140     Recent Labs     12/21/22 0358 12/22/22 0452 12/23/22 0351    139 136   K 4.1 4.4 4.3   CO2 23 25 25   BUN 12.9 24.0 20.7   CREATININE 0.81 1.07 0.83   CALCIUM 9.4 9.2 8.7*   ALBUMIN 2.6* 2.9* 2.5*   BILITOT 0.5 0.6 0.4   AST 37* 59* 41*   ALKPHOS 77 77 61   ALT 25 30 25         Assessment:  Con Arnold is a 62 y.o.m with PMH T2DM, HLD, cataracts, chronic osteomylelitis of the bilateral feet, s/p r/s 2nd digit ray amp with bilateral chronic diabetic foot wounds, now w/ c/f L foot OM on MRI. S/p L BKA 12/21       Plan:  Plan to take down dressing later today  Pain control, abx, other care per primary

## 2022-12-23 NOTE — PLAN OF CARE
Spoke to Alina at [facility]. Patient has been accepted; they submitted for insurance authorization today. Transfer date is pending insurance approval. Patient has been updated. Will follow.

## 2022-12-23 NOTE — PROGRESS NOTES
"[facility] Medicine Wards   Progress Note     Resident Team: [facility] Medicine List 1  Attending Physician: Evie Burger MD  Resident: Rl Kang MD  Intern: Con Anaya MD     Hospital Length of Stay: 6 days    Subjective:      Brief HPI:  Con Arnold is a 62 y.o. male who with a history of chronic osteomyelitis of the foot bilaterally on suppresive doxycycline, htn, uncontrolled DMII, who presented to [facility] ED on 12/16/2022  with complaint of bilateral foot pain, ulcers.     Patient has hx of osteomyelitis of the foot with numerous admissions for antibiotic treatment. Patients was followed in [place] and was on suppressive doxycycline to which he has not taken in 2 months. He states he has run out of supplies for cleaning his foot ulcers, and states he is homeless. Patient was admitted in [place] where he received 1 month of antibiotics in July, and then another month in august in [place] where he had a toe and metatarsals removed from his right foot. He states that it helped however he has recently moved back. His feet are worse over the past month, he has pain though still is able to ambulate. He is with his son and they are trying to get housing although they have been running into difficulties. Patient sought consult due to unbearable pain, will admit for abx, wound assessment and treatment, and blood sugar control.     Of note he has not had insulin in over a year.    Interval History:Patient is post op day 2 of BKA. RAMIN SKINNER. He states he has some phantom limb pain on left leg, however the "sick feeling" he has from his left leg is gone. Patient had increased pain on his left leg, however more so phantom limb phenomenon. Amenable to increase in gabapentin. Started duloxetine 30mg as well.    Review of Systems   Constitutional:  Negative for chills, fever and weight loss.   Respiratory:  Negative for cough and wheezing.    Cardiovascular:  Negative for chest pain and palpitations.   Gastrointestinal:  Negative " for abdominal pain, nausea and vomiting.   Musculoskeletal:  Negative for myalgias.        Phantom limb pain left foot   Neurological:  Negative for focal weakness and headaches.        Objective:     Vital Signs (Most Recent):  Temp: 97.9 °F (36.6 °C) (12/23/22 0325)  Pulse: 72 (12/23/22 0325)  Resp: 20 (12/23/22 0520)  BP: 138/80 (12/23/22 0325)  SpO2: (!) 94 % (12/23/22 0325)   Vital Signs (24h Range):  Temp:  [97.6 °F (36.4 °C)-98.4 °F (36.9 °C)] 97.9 °F (36.6 °C)  Pulse:  [68-93] 72  Resp:  [16-20] 20  SpO2:  [94 %-95 %] 94 %  BP: (133-151)/(71-86) 138/80     Physical Examination:  Constitutional:       General: He is not in acute distress.     Appearance: Normal appearance. He is not ill-appearing.   HENT:      Head: Normocephalic and atraumatic.      Nose: Nose normal.   Eyes:      Extraocular Movements: Extraocular movements intact.      Conjunctiva/sclera: Conjunctivae normal.      Pupils: Pupils are equal, round, and reactive to light.   Cardiovascular:      Rate and Rhythm: Normal rate and regular rhythm.   Pulmonary:      Effort: Pulmonary effort is normal. No respiratory distress.      Breath sounds: CTAB     Comments: Breath sounds distant, no wheezing  Abdominal:      General: Abdomen is flat.      Palpations: Abdomen is soft.   Musculoskeletal:         General: Left leg, BKA, dressing non soaked on stump, with right foot dressed present. Normal range of motion.   Neurological:      Mental Status: He is alert.      Sensory: Sensory deficit present.   Psychiatric:         Mood and Affect: Mood normal.         Behavior: Behavior normal.      Laboratory:  Most Recent Data:  CBC:   Recent Labs   Lab 12/22/22  0452 12/23/22  0351   WBC 8.4 7.5   HGB 14.6 12.7*   HCT 43.4 37.3*    140       CMP:   Recent Labs   Lab 12/23/22  0351   CALCIUM 8.7*   ALBUMIN 2.5*      K 4.3   CO2 25   BUN 20.7   CREATININE 0.83   ALKPHOS 61   ALT 25   AST 41*   BILITOT 0.4         Radiology:  Imaging Results               CT Foot W W/O Contrast Bilateral (Final result)  Result time 12/17/22 10:04:45      Final result by Ayse Martinez MD (12/17/22 10:04:45)                   Impression:      1. No definite acute fracture or acute erosive bone changes.  2. Chronic appearing changes at the left 2nd MTP joint.  3. Bilateral plantar surface soft tissue ulcerations and blisters.  Otherwise no deep drainable fluid collection.  4. 3 mm radiopaque foreign body in the plantar surface underlying the right 1st metatarsal head.      Electronically signed by: Ayse Martinez  Date:    12/17/2022  Time:    10:04               Narrative:    EXAMINATION:  CT FOOT W W/O CONTRAST BILATERAL    CLINICAL HISTORY:  Foot pain, chronic, osseous injury suspected;Diabetic Foot, r/o osteo;    TECHNIQUE:  CT images of the bilateral feet with and without contrast.  Axial, coronal, and sagittal reformatted images were obtained. Dose length product is 143 mGycm. Automatic exposure control, adjustment of mA/kV or iterative reconstruction technique was used to limit radiation dose.    COMPARISON:  X-rays dated 12/16/2022    FINDINGS:  Right foot:    There is no acute fracture identified.  There are no definite acute erosive bone changes.    There is a soft tissue ulceration along the plantar surface underlying the 3rd metatarsal head, with blister at the skin surface measuring 5 x 23 mm in size (series 14, image 32).  There is otherwise no drainable fluid collection.  There is a 3 mm radiopaque foreign body in the plantar surface underlying the 1st metatarsal head.    Left foot:    There is no definite acute fracture identified.  There is dorsal subluxation at the 2nd and 3rd MTP joints.  There are chronic appearing changes at the 2nd MTP joint.  There are no definite acute erosive changes identified.    There is plantar soft tissue ulceration with blister at the skin surface underlying the 2nd and 3rd metatarsal heads.  There is otherwise no drainable  fluid collection identified.  There is no radiopaque foreign body.                                       X-Ray Foot Complete Right (Final result)  Result time 12/17/22 10:43:42      Final result by Yoly Mendez MD (12/17/22 10:43:42)                   Impression:      No change since prior      Electronically signed by: Yoly Mendez  Date:    12/17/2022  Time:    10:43               Narrative:    EXAMINATION:  XR FOOT COMPLETE 3 VIEW RIGHT    CLINICAL HISTORY:  . Diabetes mellitus due to underlying condition with foot ulcer    TECHNIQUE:  AP, lateral, and oblique views of the right foot were performed.    COMPARISON:  11/11/2022    FINDINGS:  Patient is status post amputation of the distal 2nd metatarsal.  There is a radiopaque foreign body seen at the base of the 1st toe.  It is unchanged since prior examination.  No fracture seen.  No dislocation is seen.                                       X-Ray Foot Complete Left (Final result)  Result time 12/17/22 10:36:22      Final result by Audi Giles MD (12/17/22 10:36:22)                   Impression:      Degenerative changes.    Dislocations slight subluxation of the 2nd and 3rd metatarsophalangeal joints.    Soft tissue ulceration.    No clear evidence of osteomyelitis.      Electronically signed by: Audi Gilse  Date:    12/17/2022  Time:    10:36               Narrative:    EXAMINATION:  XR FOOT COMPLETE 3 VIEW LEFT    CLINICAL HISTORY:  Diabetes mellitus due to underlying condition with foot ulcer    COMPARISON:  None.    FINDINGS:  No acute displaced fractures or dislocations.    There are some degenerative changes of the proximal and distal interphalangeal joints with a slight hilus valgus deformity    There are persistent dislocations/subluxations involving the 2nd and 3rd metatarsophalangeal joints which appears to be unchanged as compared with the previous exam    There might be soft tissue disruption indicating the presence of  an ulceration, however, on the provided images there is no evidence of primary or secondary signs to suggest the presence of osteomyelitis other imaging modalities might prove helpful for further assessment                                       X-Ray Chest PA And Lateral (Final result)  Result time 12/17/22 10:28:04      Final result by Audi Giles MD (12/17/22 10:28:04)                   Impression:      No acute chest disease is identified.      Electronically signed by: Audi Giles  Date:    12/17/2022  Time:    10:28               Narrative:    EXAMINATION:  XR CHEST PA AND LATERAL    CLINICAL HISTORY:  , Cough, unspecified.    COMPARISON:  October 26, 2021    FINDINGS:  No alveolar consolidation, effusion, or pneumothorax is seen.   The thoracic aorta is normal  cardiac silhouette, central pulmonary vessels and mediastinum are normal in size and are grossly unremarkable.   visualized osseous structures are grossly unremarkable.                                      MRI Foot (Forefoot) Left W W/O Contrast 12/19/2022    Narrative  EXAMINATION:  MRI FOOT (FOREFOOT) LEFT W W/O CONTRAST    CLINICAL HISTORY:  Foot swelling, diabetic, osteomyelitis suspected, xray done;    TECHNIQUE:  Enhanced and unenhanced, multiplanar, multisequence MR imaging of the left foot was obtained.    COMPARISON:  MRI contralateral foot used for comparison dated 12/19/2022.  CT scan used for comparison dated 12/17/2022    FINDINGS:  Incongruent hallux valgus deformity.  Trabecular edema present to the distal phalanx of the great toe is equivocal.  Trabecular edema and an effusion present to the 1st metatarsophalangeal joint may represent early osteomyelitis.  Destructive osteomyelitis and septic arthritis are present to the 2nd and 3rd metatarsophalangeal joints with dorsal dislocation of the bases of the proximal phalanges and full-thickness tears of the plantar plates.  The flexor tendon of the 2nd toe is largely torn.  The  3rd flexor tendon is medially dislocated but appears structurally intact.  An abscess is present about the 3rd metatarsal head and measures 3.5 cm x 2 cm in size.  It likely represents decompressed septic arthritis.    Myositis is present to the anterior intrinsic muscles.  An ulcer is present to the plantar aspect of the forefoot with necrotic tissue present in the ulcer base.  This measures approximately 4.5 cm x 3 cm and is nicely demonstrated on image 19 of series 9.  Suppurative flexor tenosynovitis is present to the midfoot.    Impression  Destructive septic arthritis/osteomyelitis is present to the 2nd and 3rd tarsal metatarsal joints with dorsal dislocation of the bases of the proximal phalanges and full-thickness tearing of the plantar plates.  The 2nd flexor tendon is largely torn.  The 3rd flexor tendon is dislocated medially.  A 3.5 cm x 2 cm abscess is present about the 3rd metatarsal head and likely represents decompressed septic arthritis.    Septic arthritis is likely present to the 1st metatarsal phalangeal joint.  Edema and trace enhancement seen to the distal phalanx of the great toe is equivocal..    Suppurative flexor tenosynovitis.  An ulcer is present to the plantar aspect of the forefoot with 4.5 cm of necrotic tissue present in the ulcer base.  This is nicely demonstrated on image 19 of series 9.    Myositis to the anterior intrinsic muscles.  Cellulitis is present to the forefoot.      Electronically signed by: Kenneth Madsen  Date:    12/19/2022  Time:    17:16    Current Medications:     Infusions:   sodium chloride 0.9% 125 mL/hr at 12/22/22 0953        Scheduled:   aspirin  81 mg Oral Daily    atorvastatin  40 mg Oral Daily    ceFAZolin  2 g Intravenous Once    collagenase   Topical (Top) Daily    divalproex  500 mg Oral Daily    DULoxetine  30 mg Oral BID    enoxaparin  40 mg Subcutaneous Daily    fluticasone furoate-vilanteroL  1 puff Inhalation Daily    gabapentin  600 mg Oral TID     insulin detemir U-100  20 Units Subcutaneous QHS    insulin detemir U-100  20 Units Subcutaneous Daily    miconazole   Topical (Top) BID        PRN:  albuterol-ipratropium, dextrose 10%, dextrose 10%, glucagon (human recombinant), glucose, glucose, HYDROcodone-acetaminophen, insulin aspart U-100, morphine, naloxone, sodium chloride 0.9%, sodium chloride 0.9%    Antibiotics and Day Number of Therapy:  Vancomycin and Zosyn - Day 5      Intake/Output Summary (Last 24 hours) at 12/23/2022 0541  Last data filed at 12/23/2022 0301  Gross per 24 hour   Intake 240 ml   Output 2600 ml   Net -2360 ml         Lines/Drains/Airways       Peripheral Intravenous Line  Duration                  Peripheral IV - Double Lumen 12/21/22 0150 20 G Left;Posterior Hand 2 days                  Assessment & Plan:   Diabetic Ulcers, Bilateral  Hx of Osteomyelitis of the Feet, Bilateral  Osteomyelitis and Septic arthritis, Left Foot S/P left BKA post-op day 1  Uncontrolled DMII  - HbA1c 11.3% at this time  - Has not taken insulin in over 1 year  - Worsening ulcers, left worse than right on plantar surfaces   - Strict glucose monitoring  - Started Long acting Levemir 20 units QHS, 20 units am - controlled apart from immediate post-operative period, 184 this AM  - Antibiotics stopped today. Source control established.   - CT foot without obvious osteomyelitis bilaterally  - MRI foot bilateral: Osteomyelitis, septic arthritis in left foot  - Surgery on board; BKA done 12/21/2022  - PRN Pain medications in place  - Gabapentin 600mg on board for diabetic neuropathy  - Wound care consulted  - Ordred PT and OT       Mild IMAN likely from glucosuric losses - resolved  - Renal indices improved      Chronic airway disease, likely COPD no PFTs at this time  Hx of Tobacco Use   - DuoNebs scheduled q6h PRN  - Improved        CODE STATUS: Full Code  Access: Peripheral  Antibiotics: None  Diet: Diabetic  DVT Prophylaxis: Lovenox  GI Prophylaxis: none  needed  Fluids: None      Disposition: Day 6 of admission for s/p BKA post op day-2, some pain but overall doing well, sugars controlled, future plans for placement include SNF vs. Home Health with PT    Con Anaya MD  [employer] [facility] PGY-1

## 2022-12-23 NOTE — PT/OT/SLP PROGRESS
Physical Therapy Treatment    Patient Name:  Con Arnold   MRN:  [id number]    Recommendations:     Discharge Recommendations:  rehabilitation facility   Discharge Equipment Recommendations: walker, rolling   Barriers to discharge: Severity of deficits, Inaccessible home, Decreased caregiver support, Safety Awareness  , Level of Skilled Assistance Needed, and Endurance    Assessment:     Con Arnold is a 62 y.o. male admitted with a medical diagnosis of Type 2 diabetes mellitus with left diabetic foot ulcer.    1. Uncontrolled type 2 diabetes mellitus with hyperglycemia    2. Diabetic ulcer of foot associated with diabetes mellitus due to underlying condition, limited to breakdown of skin, unspecified laterality, unspecified part of foot    3. Cough    4. Type 2 diabetes mellitus with left diabetic foot ulcer    5. S/P BKA (below knee amputation) unilateral, left    Pt found semisupine requiring encouragement to participate. Pt c/o pain and recent pain medication  He presents with the following impairments/functional limitations:  gait instability, impaired endurance, impaired balance, decreased lower extremity function, pain, impaired skin, edema .    Rehab Prognosis: Good; patient would benefit from acute skilled PT services to address these deficits and reach maximum level of function.    Recent Surgery: Procedure(s) (LRB):  AMPUTATION, BELOW KNEE (Left) 2 Days Post-Op    Plan:     During this hospitalization, patient to be seen  (3-5 x week) to address the identified rehab impairments via gait training, therapeutic activities, therapeutic exercises, wheelchair management/training and progress toward the following goals:    Plan of Care Expires:  01/21/23    Subjective     Chief Complaint: phantom pains  Patient/Family Comments/goals: none stated  Pain/Comfort:  Pain Rating 1: 9/10  Location - Side 1: Left  Location 1: leg  Pain Addressed 1: Reposition, Cessation of Activity  Pain Rating Post-Intervention 1:  "9/10      Objective:     Communicated with nurse archibald prior to session.  Patient found supine with peripheral IV upon PT entry to room.     General Precautions: Standard, fall, vision impaired   Orthopedic Precautions:N/A   Braces: N/A  Respiratory Status: Room air     Functional Mobility:  Bed Mobility:     Supine to Sit: modified independence  Sit to Supine: modified independence  Transfers:     Sit to Stand:  minimum assistance with rolling walker and elevated bed  Gait: .Patient ambulated 20ft with Rolling Walker and minimal assistance using swing to. Patient demonstrated decreased rudy and decreased step length during gait due to impaired balance, pain, and impaired postural control.            Therapeutic Activities and Exercises:   PT donned immobilizer to left left. Immobilizer sizes were limited. 20" placed on pt however residual limb too swollen for straps to be strapped. Ed given on knee extension. CNA and nurse notified of fit and to remove if necessary.     Patient left supine with all lines intact, call button in reach, and nurse / notified..    GOALS:   Multidisciplinary Problems       Physical Therapy Goals          Problem: Physical Therapy    Goal Priority Disciplines Outcome Goal Variances Interventions   Physical Therapy Goal     PT, PT/OT Ongoing, Progressing     Description: Goals to be met by: discharge     Patient will increase functional independence with mobility by performin. Supine to sit with Modified [place]  2. Sit to supine with Modified [place]  3. Sit to stand transfer with Stand-by Assistance  4. Gait  x 50 feet with Minimal Assistance using Rolling Walker.   5. Wheelchair propulsion x130 feet with Supervision using bilateral uppper extremities                         Time Tracking:     PT Received On: 22  PT Start Time: 1319     PT Stop Time: 1351  PT Total Time (min): 32 min     Billable Minutes: Gait Training 22 and Therapeutic Activity " 10    Treatment Type: Treatment  PT/PTA: PT           12/23/2022

## 2022-12-24 LAB
ALBUMIN SERPL-MCNC: 2.7 G/DL (ref 3.4–4.8)
ALBUMIN/GLOB SERPL: 0.7 RATIO (ref 1.1–2)
ALP SERPL-CCNC: 66 UNIT/L (ref 40–150)
ALT SERPL-CCNC: 25 UNIT/L (ref 0–55)
AST SERPL-CCNC: 36 UNIT/L (ref 5–34)
BASOPHILS # BLD AUTO: 0.05 X10(3)/MCL (ref 0–0.2)
BASOPHILS NFR BLD AUTO: 0.8 %
BILIRUBIN DIRECT+TOT PNL SERPL-MCNC: 0.5 MG/DL
BUN SERPL-MCNC: 15.3 MG/DL (ref 8.4–25.7)
CALCIUM SERPL-MCNC: 9.6 MG/DL (ref 8.8–10)
CHLORIDE SERPL-SCNC: 99 MMOL/L (ref 98–107)
CO2 SERPL-SCNC: 29 MMOL/L (ref 23–31)
CREAT SERPL-MCNC: 0.73 MG/DL (ref 0.73–1.18)
EOSINOPHIL # BLD AUTO: 0.09 X10(3)/MCL (ref 0–0.9)
EOSINOPHIL NFR BLD AUTO: 1.4 %
ERYTHROCYTE [DISTWIDTH] IN BLOOD BY AUTOMATED COUNT: 13.2 % (ref 11.6–14.4)
GFR SERPLBLD CREATININE-BSD FMLA CKD-EPI: >60 MLS/MIN/1.73/M2
GLOBULIN SER-MCNC: 4.1 GM/DL (ref 2.4–3.5)
GLUCOSE SERPL-MCNC: 103 MG/DL (ref 82–115)
HCT VFR BLD AUTO: 40.6 % (ref 42–52)
HGB BLD-MCNC: 13.9 GM/DL (ref 14–18)
IMM GRANULOCYTES # BLD AUTO: 0.14 X10(3)/MCL (ref 0–0.04)
IMM GRANULOCYTES NFR BLD AUTO: 2.1 %
LYMPHOCYTES # BLD AUTO: 1.6 X10(3)/MCL (ref 0.6–4.6)
LYMPHOCYTES NFR BLD AUTO: 24.4 %
MCH RBC QN AUTO: 33 PG
MCHC RBC AUTO-ENTMCNC: 34.2 MG/DL (ref 33–36)
MCV RBC AUTO: 96.4 FL (ref 80–94)
MONOCYTES # BLD AUTO: 0.72 X10(3)/MCL (ref 0.1–1.3)
MONOCYTES NFR BLD AUTO: 11 %
NEUTROPHILS # BLD AUTO: 3.95 X10(3)/MCL (ref 2.1–9.2)
NEUTROPHILS NFR BLD AUTO: 60.3 %
NRBC BLD AUTO-RTO: 0 % (ref 0–1)
PLATELET # BLD AUTO: 161 X10(3)/MCL (ref 140–371)
PMV BLD AUTO: 11.5 FL (ref 9.4–12.4)
POCT GLUCOSE: 171 MG/DL (ref 70–110)
POCT GLUCOSE: 175 MG/DL (ref 70–110)
POCT GLUCOSE: 219 MG/DL (ref 70–110)
POCT GLUCOSE: 221 MG/DL (ref 70–110)
POTASSIUM SERPL-SCNC: 4.1 MMOL/L (ref 3.5–5.1)
PROT SERPL-MCNC: 6.8 GM/DL (ref 5.8–7.6)
RBC # BLD AUTO: 4.21 X10(6)/MCL (ref 4.7–6.1)
SODIUM SERPL-SCNC: 137 MMOL/L (ref 136–145)
WBC # SPEC AUTO: 6.6 X10(3)/MCL (ref 4.5–11.5)

## 2022-12-24 PROCEDURE — 80053 COMPREHEN METABOLIC PANEL: CPT

## 2022-12-24 PROCEDURE — 94640 AIRWAY INHALATION TREATMENT: CPT

## 2022-12-24 PROCEDURE — 36415 COLL VENOUS BLD VENIPUNCTURE: CPT

## 2022-12-24 PROCEDURE — 25000242 PHARM REV CODE 250 ALT 637 W/ HCPCS

## 2022-12-24 PROCEDURE — 85025 COMPLETE CBC W/AUTO DIFF WBC: CPT

## 2022-12-24 PROCEDURE — 25000003 PHARM REV CODE 250

## 2022-12-24 PROCEDURE — 94761 N-INVAS EAR/PLS OXIMETRY MLT: CPT

## 2022-12-24 PROCEDURE — 11000001 HC ACUTE MED/SURG PRIVATE ROOM

## 2022-12-24 PROCEDURE — 63600175 PHARM REV CODE 636 W HCPCS

## 2022-12-24 PROCEDURE — 63600175 PHARM REV CODE 636 W HCPCS: Performed by: STUDENT IN AN ORGANIZED HEALTH CARE EDUCATION/TRAINING PROGRAM

## 2022-12-24 PROCEDURE — 99900035 HC TECH TIME PER 15 MIN (STAT)

## 2022-12-24 RX ADMIN — MICONAZOLE NITRATE: 20 CREAM TOPICAL at 09:12

## 2022-12-24 RX ADMIN — DULOXETINE 30 MG: 30 CAPSULE, DELAYED RELEASE ORAL at 09:12

## 2022-12-24 RX ADMIN — INSULIN DETEMIR 25 UNITS: 100 INJECTION, SOLUTION SUBCUTANEOUS at 09:12

## 2022-12-24 RX ADMIN — INSULIN DETEMIR 30 UNITS: 100 INJECTION, SOLUTION SUBCUTANEOUS at 09:12

## 2022-12-24 RX ADMIN — GABAPENTIN 600 MG: 300 CAPSULE ORAL at 02:12

## 2022-12-24 RX ADMIN — HYDROCODONE BITARTRATE AND ACETAMINOPHEN 1 TABLET: 5; 325 TABLET ORAL at 09:12

## 2022-12-24 RX ADMIN — GABAPENTIN 600 MG: 300 CAPSULE ORAL at 09:12

## 2022-12-24 RX ADMIN — ATORVASTATIN CALCIUM 40 MG: 20 TABLET, FILM COATED ORAL at 09:12

## 2022-12-24 RX ADMIN — INSULIN ASPART 2 UNITS: 100 INJECTION, SOLUTION INTRAVENOUS; SUBCUTANEOUS at 12:12

## 2022-12-24 RX ADMIN — DIVALPROEX SODIUM 500 MG: 500 TABLET, FILM COATED, EXTENDED RELEASE ORAL at 09:12

## 2022-12-24 RX ADMIN — IPRATROPIUM BROMIDE AND ALBUTEROL SULFATE 3 ML: 2.5; .5 SOLUTION RESPIRATORY (INHALATION) at 11:12

## 2022-12-24 RX ADMIN — ENOXAPARIN SODIUM 40 MG: 40 INJECTION SUBCUTANEOUS at 04:12

## 2022-12-24 RX ADMIN — INSULIN ASPART 2 UNITS: 100 INJECTION, SOLUTION INTRAVENOUS; SUBCUTANEOUS at 09:12

## 2022-12-24 RX ADMIN — HYDROCODONE BITARTRATE AND ACETAMINOPHEN 1 TABLET: 5; 325 TABLET ORAL at 02:12

## 2022-12-24 RX ADMIN — IPRATROPIUM BROMIDE AND ALBUTEROL SULFATE 3 ML: 2.5; .5 SOLUTION RESPIRATORY (INHALATION) at 07:12

## 2022-12-24 RX ADMIN — COLLAGENASE SANTYL: 250 OINTMENT TOPICAL at 09:12

## 2022-12-24 RX ADMIN — FLUTICASONE FUROATE AND VILANTEROL TRIFENATATE 1 PUFF: 100; 25 POWDER RESPIRATORY (INHALATION) at 07:12

## 2022-12-24 RX ADMIN — MORPHINE SULFATE 2 MG: 2 INJECTION, SOLUTION INTRAMUSCULAR; INTRAVENOUS at 04:12

## 2022-12-24 RX ADMIN — ASPIRIN 81 MG CHEWABLE TABLET 81 MG: 81 TABLET CHEWABLE at 09:12

## 2022-12-24 RX ADMIN — INSULIN ASPART 4 UNITS: 100 INJECTION, SOLUTION INTRAVENOUS; SUBCUTANEOUS at 05:12

## 2022-12-24 RX ADMIN — MORPHINE SULFATE 2 MG: 2 INJECTION, SOLUTION INTRAMUSCULAR; INTRAVENOUS at 01:12

## 2022-12-24 NOTE — PLAN OF CARE
Problem: Adult Inpatient Plan of Care  Goal: Plan of Care Review  Outcome: Ongoing, Progressing  Goal: Patient-Specific Goal (Individualized)  Outcome: Ongoing, Progressing  Goal: Absence of Hospital-Acquired Illness or Injury  Outcome: Ongoing, Progressing  Goal: Optimal Comfort and Wellbeing  Outcome: Ongoing, Progressing  Goal: Readiness for Transition of Care  Outcome: Ongoing, Progressing     Problem: Impaired Wound Healing  Goal: Optimal Wound Healing  Outcome: Ongoing, Progressing     Problem: Infection  Goal: Absence of Infection Signs and Symptoms  Outcome: Ongoing, Progressing     Problem: Diabetes Comorbidity  Goal: Blood Glucose Level Within Targeted Range  Outcome: Ongoing, Progressing     Problem: Skin Injury Risk Increased  Goal: Skin Health and Integrity  Outcome: Ongoing, Progressing     Problem: Fall Injury Risk  Goal: Absence of Fall and Fall-Related Injury  Outcome: Ongoing, Progressing

## 2022-12-24 NOTE — PROGRESS NOTES
[facility] Medicine Wards   Progress Note     Resident Team: [facility] Medicine List 1  Attending Physician: Evie Burger MD  Resident: Rl Kang MD  Intern: Con Anaya MD     Hospital Length of Stay: 7 days    Subjective:      Brief HPI:  Con Arnold is a 62 y.o. male who with a history of chronic osteomyelitis of the foot bilaterally on suppresive doxycycline, htn, uncontrolled DMII, who presented to [facility] ED on 12/16/2022  with complaint of bilateral foot pain, ulcers.     Patient has hx of osteomyelitis of the foot with numerous admissions for antibiotic treatment. Patients was followed in ID and was on suppressive doxycycline to which he has not taken in 2 months. He states he has run out of supplies for cleaning his foot ulcers, and states he is homeless. Patient was admitted in [place] where he received 1 month of antibiotics in July, and then another month in august in [place] where he had a toe and metatarsals removed from his right foot. He states that it helped however he has recently moved back. His feet are worse over the past month, he has pain though still is able to ambulate. He is with his son and they are trying to get housing although they have been running into difficulties. Patient sought consult due to unbearable pain, will admit for abx, wound assessment and treatment, and blood sugar control.     Of note he has not had insulin in over a year.    Interval History:Patient is post op day 3 of BKA. AMBIKAS. BRODY. Patient is doing well. Will require leg prosthesis fitting. No febrile episodes.    Review of Systems   Constitutional:  Negative for chills, fever and weight loss.   Respiratory:  Negative for cough and wheezing.    Cardiovascular:  Negative for chest pain and palpitations.   Gastrointestinal:  Negative for abdominal pain, nausea and vomiting.   Musculoskeletal:  Negative for myalgias.        Phantom limb pain left foot   Neurological:  Negative for focal weakness and headaches.         Objective:     Vital Signs (Most Recent):  Temp: 98.2 °F (36.8 °C) (12/24/22 0401)  Pulse: 70 (12/24/22 0401)  Resp: 20 (12/24/22 0401)  BP: (!) 146/80 (12/24/22 0401)  SpO2: 96 % (12/24/22 0401)   Vital Signs (24h Range):  Temp:  [97.8 °F (36.6 °C)-98.4 °F (36.9 °C)] 98.2 °F (36.8 °C)  Pulse:  [67-78] 70  Resp:  [17-20] 20  SpO2:  [93 %-97 %] 96 %  BP: (133-155)/(80-85) 146/80     Physical Examination:  Constitutional:       General: He is not in acute distress.     Appearance: Normal appearance. He is not ill-appearing.   HENT:      Head: Normocephalic and atraumatic.      Nose: Nose normal.   Eyes:      Extraocular Movements: Extraocular movements intact.      Conjunctiva/sclera: Conjunctivae normal.      Pupils: Pupils are equal, round, and reactive to light.   Cardiovascular:      Rate and Rhythm: Normal rate and regular rhythm.   Pulmonary:      Effort: Pulmonary effort is normal. No respiratory distress.      Breath sounds: CTAB     Comments: Breath sounds distant, no wheezing  Abdominal:      General: Abdomen is flat.      Palpations: Abdomen is soft.   Musculoskeletal:         General: Left leg, BKA, dressing non soaked on stump, with right foot dressed present. Normal range of motion.   Neurological:      Mental Status: He is alert.      Sensory: Sensory deficit present.   Psychiatric:         Mood and Affect: Mood normal.         Behavior: Behavior normal.      Laboratory:  Most Recent Data:  CBC:   Recent Labs   Lab 12/23/22  0351 12/24/22  0454   WBC 7.5 6.6   HGB 12.7* 13.9*   HCT 37.3* 40.6*    161       CMP:   Recent Labs   Lab 12/24/22  0454   CALCIUM 9.6   ALBUMIN 2.7*      K 4.1   CO2 29   BUN 15.3   CREATININE 0.73   ALKPHOS 66   ALT 25   AST 36*   BILITOT 0.5         Radiology:  Imaging Results              CT Foot W W/O Contrast Bilateral (Final result)  Result time 12/17/22 10:04:45      Final result by Ayse Martinez MD (12/17/22 10:04:45)                   Impression:       1. No definite acute fracture or acute erosive bone changes.  2. Chronic appearing changes at the left 2nd MTP joint.  3. Bilateral plantar surface soft tissue ulcerations and blisters.  Otherwise no deep drainable fluid collection.  4. 3 mm radiopaque foreign body in the plantar surface underlying the right 1st metatarsal head.      Electronically signed by: Ayse Martinez  Date:    12/17/2022  Time:    10:04               Narrative:    EXAMINATION:  CT FOOT W W/O CONTRAST BILATERAL    CLINICAL HISTORY:  Foot pain, chronic, osseous injury suspected;Diabetic Foot, r/o osteo;    TECHNIQUE:  CT images of the bilateral feet with and without contrast.  Axial, coronal, and sagittal reformatted images were obtained. Dose length product is 143 mGycm. Automatic exposure control, adjustment of mA/kV or iterative reconstruction technique was used to limit radiation dose.    COMPARISON:  X-rays dated 12/16/2022    FINDINGS:  Right foot:    There is no acute fracture identified.  There are no definite acute erosive bone changes.    There is a soft tissue ulceration along the plantar surface underlying the 3rd metatarsal head, with blister at the skin surface measuring 5 x 23 mm in size (series 14, image 32).  There is otherwise no drainable fluid collection.  There is a 3 mm radiopaque foreign body in the plantar surface underlying the 1st metatarsal head.    Left foot:    There is no definite acute fracture identified.  There is dorsal subluxation at the 2nd and 3rd MTP joints.  There are chronic appearing changes at the 2nd MTP joint.  There are no definite acute erosive changes identified.    There is plantar soft tissue ulceration with blister at the skin surface underlying the 2nd and 3rd metatarsal heads.  There is otherwise no drainable fluid collection identified.  There is no radiopaque foreign body.                                       X-Ray Foot Complete Right (Final result)  Result time 12/17/22 10:43:42       Final result by Yoly Mendez MD (12/17/22 10:43:42)                   Impression:      No change since prior      Electronically signed by: Yoly Mendez  Date:    12/17/2022  Time:    10:43               Narrative:    EXAMINATION:  XR FOOT COMPLETE 3 VIEW RIGHT    CLINICAL HISTORY:  . Diabetes mellitus due to underlying condition with foot ulcer    TECHNIQUE:  AP, lateral, and oblique views of the right foot were performed.    COMPARISON:  11/11/2022    FINDINGS:  Patient is status post amputation of the distal 2nd metatarsal.  There is a radiopaque foreign body seen at the base of the 1st toe.  It is unchanged since prior examination.  No fracture seen.  No dislocation is seen.                                       X-Ray Foot Complete Left (Final result)  Result time 12/17/22 10:36:22      Final result by Audi Giles MD (12/17/22 10:36:22)                   Impression:      Degenerative changes.    Dislocations slight subluxation of the 2nd and 3rd metatarsophalangeal joints.    Soft tissue ulceration.    No clear evidence of osteomyelitis.      Electronically signed by: Audi Giles  Date:    12/17/2022  Time:    10:36               Narrative:    EXAMINATION:  XR FOOT COMPLETE 3 VIEW LEFT    CLINICAL HISTORY:  Diabetes mellitus due to underlying condition with foot ulcer    COMPARISON:  None.    FINDINGS:  No acute displaced fractures or dislocations.    There are some degenerative changes of the proximal and distal interphalangeal joints with a slight hilus valgus deformity    There are persistent dislocations/subluxations involving the 2nd and 3rd metatarsophalangeal joints which appears to be unchanged as compared with the previous exam    There might be soft tissue disruption indicating the presence of an ulceration, however, on the provided images there is no evidence of primary or secondary signs to suggest the presence of osteomyelitis other imaging modalities might prove helpful  for further assessment                                       X-Ray Chest PA And Lateral (Final result)  Result time 12/17/22 10:28:04      Final result by Audi Giles MD (12/17/22 10:28:04)                   Impression:      No acute chest disease is identified.      Electronically signed by: Audi Giles  Date:    12/17/2022  Time:    10:28               Narrative:    EXAMINATION:  XR CHEST PA AND LATERAL    CLINICAL HISTORY:  , Cough, unspecified.    COMPARISON:  October 26, 2021    FINDINGS:  No alveolar consolidation, effusion, or pneumothorax is seen.   The thoracic aorta is normal  cardiac silhouette, central pulmonary vessels and mediastinum are normal in size and are grossly unremarkable.   visualized osseous structures are grossly unremarkable.                                      MRI Foot (Forefoot) Left W W/O Contrast 12/19/2022    Narrative  EXAMINATION:  MRI FOOT (FOREFOOT) LEFT W W/O CONTRAST    CLINICAL HISTORY:  Foot swelling, diabetic, osteomyelitis suspected, xray done;    TECHNIQUE:  Enhanced and unenhanced, multiplanar, multisequence MR imaging of the left foot was obtained.    COMPARISON:  MRI contralateral foot used for comparison dated 12/19/2022.  CT scan used for comparison dated 12/17/2022    FINDINGS:  Incongruent hallux valgus deformity.  Trabecular edema present to the distal phalanx of the great toe is equivocal.  Trabecular edema and an effusion present to the 1st metatarsophalangeal joint may represent early osteomyelitis.  Destructive osteomyelitis and septic arthritis are present to the 2nd and 3rd metatarsophalangeal joints with dorsal dislocation of the bases of the proximal phalanges and full-thickness tears of the plantar plates.  The flexor tendon of the 2nd toe is largely torn.  The 3rd flexor tendon is medially dislocated but appears structurally intact.  An abscess is present about the 3rd metatarsal head and measures 3.5 cm x 2 cm in size.  It likely represents  decompressed septic arthritis.    Myositis is present to the anterior intrinsic muscles.  An ulcer is present to the plantar aspect of the forefoot with necrotic tissue present in the ulcer base.  This measures approximately 4.5 cm x 3 cm and is nicely demonstrated on image 19 of series 9.  Suppurative flexor tenosynovitis is present to the midfoot.    Impression  Destructive septic arthritis/osteomyelitis is present to the 2nd and 3rd tarsal metatarsal joints with dorsal dislocation of the bases of the proximal phalanges and full-thickness tearing of the plantar plates.  The 2nd flexor tendon is largely torn.  The 3rd flexor tendon is dislocated medially.  A 3.5 cm x 2 cm abscess is present about the 3rd metatarsal head and likely represents decompressed septic arthritis.    Septic arthritis is likely present to the 1st metatarsal phalangeal joint.  Edema and trace enhancement seen to the distal phalanx of the great toe is equivocal..    Suppurative flexor tenosynovitis.  An ulcer is present to the plantar aspect of the forefoot with 4.5 cm of necrotic tissue present in the ulcer base.  This is nicely demonstrated on image 19 of series 9.    Myositis to the anterior intrinsic muscles.  Cellulitis is present to the forefoot.      Electronically signed by: Kenneth Madsen  Date:    12/19/2022  Time:    17:16    Current Medications:     Infusions:         Scheduled:   aspirin  81 mg Oral Daily    atorvastatin  40 mg Oral Daily    ceFAZolin  2 g Intravenous Once    collagenase   Topical (Top) Daily    divalproex  500 mg Oral Daily    DULoxetine  30 mg Oral BID    enoxaparin  40 mg Subcutaneous Daily    fluticasone furoate-vilanteroL  1 puff Inhalation Daily    gabapentin  600 mg Oral TID    insulin detemir U-100  20 Units Subcutaneous QHS    insulin detemir U-100  20 Units Subcutaneous Daily    miconazole   Topical (Top) BID        PRN:  albuterol-ipratropium, dextrose 10%, dextrose 10%, glucagon (human recombinant),  glucose, glucose, HYDROcodone-acetaminophen, insulin aspart U-100, morphine, naloxone, sodium chloride 0.9%, sodium chloride 0.9%    Antibiotics and Day Number of Therapy:  Vancomycin and Zosyn - Day 5      Intake/Output Summary (Last 24 hours) at 12/24/2022 0724  Last data filed at 12/24/2022 0401  Gross per 24 hour   Intake --   Output 2850 ml   Net -2850 ml         Lines/Drains/Airways       Peripheral Intravenous Line  Duration                  Peripheral IV - Single Lumen 12/23/22 2124 20 G Anterior;Right Forearm <1 day                  Assessment & Plan:   Diabetic Ulcers, Bilateral  Hx of Osteomyelitis of the Feet, Bilateral  Osteomyelitis and Septic arthritis, Left Foot S/P left BKA post-op day 1  Uncontrolled DMII  - HbA1c 11.3% at this time  - Has not taken insulin in over 1 year  - Worsening ulcers, left worse than right on plantar surfaces   - Strict glucose monitoring  - Started Long acting Levemir 20 units QHS, will increase 25 units am - controlled apart from immediate post-operative period, 184 this AM  - Antibiotics stopped today. Source control established.   - CT foot without obvious osteomyelitis bilaterally  - MRI foot bilateral: Osteomyelitis, septic arthritis in left foot  - Surgery on board; BKA done 12/21/2022  - PRN Pain medications in place  - Gabapentin 600mg on board for diabetic neuropathy  - Wound care consulted  - [facility] PT and OT       Mild IMAN likely from glucosuric losses - resolved  - Renal indices improved      Chronic airway disease, likely COPD no PFTs at this time  Hx of Tobacco Use   - DuoNebs scheduled q6h PRN  - Improved        CODE STATUS: Full Code  Access: Peripheral  Antibiotics: None  Diet: Diabetic  DVT Prophylaxis: Lovenox  GI Prophylaxis: none needed  Fluids: None      Disposition: Day 7 of admission for s/p BKA post op day-3, sugars controlled, future plans for placement include SNF vs. Home Health with PT, needs evaluation for prosthesis    Con Anaya,  MD [facility] PGY-1

## 2022-12-25 LAB
ALBUMIN SERPL-MCNC: 2.7 G/DL (ref 3.4–4.8)
ALBUMIN/GLOB SERPL: 0.6 RATIO (ref 1.1–2)
ALP SERPL-CCNC: 68 UNIT/L (ref 40–150)
ALT SERPL-CCNC: 22 UNIT/L (ref 0–55)
AST SERPL-CCNC: 30 UNIT/L (ref 5–34)
BASOPHILS # BLD AUTO: 0.06 X10(3)/MCL (ref 0–0.2)
BASOPHILS NFR BLD AUTO: 1 %
BILIRUBIN DIRECT+TOT PNL SERPL-MCNC: 0.5 MG/DL
BUN SERPL-MCNC: 20.1 MG/DL (ref 8.4–25.7)
CALCIUM SERPL-MCNC: 9.9 MG/DL (ref 8.8–10)
CHLORIDE SERPL-SCNC: 99 MMOL/L (ref 98–107)
CO2 SERPL-SCNC: 29 MMOL/L (ref 23–31)
CREAT SERPL-MCNC: 0.74 MG/DL (ref 0.73–1.18)
EOSINOPHIL # BLD AUTO: 0.14 X10(3)/MCL (ref 0–0.9)
EOSINOPHIL NFR BLD AUTO: 2.2 %
ERYTHROCYTE [DISTWIDTH] IN BLOOD BY AUTOMATED COUNT: 13 % (ref 11.6–14.4)
GFR SERPLBLD CREATININE-BSD FMLA CKD-EPI: >60 MLS/MIN/1.73/M2
GLOBULIN SER-MCNC: 4.2 GM/DL (ref 2.4–3.5)
GLUCOSE SERPL-MCNC: 100 MG/DL (ref 82–115)
HCT VFR BLD AUTO: 42.1 % (ref 42–52)
HGB BLD-MCNC: 14.4 GM/DL (ref 14–18)
IMM GRANULOCYTES # BLD AUTO: 0.13 X10(3)/MCL (ref 0–0.04)
IMM GRANULOCYTES NFR BLD AUTO: 2.1 %
LYMPHOCYTES # BLD AUTO: 1.69 X10(3)/MCL (ref 0.6–4.6)
LYMPHOCYTES NFR BLD AUTO: 26.9 %
MCH RBC QN AUTO: 32.9 PG
MCHC RBC AUTO-ENTMCNC: 34.2 MG/DL (ref 33–36)
MCV RBC AUTO: 96.1 FL (ref 80–94)
MONOCYTES # BLD AUTO: 0.79 X10(3)/MCL (ref 0.1–1.3)
MONOCYTES NFR BLD AUTO: 12.6 %
NEUTROPHILS # BLD AUTO: 3.48 X10(3)/MCL (ref 2.1–9.2)
NEUTROPHILS NFR BLD AUTO: 55.2 %
NRBC BLD AUTO-RTO: 0 % (ref 0–1)
PLATELET # BLD AUTO: 170 X10(3)/MCL (ref 140–371)
PMV BLD AUTO: 11.5 FL (ref 9.4–12.4)
POCT GLUCOSE: 216 MG/DL (ref 70–110)
POCT GLUCOSE: 239 MG/DL (ref 70–110)
POCT GLUCOSE: 255 MG/DL (ref 70–110)
POCT GLUCOSE: 82 MG/DL (ref 70–110)
POCT GLUCOSE: 94 MG/DL (ref 70–110)
POTASSIUM SERPL-SCNC: 4.1 MMOL/L (ref 3.5–5.1)
PROT SERPL-MCNC: 6.9 GM/DL (ref 5.8–7.6)
RBC # BLD AUTO: 4.38 X10(6)/MCL (ref 4.7–6.1)
SODIUM SERPL-SCNC: 138 MMOL/L (ref 136–145)
WBC # SPEC AUTO: 6.3 X10(3)/MCL (ref 4.5–11.5)

## 2022-12-25 PROCEDURE — 11000001 HC ACUTE MED/SURG PRIVATE ROOM

## 2022-12-25 PROCEDURE — 63600175 PHARM REV CODE 636 W HCPCS: Performed by: STUDENT IN AN ORGANIZED HEALTH CARE EDUCATION/TRAINING PROGRAM

## 2022-12-25 PROCEDURE — 27000221 HC OXYGEN, UP TO 24 HOURS

## 2022-12-25 PROCEDURE — 94761 N-INVAS EAR/PLS OXIMETRY MLT: CPT

## 2022-12-25 PROCEDURE — 36415 COLL VENOUS BLD VENIPUNCTURE: CPT

## 2022-12-25 PROCEDURE — 25000242 PHARM REV CODE 250 ALT 637 W/ HCPCS

## 2022-12-25 PROCEDURE — 85025 COMPLETE CBC W/AUTO DIFF WBC: CPT

## 2022-12-25 PROCEDURE — 94640 AIRWAY INHALATION TREATMENT: CPT

## 2022-12-25 PROCEDURE — 80053 COMPREHEN METABOLIC PANEL: CPT

## 2022-12-25 PROCEDURE — 25000003 PHARM REV CODE 250

## 2022-12-25 PROCEDURE — 99900035 HC TECH TIME PER 15 MIN (STAT)

## 2022-12-25 PROCEDURE — 63600175 PHARM REV CODE 636 W HCPCS

## 2022-12-25 RX ADMIN — DIVALPROEX SODIUM 500 MG: 500 TABLET, FILM COATED, EXTENDED RELEASE ORAL at 08:12

## 2022-12-25 RX ADMIN — IPRATROPIUM BROMIDE AND ALBUTEROL SULFATE 3 ML: 2.5; .5 SOLUTION RESPIRATORY (INHALATION) at 09:12

## 2022-12-25 RX ADMIN — DULOXETINE 30 MG: 30 CAPSULE, DELAYED RELEASE ORAL at 08:12

## 2022-12-25 RX ADMIN — INSULIN DETEMIR 30 UNITS: 100 INJECTION, SOLUTION SUBCUTANEOUS at 08:12

## 2022-12-25 RX ADMIN — INSULIN ASPART 3 UNITS: 100 INJECTION, SOLUTION INTRAVENOUS; SUBCUTANEOUS at 08:12

## 2022-12-25 RX ADMIN — ENOXAPARIN SODIUM 40 MG: 40 INJECTION SUBCUTANEOUS at 04:12

## 2022-12-25 RX ADMIN — GABAPENTIN 600 MG: 300 CAPSULE ORAL at 02:12

## 2022-12-25 RX ADMIN — GABAPENTIN 600 MG: 300 CAPSULE ORAL at 08:12

## 2022-12-25 RX ADMIN — ASPIRIN 81 MG CHEWABLE TABLET 81 MG: 81 TABLET CHEWABLE at 08:12

## 2022-12-25 RX ADMIN — MORPHINE SULFATE 2 MG: 2 INJECTION, SOLUTION INTRAMUSCULAR; INTRAVENOUS at 08:12

## 2022-12-25 RX ADMIN — MICONAZOLE NITRATE: 20 CREAM TOPICAL at 08:12

## 2022-12-25 RX ADMIN — HYDROCODONE BITARTRATE AND ACETAMINOPHEN 1 TABLET: 5; 325 TABLET ORAL at 02:12

## 2022-12-25 RX ADMIN — INSULIN ASPART 4 UNITS: 100 INJECTION, SOLUTION INTRAVENOUS; SUBCUTANEOUS at 12:12

## 2022-12-25 RX ADMIN — ATORVASTATIN CALCIUM 40 MG: 20 TABLET, FILM COATED ORAL at 08:12

## 2022-12-25 RX ADMIN — FLUTICASONE FUROATE AND VILANTEROL TRIFENATATE 1 PUFF: 100; 25 POWDER RESPIRATORY (INHALATION) at 09:12

## 2022-12-25 RX ADMIN — COLLAGENASE SANTYL: 250 OINTMENT TOPICAL at 08:12

## 2022-12-25 RX ADMIN — DULOXETINE 30 MG: 30 CAPSULE, DELAYED RELEASE ORAL at 09:12

## 2022-12-25 NOTE — PROGRESS NOTES
[facility] Medicine Wards   Progress Note     Resident Team: [facility] Medicine List 1  Attending Physician: Evie Burger MD  Resident: Rl Kang MD  Intern: Con Anaya MD     Hospital Length of Stay: 8 days    Subjective:      Brief HPI:  Con Arnold is a 62 y.o. male who with a history of chronic osteomyelitis of the foot bilaterally on suppresive doxycycline, htn, uncontrolled DMII, who presented to [facility] ED on 12/16/2022  with complaint of bilateral foot pain, ulcers.     Patient has hx of osteomyelitis of the foot with numerous admissions for antibiotic treatment. Patients was followed in [place] and was on suppressive doxycycline to which he has not taken in 2 months. He states he has run out of supplies for cleaning his foot ulcers, and states he is homeless. Patient was admitted in [place] where he received 1 month of antibiotics in July, and then another month in august in [place] where he had a toe and metatarsals removed from his right foot. He states that it helped however he has recently moved back. His feet are worse over the past month, he has pain though still is able to ambulate. He is with his son and they are trying to get housing although they have been running into difficulties. Patient sought consult due to unbearable pain, will admit for abx, wound assessment and treatment, and blood sugar control.     Of note he has not had insulin in over a year.    Interval History:Patient is post op day 4. of BKA. VSS. NAEON. Patient is doing well.  No hypoglycemic episodes.  We will note for controlled levels with increase in insulin this morning.  Pain still present though mostly phantom limb much better control versus postoperative day 1.  Review of Systems   Constitutional:  Negative for chills, fever and weight loss.   Respiratory:  Negative for cough and wheezing.    Cardiovascular:  Negative for chest pain and palpitations.   Gastrointestinal:  Negative for abdominal pain, nausea and vomiting.    Musculoskeletal:  Negative for myalgias.        Phantom limb pain left foot   Neurological:  Negative for focal weakness and headaches.        Objective:     Vital Signs (Most Recent):  Temp: 98.7 °F (37.1 °C) (12/25/22 0400)  Pulse: 64 (12/25/22 0400)  Resp: 20 (12/24/22 2109)  BP: 118/77 (12/25/22 0400)  SpO2: (!) 94 % (12/25/22 0400)   Vital Signs (24h Range):  Temp:  [97.9 °F (36.6 °C)-98.9 °F (37.2 °C)] 98.7 °F (37.1 °C)  Pulse:  [62-72] 64  Resp:  [16-20] 20  SpO2:  [90 %-95 %] 94 %  BP: (108-154)/(69-82) 118/77     Physical Examination:  Constitutional:       General: He is not in acute distress.     Appearance: Normal appearance. He is not ill-appearing.   HENT:      Head: Normocephalic and atraumatic.      Nose: Nose normal.   Eyes:      Extraocular Movements: Extraocular movements intact.      Conjunctiva/sclera: Conjunctivae normal.      Pupils: Pupils are equal, round, and reactive to light.   Cardiovascular:      Rate and Rhythm: Normal rate and regular rhythm.   Pulmonary:      Effort: Pulmonary effort is normal. No respiratory distress.      Breath sounds: CTAB     Comments: Breath sounds distant, no wheezing  Abdominal:      General: Abdomen is flat.      Palpations: Abdomen is soft.   Musculoskeletal:         General: Left leg, BKA, dressing non soaked on stump, with right foot dressed present. Normal range of motion.   Neurological:      Mental Status: He is alert.      Sensory: Sensory deficit present.   Psychiatric:         Mood and Affect: Mood normal.         Behavior: Behavior normal.      Laboratory:  Most Recent Data:  CBC:   Recent Labs   Lab 12/24/22  0454 12/25/22  0356   WBC 6.6 6.3   HGB 13.9* 14.4   HCT 40.6* 42.1    170       CMP:   Recent Labs   Lab 12/25/22  0356   CALCIUM 9.9   ALBUMIN 2.7*      K 4.1   CO2 29   BUN 20.1   CREATININE 0.74   ALKPHOS 68   ALT 22   AST 30   BILITOT 0.5         Radiology:  Imaging Results              CT Foot W W/O Contrast Bilateral  (Final result)  Result time 12/17/22 10:04:45      Final result by Ayse Martinez MD (12/17/22 10:04:45)                   Impression:      1. No definite acute fracture or acute erosive bone changes.  2. Chronic appearing changes at the left 2nd MTP joint.  3. Bilateral plantar surface soft tissue ulcerations and blisters.  Otherwise no deep drainable fluid collection.  4. 3 mm radiopaque foreign body in the plantar surface underlying the right 1st metatarsal head.      Electronically signed by: Ayse Martinez  Date:    12/17/2022  Time:    10:04               Narrative:    EXAMINATION:  CT FOOT W W/O CONTRAST BILATERAL    CLINICAL HISTORY:  Foot pain, chronic, osseous injury suspected;Diabetic Foot, r/o osteo;    TECHNIQUE:  CT images of the bilateral feet with and without contrast.  Axial, coronal, and sagittal reformatted images were obtained. Dose length product is 143 mGycm. Automatic exposure control, adjustment of mA/kV or iterative reconstruction technique was used to limit radiation dose.    COMPARISON:  X-rays dated 12/16/2022    FINDINGS:  Right foot:    There is no acute fracture identified.  There are no definite acute erosive bone changes.    There is a soft tissue ulceration along the plantar surface underlying the 3rd metatarsal head, with blister at the skin surface measuring 5 x 23 mm in size (series 14, image 32).  There is otherwise no drainable fluid collection.  There is a 3 mm radiopaque foreign body in the plantar surface underlying the 1st metatarsal head.    Left foot:    There is no definite acute fracture identified.  There is dorsal subluxation at the 2nd and 3rd MTP joints.  There are chronic appearing changes at the 2nd MTP joint.  There are no definite acute erosive changes identified.    There is plantar soft tissue ulceration with blister at the skin surface underlying the 2nd and 3rd metatarsal heads.  There is otherwise no drainable fluid collection identified.  There is  no radiopaque foreign body.                                       X-Ray Foot Complete Right (Final result)  Result time 12/17/22 10:43:42      Final result by Yoly Mendez MD (12/17/22 10:43:42)                   Impression:      No change since prior      Electronically signed by: Yoly Mendez  Date:    12/17/2022  Time:    10:43               Narrative:    EXAMINATION:  XR FOOT COMPLETE 3 VIEW RIGHT    CLINICAL HISTORY:  . Diabetes mellitus due to underlying condition with foot ulcer    TECHNIQUE:  AP, lateral, and oblique views of the right foot were performed.    COMPARISON:  11/11/2022    FINDINGS:  Patient is status post amputation of the distal 2nd metatarsal.  There is a radiopaque foreign body seen at the base of the 1st toe.  It is unchanged since prior examination.  No fracture seen.  No dislocation is seen.                                       X-Ray Foot Complete Left (Final result)  Result time 12/17/22 10:36:22      Final result by Audi Giles MD (12/17/22 10:36:22)                   Impression:      Degenerative changes.    Dislocations slight subluxation of the 2nd and 3rd metatarsophalangeal joints.    Soft tissue ulceration.    No clear evidence of osteomyelitis.      Electronically signed by: Audi Giles  Date:    12/17/2022  Time:    10:36               Narrative:    EXAMINATION:  XR FOOT COMPLETE 3 VIEW LEFT    CLINICAL HISTORY:  Diabetes mellitus due to underlying condition with foot ulcer    COMPARISON:  None.    FINDINGS:  No acute displaced fractures or dislocations.    There are some degenerative changes of the proximal and distal interphalangeal joints with a slight hilus valgus deformity    There are persistent dislocations/subluxations involving the 2nd and 3rd metatarsophalangeal joints which appears to be unchanged as compared with the previous exam    There might be soft tissue disruption indicating the presence of an ulceration, however, on the provided  images there is no evidence of primary or secondary signs to suggest the presence of osteomyelitis other imaging modalities might prove helpful for further assessment                                       X-Ray Chest PA And Lateral (Final result)  Result time 12/17/22 10:28:04      Final result by Audi Giles MD (12/17/22 10:28:04)                   Impression:      No acute chest disease is identified.      Electronically signed by: Audi Giles  Date:    12/17/2022  Time:    10:28               Narrative:    EXAMINATION:  XR CHEST PA AND LATERAL    CLINICAL HISTORY:  , Cough, unspecified.    COMPARISON:  October 26, 2021    FINDINGS:  No alveolar consolidation, effusion, or pneumothorax is seen.   The thoracic aorta is normal  cardiac silhouette, central pulmonary vessels and mediastinum are normal in size and are grossly unremarkable.   visualized osseous structures are grossly unremarkable.                                      MRI Foot (Forefoot) Left W W/O Contrast 12/19/2022    Narrative  EXAMINATION:  MRI FOOT (FOREFOOT) LEFT W W/O CONTRAST    CLINICAL HISTORY:  Foot swelling, diabetic, osteomyelitis suspected, xray done;    TECHNIQUE:  Enhanced and unenhanced, multiplanar, multisequence MR imaging of the left foot was obtained.    COMPARISON:  MRI contralateral foot used for comparison dated 12/19/2022.  CT scan used for comparison dated 12/17/2022    FINDINGS:  Incongruent hallux valgus deformity.  Trabecular edema present to the distal phalanx of the great toe is equivocal.  Trabecular edema and an effusion present to the 1st metatarsophalangeal joint may represent early osteomyelitis.  Destructive osteomyelitis and septic arthritis are present to the 2nd and 3rd metatarsophalangeal joints with dorsal dislocation of the bases of the proximal phalanges and full-thickness tears of the plantar plates.  The flexor tendon of the 2nd toe is largely torn.  The 3rd flexor tendon is medially dislocated  but appears structurally intact.  An abscess is present about the 3rd metatarsal head and measures 3.5 cm x 2 cm in size.  It likely represents decompressed septic arthritis.    Myositis is present to the anterior intrinsic muscles.  An ulcer is present to the plantar aspect of the forefoot with necrotic tissue present in the ulcer base.  This measures approximately 4.5 cm x 3 cm and is nicely demonstrated on image 19 of series 9.  Suppurative flexor tenosynovitis is present to the midfoot.    Impression  Destructive septic arthritis/osteomyelitis is present to the 2nd and 3rd tarsal metatarsal joints with dorsal dislocation of the bases of the proximal phalanges and full-thickness tearing of the plantar plates.  The 2nd flexor tendon is largely torn.  The 3rd flexor tendon is dislocated medially.  A 3.5 cm x 2 cm abscess is present about the 3rd metatarsal head and likely represents decompressed septic arthritis.    Septic arthritis is likely present to the 1st metatarsal phalangeal joint.  Edema and trace enhancement seen to the distal phalanx of the great toe is equivocal..    Suppurative flexor tenosynovitis.  An ulcer is present to the plantar aspect of the forefoot with 4.5 cm of necrotic tissue present in the ulcer base.  This is nicely demonstrated on image 19 of series 9.    Myositis to the anterior intrinsic muscles.  Cellulitis is present to the forefoot.      Electronically signed by: Kenneth Madsen  Date:    12/19/2022  Time:    17:16    Current Medications:     Infusions:         Scheduled:   aspirin  81 mg Oral Daily    atorvastatin  40 mg Oral Daily    ceFAZolin  2 g Intravenous Once    collagenase   Topical (Top) Daily    divalproex  500 mg Oral Daily    DULoxetine  30 mg Oral BID    enoxaparin  40 mg Subcutaneous Daily    fluticasone furoate-vilanteroL  1 puff Inhalation Daily    gabapentin  600 mg Oral TID    insulin detemir U-100  30 Units Subcutaneous QHS    insulin detemir U-100  30 Units  Subcutaneous Daily    miconazole   Topical (Top) BID        PRN:  albuterol-ipratropium, dextrose 10%, dextrose 10%, glucagon (human recombinant), glucose, glucose, HYDROcodone-acetaminophen, insulin aspart U-100, morphine, naloxone, sodium chloride 0.9%, sodium chloride 0.9%    Antibiotics and Day Number of Therapy:  Vancomycin and Zosyn - Day 5      Intake/Output Summary (Last 24 hours) at 12/25/2022 0724  Last data filed at 12/25/2022 0503  Gross per 24 hour   Intake 120 ml   Output 700 ml   Net -580 ml         Lines/Drains/Airways       Peripheral Intravenous Line  Duration                  Peripheral IV - Single Lumen 12/23/22 2124 20 G Anterior;Right Forearm 1 day                  Assessment & Plan:   Diabetic Ulcers, Bilateral  Hx of Osteomyelitis of the Feet, Bilateral  Osteomyelitis and Septic arthritis, Left Foot S/P left BKA post-op day 1  Uncontrolled DMII  - HbA1c 11.3% at this time  - Has not taken insulin in over 1 year  - Worsening ulcers, left worse than right on plantar surfaces   - Strict glucose monitoring  - Increased to 30 units twice a day, a.m. and nightly, morning insulin at 89 mg/dL,   - Antibiotics stopped today. Source control established.   - CT foot without obvious osteomyelitis bilaterally  - MRI foot bilateral: Osteomyelitis, septic arthritis in left foot  - Surgery on board; BKA done 12/21/2022  - PRN Pain medications in place  - Gabapentin 600mg on board for diabetic neuropathy  - Wound care consulted  - [facility] PT and OT       Mild IMAN likely from glucosuric losses - resolved  - Renal indices improved      Chronic airway disease, likely COPD no PFTs at this time  Hx of Tobacco Use   - DuoNebs scheduled q6h PRN  - Improved        CODE STATUS: Full Code  Access: Peripheral  Antibiotics: None  Diet: Diabetic  DVT Prophylaxis: Lovenox  GI Prophylaxis: none needed  Fluids: None      Disposition: Day 8 of admission for s/p BKA post op day-4.  Further sugars controlled, future plans for  placement include SNF vs. Home Health with PT, potential discharge for tomorrow     Con Anaya MD  [facility] PGY-1

## 2022-12-26 LAB
ALBUMIN SERPL-MCNC: 2.8 G/DL (ref 3.4–4.8)
ALBUMIN/GLOB SERPL: 0.6 RATIO (ref 1.1–2)
ALP SERPL-CCNC: 71 UNIT/L (ref 40–150)
ALT SERPL-CCNC: 20 UNIT/L (ref 0–55)
AST SERPL-CCNC: 31 UNIT/L (ref 5–34)
BASOPHILS # BLD AUTO: 0.08 X10(3)/MCL (ref 0–0.2)
BASOPHILS NFR BLD AUTO: 1.1 %
BILIRUBIN DIRECT+TOT PNL SERPL-MCNC: 0.6 MG/DL
BUN SERPL-MCNC: 26.7 MG/DL (ref 8.4–25.7)
CALCIUM SERPL-MCNC: 9.7 MG/DL (ref 8.8–10)
CHLORIDE SERPL-SCNC: 100 MMOL/L (ref 98–107)
CO2 SERPL-SCNC: 27 MMOL/L (ref 23–31)
CREAT SERPL-MCNC: 0.76 MG/DL (ref 0.73–1.18)
EOSINOPHIL # BLD AUTO: 0.11 X10(3)/MCL (ref 0–0.9)
EOSINOPHIL NFR BLD AUTO: 1.6 %
ERYTHROCYTE [DISTWIDTH] IN BLOOD BY AUTOMATED COUNT: 13.2 % (ref 11.6–14.4)
GFR SERPLBLD CREATININE-BSD FMLA CKD-EPI: >60 MLS/MIN/1.73/M2
GLOBULIN SER-MCNC: 4.5 GM/DL (ref 2.4–3.5)
GLUCOSE SERPL-MCNC: 74 MG/DL (ref 82–115)
HCT VFR BLD AUTO: 43.7 % (ref 42–52)
HGB BLD-MCNC: 15.3 GM/DL (ref 14–18)
IMM GRANULOCYTES # BLD AUTO: 0.2 X10(3)/MCL (ref 0–0.04)
IMM GRANULOCYTES NFR BLD AUTO: 2.9 %
LYMPHOCYTES # BLD AUTO: 1.66 X10(3)/MCL (ref 0.6–4.6)
LYMPHOCYTES NFR BLD AUTO: 23.7 %
MCH RBC QN AUTO: 33.2 PG
MCHC RBC AUTO-ENTMCNC: 35 MG/DL (ref 33–36)
MCV RBC AUTO: 94.8 FL (ref 80–94)
MONOCYTES # BLD AUTO: 0.93 X10(3)/MCL (ref 0.1–1.3)
MONOCYTES NFR BLD AUTO: 13.3 %
NEUTROPHILS # BLD AUTO: 4.02 X10(3)/MCL (ref 2.1–9.2)
NEUTROPHILS NFR BLD AUTO: 57.4 %
NRBC BLD AUTO-RTO: 0 % (ref 0–1)
PLATELET # BLD AUTO: 208 X10(3)/MCL (ref 140–371)
PMV BLD AUTO: 10.8 FL (ref 9.4–12.4)
POCT GLUCOSE: 127 MG/DL (ref 70–110)
POCT GLUCOSE: 219 MG/DL (ref 70–110)
POCT GLUCOSE: 254 MG/DL (ref 70–110)
POCT GLUCOSE: 285 MG/DL (ref 70–110)
POCT GLUCOSE: 82 MG/DL (ref 70–110)
POTASSIUM SERPL-SCNC: 4.2 MMOL/L (ref 3.5–5.1)
PROT SERPL-MCNC: 7.3 GM/DL (ref 5.8–7.6)
RBC # BLD AUTO: 4.61 X10(6)/MCL (ref 4.7–6.1)
SODIUM SERPL-SCNC: 135 MMOL/L (ref 136–145)
WBC # SPEC AUTO: 7 X10(3)/MCL (ref 4.5–11.5)

## 2022-12-26 PROCEDURE — 25000003 PHARM REV CODE 250

## 2022-12-26 PROCEDURE — 63600175 PHARM REV CODE 636 W HCPCS

## 2022-12-26 PROCEDURE — 63600175 PHARM REV CODE 636 W HCPCS: Performed by: STUDENT IN AN ORGANIZED HEALTH CARE EDUCATION/TRAINING PROGRAM

## 2022-12-26 PROCEDURE — 11000001 HC ACUTE MED/SURG PRIVATE ROOM

## 2022-12-26 PROCEDURE — 25000242 PHARM REV CODE 250 ALT 637 W/ HCPCS

## 2022-12-26 PROCEDURE — 94640 AIRWAY INHALATION TREATMENT: CPT

## 2022-12-26 PROCEDURE — 94761 N-INVAS EAR/PLS OXIMETRY MLT: CPT

## 2022-12-26 PROCEDURE — 36415 COLL VENOUS BLD VENIPUNCTURE: CPT

## 2022-12-26 PROCEDURE — 80053 COMPREHEN METABOLIC PANEL: CPT

## 2022-12-26 PROCEDURE — 85025 COMPLETE CBC W/AUTO DIFF WBC: CPT

## 2022-12-26 RX ADMIN — MORPHINE SULFATE 2 MG: 2 INJECTION, SOLUTION INTRAMUSCULAR; INTRAVENOUS at 12:12

## 2022-12-26 RX ADMIN — GABAPENTIN 600 MG: 300 CAPSULE ORAL at 10:12

## 2022-12-26 RX ADMIN — ASPIRIN 81 MG CHEWABLE TABLET 81 MG: 81 TABLET CHEWABLE at 08:12

## 2022-12-26 RX ADMIN — MICONAZOLE NITRATE: 20 CREAM TOPICAL at 08:12

## 2022-12-26 RX ADMIN — COLLAGENASE SANTYL: 250 OINTMENT TOPICAL at 08:12

## 2022-12-26 RX ADMIN — INSULIN ASPART 3 UNITS: 100 INJECTION, SOLUTION INTRAVENOUS; SUBCUTANEOUS at 10:12

## 2022-12-26 RX ADMIN — HYDROCODONE BITARTRATE AND ACETAMINOPHEN 1 TABLET: 5; 325 TABLET ORAL at 08:12

## 2022-12-26 RX ADMIN — INSULIN DETEMIR 30 UNITS: 100 INJECTION, SOLUTION SUBCUTANEOUS at 10:12

## 2022-12-26 RX ADMIN — FLUTICASONE FUROATE AND VILANTEROL TRIFENATATE 1 PUFF: 100; 25 POWDER RESPIRATORY (INHALATION) at 07:12

## 2022-12-26 RX ADMIN — MORPHINE SULFATE 2 MG: 2 INJECTION, SOLUTION INTRAMUSCULAR; INTRAVENOUS at 10:12

## 2022-12-26 RX ADMIN — GABAPENTIN 600 MG: 300 CAPSULE ORAL at 02:12

## 2022-12-26 RX ADMIN — INSULIN ASPART 4 UNITS: 100 INJECTION, SOLUTION INTRAVENOUS; SUBCUTANEOUS at 04:12

## 2022-12-26 RX ADMIN — MORPHINE SULFATE 2 MG: 2 INJECTION, SOLUTION INTRAMUSCULAR; INTRAVENOUS at 04:12

## 2022-12-26 RX ADMIN — HYDROCODONE BITARTRATE AND ACETAMINOPHEN 1 TABLET: 5; 325 TABLET ORAL at 10:12

## 2022-12-26 RX ADMIN — DIVALPROEX SODIUM 500 MG: 500 TABLET, FILM COATED, EXTENDED RELEASE ORAL at 08:12

## 2022-12-26 RX ADMIN — HYDROCODONE BITARTRATE AND ACETAMINOPHEN 1 TABLET: 5; 325 TABLET ORAL at 02:12

## 2022-12-26 RX ADMIN — ENOXAPARIN SODIUM 40 MG: 40 INJECTION SUBCUTANEOUS at 04:12

## 2022-12-26 RX ADMIN — GABAPENTIN 600 MG: 300 CAPSULE ORAL at 08:12

## 2022-12-26 RX ADMIN — IPRATROPIUM BROMIDE AND ALBUTEROL SULFATE 3 ML: 2.5; .5 SOLUTION RESPIRATORY (INHALATION) at 07:12

## 2022-12-26 RX ADMIN — INSULIN ASPART 4 UNITS: 100 INJECTION, SOLUTION INTRAVENOUS; SUBCUTANEOUS at 12:12

## 2022-12-26 RX ADMIN — ATORVASTATIN CALCIUM 40 MG: 20 TABLET, FILM COATED ORAL at 08:12

## 2022-12-26 RX ADMIN — MICONAZOLE NITRATE: 20 CREAM TOPICAL at 10:12

## 2022-12-26 RX ADMIN — INSULIN DETEMIR 30 UNITS: 100 INJECTION, SOLUTION SUBCUTANEOUS at 08:12

## 2022-12-26 RX ADMIN — DULOXETINE 30 MG: 30 CAPSULE, DELAYED RELEASE ORAL at 10:12

## 2022-12-26 RX ADMIN — DULOXETINE 30 MG: 30 CAPSULE, DELAYED RELEASE ORAL at 08:12

## 2022-12-26 NOTE — PROGRESS NOTES
[facility] Wards   Progress Note     Resident Team: [facility] Medicine List 1  Attending Physician: Evie Burger MD  Resident: Rl Kang MD  Intern: Con Anaya MD     Hospital Length of Stay: 9 days    Subjective:      Brief HPI:  Con Arnold is a 62 y.o. male who with a history of chronic osteomyelitis of the foot bilaterally on suppresive doxycycline, htn, uncontrolled DMII, who presented to [facility] ED on 12/16/2022  with complaint of bilateral foot pain, ulcers.     Patient has hx of osteomyelitis of the foot with numerous admissions for antibiotic treatment. Patients was followed in [place] and was on suppressive doxycycline to which he has not taken in 2 months. He states he has run out of supplies for cleaning his foot ulcers, and states he is homeless. Patient was admitted in [place] where he received 1 month of antibiotics in July, and then another month in august in [place] where he had a toe and metatarsals removed from his right foot. He states that it helped however he has recently moved back. His feet are worse over the past month, he has pain though still is able to ambulate. He is with his son and they are trying to get housing although they have been running into difficulties. Patient sought consult due to unbearable pain, will admit for abx, wound assessment and treatment, and blood sugar control.     Of note he has not had insulin in over a year.    Interval History:Patient is post op day 5. of BKA. VSS. NAEON. Patient is doing well.  No hypoglycemic episodes.  Patient has been doing well, notably morning insulin on CMP today was 74 mg/dL repeat was 82 mg/dL.  Discussed possibility of him going home tomorrow pending home physical therapy versus finding a facility for further rehab.  Patient is amenable to any possibility and is agreeable for discharge tomorrow.      Review of Systems   Constitutional:  Negative for chills, fever and weight loss.   Respiratory:  Negative for cough and wheezing.     Cardiovascular:  Negative for chest pain and palpitations.   Gastrointestinal:  Negative for abdominal pain, nausea and vomiting.   Musculoskeletal:  Negative for myalgias.        Phantom limb pain left foot   Neurological:  Negative for focal weakness and headaches.        Objective:     Vital Signs (Most Recent):  Temp: 97.8 °F (36.6 °C) (12/26/22 0318)  Pulse: 69 (12/26/22 0318)  Resp: 18 (12/26/22 0318)  BP: 117/71 (12/26/22 0318)  SpO2: (!) 90 % (12/26/22 0318)   Vital Signs (24h Range):  Temp:  [97.4 °F (36.3 °C)-98.7 °F (37.1 °C)] 97.8 °F (36.6 °C)  Pulse:  [64-71] 69  Resp:  [18-20] 18  SpO2:  [90 %-93 %] 90 %  BP: (117-138)/(71-78) 117/71     Physical Examination:  Constitutional:       General: He is not in acute distress.     Appearance: Normal appearance. He is not ill-appearing.   HENT:      Head: Normocephalic and atraumatic.      Nose: Nose normal.   Eyes:      Extraocular Movements: Extraocular movements intact.      Conjunctiva/sclera: Conjunctivae normal.      Pupils: Pupils are equal, round, and reactive to light.   Cardiovascular:      Rate and Rhythm: Normal rate and regular rhythm.   Pulmonary:      Effort: Pulmonary effort is normal. No respiratory distress.      Breath sounds: CTAB     Comments: Breath sounds distant, no wheezing  Abdominal:      General: Abdomen is flat.      Palpations: Abdomen is soft.   Musculoskeletal:         General:  Left BKA, with brace and non soaked bandages present. Normal range of motion.   Neurological:      Mental Status: He is alert.      Sensory: Sensory deficit present.   Psychiatric:         Mood and Affect: Mood normal.         Behavior: Behavior normal.      Laboratory:  Most Recent Data:  CBC:   Recent Labs   Lab 12/25/22  0356 12/26/22  0301   WBC 6.3 7.0   HGB 14.4 15.3   HCT 42.1 43.7    208       CMP:   Recent Labs   Lab 12/26/22  0301   CALCIUM 9.7   ALBUMIN 2.8*   *   K 4.2   CO2 27   BUN 26.7*   CREATININE 0.76   ALKPHOS 71   ALT 20    AST 31   BILITOT 0.6         Radiology:  Imaging Results              CT Foot W W/O Contrast Bilateral (Final result)  Result time 12/17/22 10:04:45      Final result by Ayse Martinez MD (12/17/22 10:04:45)                   Impression:      1. No definite acute fracture or acute erosive bone changes.  2. Chronic appearing changes at the left 2nd MTP joint.  3. Bilateral plantar surface soft tissue ulcerations and blisters.  Otherwise no deep drainable fluid collection.  4. 3 mm radiopaque foreign body in the plantar surface underlying the right 1st metatarsal head.      Electronically signed by: Ayse Martinez  Date:    12/17/2022  Time:    10:04               Narrative:    EXAMINATION:  CT FOOT W W/O CONTRAST BILATERAL    CLINICAL HISTORY:  Foot pain, chronic, osseous injury suspected;Diabetic Foot, r/o osteo;    TECHNIQUE:  CT images of the bilateral feet with and without contrast.  Axial, coronal, and sagittal reformatted images were obtained. Dose length product is 143 mGycm. Automatic exposure control, adjustment of mA/kV or iterative reconstruction technique was used to limit radiation dose.    COMPARISON:  X-rays dated 12/16/2022    FINDINGS:  Right foot:    There is no acute fracture identified.  There are no definite acute erosive bone changes.    There is a soft tissue ulceration along the plantar surface underlying the 3rd metatarsal head, with blister at the skin surface measuring 5 x 23 mm in size (series 14, image 32).  There is otherwise no drainable fluid collection.  There is a 3 mm radiopaque foreign body in the plantar surface underlying the 1st metatarsal head.    Left foot:    There is no definite acute fracture identified.  There is dorsal subluxation at the 2nd and 3rd MTP joints.  There are chronic appearing changes at the 2nd MTP joint.  There are no definite acute erosive changes identified.    There is plantar soft tissue ulceration with blister at the skin surface underlying  the 2nd and 3rd metatarsal heads.  There is otherwise no drainable fluid collection identified.  There is no radiopaque foreign body.                                       X-Ray Foot Complete Right (Final result)  Result time 12/17/22 10:43:42      Final result by Yoly Mendez MD (12/17/22 10:43:42)                   Impression:      No change since prior      Electronically signed by: Yoly Mendez  Date:    12/17/2022  Time:    10:43               Narrative:    EXAMINATION:  XR FOOT COMPLETE 3 VIEW RIGHT    CLINICAL HISTORY:  . Diabetes mellitus due to underlying condition with foot ulcer    TECHNIQUE:  AP, lateral, and oblique views of the right foot were performed.    COMPARISON:  11/11/2022    FINDINGS:  Patient is status post amputation of the distal 2nd metatarsal.  There is a radiopaque foreign body seen at the base of the 1st toe.  It is unchanged since prior examination.  No fracture seen.  No dislocation is seen.                                       X-Ray Foot Complete Left (Final result)  Result time 12/17/22 10:36:22      Final result by Audi Giles MD (12/17/22 10:36:22)                   Impression:      Degenerative changes.    Dislocations slight subluxation of the 2nd and 3rd metatarsophalangeal joints.    Soft tissue ulceration.    No clear evidence of osteomyelitis.      Electronically signed by: Audi Giles  Date:    12/17/2022  Time:    10:36               Narrative:    EXAMINATION:  XR FOOT COMPLETE 3 VIEW LEFT    CLINICAL HISTORY:  Diabetes mellitus due to underlying condition with foot ulcer    COMPARISON:  None.    FINDINGS:  No acute displaced fractures or dislocations.    There are some degenerative changes of the proximal and distal interphalangeal joints with a slight hilus valgus deformity    There are persistent dislocations/subluxations involving the 2nd and 3rd metatarsophalangeal joints which appears to be unchanged as compared with the previous  exam    There might be soft tissue disruption indicating the presence of an ulceration, however, on the provided images there is no evidence of primary or secondary signs to suggest the presence of osteomyelitis other imaging modalities might prove helpful for further assessment                                       X-Ray Chest PA And Lateral (Final result)  Result time 12/17/22 10:28:04      Final result by Audi Giles MD (12/17/22 10:28:04)                   Impression:      No acute chest disease is identified.      Electronically signed by: Audi Giles  Date:    12/17/2022  Time:    10:28               Narrative:    EXAMINATION:  XR CHEST PA AND LATERAL    CLINICAL HISTORY:  , Cough, unspecified.    COMPARISON:  October 26, 2021    FINDINGS:  No alveolar consolidation, effusion, or pneumothorax is seen.   The thoracic aorta is normal  cardiac silhouette, central pulmonary vessels and mediastinum are normal in size and are grossly unremarkable.   visualized osseous structures are grossly unremarkable.                                      MRI Foot (Forefoot) Left W W/O Contrast 12/19/2022    Narrative  EXAMINATION:  MRI FOOT (FOREFOOT) LEFT W W/O CONTRAST    CLINICAL HISTORY:  Foot swelling, diabetic, osteomyelitis suspected, xray done;    TECHNIQUE:  Enhanced and unenhanced, multiplanar, multisequence MR imaging of the left foot was obtained.    COMPARISON:  MRI contralateral foot used for comparison dated 12/19/2022.  CT scan used for comparison dated 12/17/2022    FINDINGS:  Incongruent hallux valgus deformity.  Trabecular edema present to the distal phalanx of the great toe is equivocal.  Trabecular edema and an effusion present to the 1st metatarsophalangeal joint may represent early osteomyelitis.  Destructive osteomyelitis and septic arthritis are present to the 2nd and 3rd metatarsophalangeal joints with dorsal dislocation of the bases of the proximal phalanges and full-thickness tears of the  plantar plates.  The flexor tendon of the 2nd toe is largely torn.  The 3rd flexor tendon is medially dislocated but appears structurally intact.  An abscess is present about the 3rd metatarsal head and measures 3.5 cm x 2 cm in size.  It likely represents decompressed septic arthritis.    Myositis is present to the anterior intrinsic muscles.  An ulcer is present to the plantar aspect of the forefoot with necrotic tissue present in the ulcer base.  This measures approximately 4.5 cm x 3 cm and is nicely demonstrated on image 19 of series 9.  Suppurative flexor tenosynovitis is present to the midfoot.    Impression  Destructive septic arthritis/osteomyelitis is present to the 2nd and 3rd tarsal metatarsal joints with dorsal dislocation of the bases of the proximal phalanges and full-thickness tearing of the plantar plates.  The 2nd flexor tendon is largely torn.  The 3rd flexor tendon is dislocated medially.  A 3.5 cm x 2 cm abscess is present about the 3rd metatarsal head and likely represents decompressed septic arthritis.    Septic arthritis is likely present to the 1st metatarsal phalangeal joint.  Edema and trace enhancement seen to the distal phalanx of the great toe is equivocal..    Suppurative flexor tenosynovitis.  An ulcer is present to the plantar aspect of the forefoot with 4.5 cm of necrotic tissue present in the ulcer base.  This is nicely demonstrated on image 19 of series 9.    Myositis to the anterior intrinsic muscles.  Cellulitis is present to the forefoot.      Electronically signed by: Kenneth Madsen  Date:    12/19/2022  Time:    17:16    Current Medications:     Infusions:         Scheduled:   aspirin  81 mg Oral Daily    atorvastatin  40 mg Oral Daily    ceFAZolin  2 g Intravenous Once    collagenase   Topical (Top) Daily    divalproex  500 mg Oral Daily    DULoxetine  30 mg Oral BID    enoxaparin  40 mg Subcutaneous Daily    fluticasone furoate-vilanteroL  1 puff Inhalation Daily     gabapentin  600 mg Oral TID    insulin detemir U-100  30 Units Subcutaneous QHS    insulin detemir U-100  30 Units Subcutaneous Daily    miconazole   Topical (Top) BID        PRN:  albuterol-ipratropium, dextrose 10%, dextrose 10%, glucagon (human recombinant), glucose, glucose, HYDROcodone-acetaminophen, insulin aspart U-100, morphine, naloxone, sodium chloride 0.9%, sodium chloride 0.9%    Antibiotics and Day Number of Therapy:  Vancomycin and Zosyn - Day 5      Intake/Output Summary (Last 24 hours) at 12/26/2022 0652  Last data filed at 12/26/2022 0629  Gross per 24 hour   Intake 360 ml   Output 1200 ml   Net -840 ml         Lines/Drains/Airways       Peripheral Intravenous Line  Duration                  Peripheral IV - Single Lumen 12/23/22 2124 20 G Anterior;Right Forearm 2 days                  Assessment & Plan:   Diabetic Ulcers, Bilateral  Hx of Osteomyelitis of the Feet, Bilateral  Osteomyelitis and Septic arthritis, Left Foot S/P left BKA post-op day 1  Uncontrolled DMII  - HbA1c 11.3% at this time  - Has not taken insulin in over 1 year  - Worsening ulcers, left worse than right on plantar surfaces   - Strict glucose monitoring  - Increased to 30 units twice a day, a.m. and nightly, morning insulin at 74 mg/dL repeat at 82 mg/dL.  We will consider moving 5 units from the nighttime dose to the morning dose since afternoon and evening sugars are elevated.  239, 216, 255 last 3 checks yesterday.   - Antibiotics stopped today. Source control established.   - CT foot without obvious osteomyelitis bilaterally  - MRI foot bilateral: Osteomyelitis, septic arthritis in left foot  - Surgery on board; BKA done 12/21/2022  - PRN Pain medications in place.  - Gabapentin 600mg on board for diabetic neuropathy  - Wound care consulted  - Ord[facility] PT and OT       Mild IMAN likely from glucosuric losses - resolved  - Renal indices improved      Chronic airway disease, likely COPD no PFTs at this time  Hx of Tobacco Use   -  DuoNebs scheduled q6h PRN  - Improved        CODE STATUS: Full Code  Access: Peripheral  Antibiotics: None  Diet: Diabetic  DVT Prophylaxis: Lovenox  GI Prophylaxis: none needed  Fluids: None      Disposition: Day 9 of admission for s/p BKA post op day-5.  Further sugars controlled, future plans for placement include SNF vs. Home Health with PT, unlikely to be discharged today will work with case management tomorrow to find placement    Con Anaya MD  [employer] [facility] PGY-1

## 2022-12-27 PROBLEM — I73.9 PAD (PERIPHERAL ARTERY DISEASE): Chronic | Status: ACTIVE | Noted: 2022-12-27

## 2022-12-27 PROBLEM — E08.621 DIABETIC ULCER OF FOOT ASSOCIATED WITH DIABETES MELLITUS DUE TO UNDERLYING CONDITION, LIMITED TO BREAKDOWN OF SKIN: Status: ACTIVE | Noted: 2022-12-27

## 2022-12-27 PROBLEM — L97.501 DIABETIC ULCER OF FOOT ASSOCIATED WITH DIABETES MELLITUS DUE TO UNDERLYING CONDITION, LIMITED TO BREAKDOWN OF SKIN: Status: ACTIVE | Noted: 2022-12-27

## 2022-12-27 LAB
ALBUMIN SERPL-MCNC: 2.8 G/DL (ref 3.4–4.8)
ALBUMIN/GLOB SERPL: 0.7 RATIO (ref 1.1–2)
ALP SERPL-CCNC: 82 UNIT/L (ref 40–150)
ALT SERPL-CCNC: 26 UNIT/L (ref 0–55)
AST SERPL-CCNC: 34 UNIT/L (ref 5–34)
BASOPHILS # BLD AUTO: 0.06 X10(3)/MCL (ref 0–0.2)
BASOPHILS NFR BLD AUTO: 1.2 %
BILIRUBIN DIRECT+TOT PNL SERPL-MCNC: 0.5 MG/DL
BUN SERPL-MCNC: 33.9 MG/DL (ref 8.4–25.7)
CALCIUM SERPL-MCNC: 10.1 MG/DL (ref 8.8–10)
CHLORIDE SERPL-SCNC: 101 MMOL/L (ref 98–107)
CO2 SERPL-SCNC: 32 MMOL/L (ref 23–31)
CREAT SERPL-MCNC: 0.84 MG/DL (ref 0.73–1.18)
EOSINOPHIL # BLD AUTO: 0.17 X10(3)/MCL (ref 0–0.9)
EOSINOPHIL NFR BLD AUTO: 3.5 %
ERYTHROCYTE [DISTWIDTH] IN BLOOD BY AUTOMATED COUNT: 13.3 % (ref 11.6–14.4)
GFR SERPLBLD CREATININE-BSD FMLA CKD-EPI: >60 MLS/MIN/1.73/M2
GLOBULIN SER-MCNC: 4.3 GM/DL (ref 2.4–3.5)
GLUCOSE SERPL-MCNC: 113 MG/DL (ref 82–115)
HCT VFR BLD AUTO: 44.2 % (ref 42–52)
HGB BLD-MCNC: 14.8 GM/DL (ref 14–18)
IMM GRANULOCYTES # BLD AUTO: 0.17 X10(3)/MCL (ref 0–0.04)
IMM GRANULOCYTES NFR BLD AUTO: 3.5 %
LYMPHOCYTES # BLD AUTO: 1.59 X10(3)/MCL (ref 0.6–4.6)
LYMPHOCYTES NFR BLD AUTO: 32.4 %
MCH RBC QN AUTO: 32.5 PG
MCHC RBC AUTO-ENTMCNC: 33.5 MG/DL (ref 33–36)
MCV RBC AUTO: 96.9 FL (ref 80–94)
MONOCYTES # BLD AUTO: 0.69 X10(3)/MCL (ref 0.1–1.3)
MONOCYTES NFR BLD AUTO: 14.1 %
NEUTROPHILS # BLD AUTO: 2.22 X10(3)/MCL (ref 2.1–9.2)
NEUTROPHILS NFR BLD AUTO: 45.3 %
NRBC BLD AUTO-RTO: 0 % (ref 0–1)
PLATELET # BLD AUTO: 217 X10(3)/MCL (ref 140–371)
PMV BLD AUTO: 10.4 FL (ref 9.4–12.4)
POCT GLUCOSE: 129 MG/DL (ref 70–110)
POCT GLUCOSE: 220 MG/DL (ref 70–110)
POCT GLUCOSE: 232 MG/DL (ref 70–110)
POCT GLUCOSE: 286 MG/DL (ref 70–110)
POCT GLUCOSE: 86 MG/DL (ref 70–110)
POTASSIUM SERPL-SCNC: 4.6 MMOL/L (ref 3.5–5.1)
PROT SERPL-MCNC: 7.1 GM/DL (ref 5.8–7.6)
RBC # BLD AUTO: 4.56 X10(6)/MCL (ref 4.7–6.1)
SODIUM SERPL-SCNC: 139 MMOL/L (ref 136–145)
WBC # SPEC AUTO: 4.9 X10(3)/MCL (ref 4.5–11.5)

## 2022-12-27 PROCEDURE — 63600175 PHARM REV CODE 636 W HCPCS

## 2022-12-27 PROCEDURE — 63600175 PHARM REV CODE 636 W HCPCS: Performed by: STUDENT IN AN ORGANIZED HEALTH CARE EDUCATION/TRAINING PROGRAM

## 2022-12-27 PROCEDURE — 80053 COMPREHEN METABOLIC PANEL: CPT

## 2022-12-27 PROCEDURE — 97530 THERAPEUTIC ACTIVITIES: CPT

## 2022-12-27 PROCEDURE — 94640 AIRWAY INHALATION TREATMENT: CPT

## 2022-12-27 PROCEDURE — 11000001 HC ACUTE MED/SURG PRIVATE ROOM

## 2022-12-27 PROCEDURE — 97116 GAIT TRAINING THERAPY: CPT

## 2022-12-27 PROCEDURE — 25000242 PHARM REV CODE 250 ALT 637 W/ HCPCS

## 2022-12-27 PROCEDURE — 94761 N-INVAS EAR/PLS OXIMETRY MLT: CPT

## 2022-12-27 PROCEDURE — 36415 COLL VENOUS BLD VENIPUNCTURE: CPT

## 2022-12-27 PROCEDURE — 85025 COMPLETE CBC W/AUTO DIFF WBC: CPT

## 2022-12-27 PROCEDURE — 25000003 PHARM REV CODE 250

## 2022-12-27 RX ORDER — DULOXETIN HYDROCHLORIDE 30 MG/1
30 CAPSULE, DELAYED RELEASE ORAL 2 TIMES DAILY
Qty: 60 CAPSULE | Refills: 11 | Status: SHIPPED | OUTPATIENT
Start: 2022-12-27 | End: 2023-12-27

## 2022-12-27 RX ADMIN — DIVALPROEX SODIUM 500 MG: 500 TABLET, FILM COATED, EXTENDED RELEASE ORAL at 09:12

## 2022-12-27 RX ADMIN — DULOXETINE 30 MG: 30 CAPSULE, DELAYED RELEASE ORAL at 09:12

## 2022-12-27 RX ADMIN — INSULIN DETEMIR 25 UNITS: 100 INJECTION, SOLUTION SUBCUTANEOUS at 08:12

## 2022-12-27 RX ADMIN — INSULIN ASPART 3 UNITS: 100 INJECTION, SOLUTION INTRAVENOUS; SUBCUTANEOUS at 08:12

## 2022-12-27 RX ADMIN — DULOXETINE 30 MG: 30 CAPSULE, DELAYED RELEASE ORAL at 08:12

## 2022-12-27 RX ADMIN — GABAPENTIN 600 MG: 300 CAPSULE ORAL at 02:12

## 2022-12-27 RX ADMIN — MORPHINE SULFATE 2 MG: 2 INJECTION, SOLUTION INTRAMUSCULAR; INTRAVENOUS at 02:12

## 2022-12-27 RX ADMIN — ATORVASTATIN CALCIUM 40 MG: 20 TABLET, FILM COATED ORAL at 09:12

## 2022-12-27 RX ADMIN — MORPHINE SULFATE 2 MG: 2 INJECTION, SOLUTION INTRAMUSCULAR; INTRAVENOUS at 06:12

## 2022-12-27 RX ADMIN — IPRATROPIUM BROMIDE AND ALBUTEROL SULFATE 3 ML: 2.5; .5 SOLUTION RESPIRATORY (INHALATION) at 07:12

## 2022-12-27 RX ADMIN — HYDROCODONE BITARTRATE AND ACETAMINOPHEN 1 TABLET: 5; 325 TABLET ORAL at 08:12

## 2022-12-27 RX ADMIN — COLLAGENASE SANTYL: 250 OINTMENT TOPICAL at 09:12

## 2022-12-27 RX ADMIN — GABAPENTIN 600 MG: 300 CAPSULE ORAL at 09:12

## 2022-12-27 RX ADMIN — INSULIN ASPART 4 UNITS: 100 INJECTION, SOLUTION INTRAVENOUS; SUBCUTANEOUS at 12:12

## 2022-12-27 RX ADMIN — ASPIRIN 81 MG CHEWABLE TABLET 81 MG: 81 TABLET CHEWABLE at 09:12

## 2022-12-27 RX ADMIN — GABAPENTIN 600 MG: 300 CAPSULE ORAL at 08:12

## 2022-12-27 RX ADMIN — ENOXAPARIN SODIUM 40 MG: 40 INJECTION SUBCUTANEOUS at 05:12

## 2022-12-27 RX ADMIN — INSULIN DETEMIR 35 UNITS: 100 INJECTION, SOLUTION SUBCUTANEOUS at 09:12

## 2022-12-27 RX ADMIN — MICONAZOLE NITRATE: 20 CREAM TOPICAL at 09:12

## 2022-12-27 NOTE — PLAN OF CARE
Problem: Adult Inpatient Plan of Care  Goal: Plan of Care Review  12/27/2022 0455 by Shasta Valentin RN  Outcome: Ongoing, Progressing  12/27/2022 0455 by Shasta Valentin RN  Outcome: Ongoing, Progressing  Goal: Patient-Specific Goal (Individualized)  12/27/2022 0455 by Shasta Valentin RN  Outcome: Ongoing, Progressing  12/27/2022 0455 by Shasta Valentin RN  Outcome: Ongoing, Progressing  Goal: Absence of Hospital-Acquired Illness or Injury  12/27/2022 0455 by Shasta Valentin RN  Outcome: Ongoing, Progressing  12/27/2022 0455 by Shasta Valentin RN  Outcome: Ongoing, Progressing  Goal: Optimal Comfort and Wellbeing  12/27/2022 0455 by Shasta Valentin RN  Outcome: Ongoing, Progressing  12/27/2022 0455 by Shasta Valentin RN  Outcome: Ongoing, Progressing  Goal: Readiness for Transition of Care  12/27/2022 0455 by Shasta Valentin RN  Outcome: Ongoing, Progressing  12/27/2022 0455 by Shasta Valentin RN  Outcome: Ongoing, Progressing     Problem: Impaired Wound Healing  Goal: Optimal Wound Healing  12/27/2022 0455 by Shasta Valentin RN  Outcome: Ongoing, Progressing  12/27/2022 0455 by Shasta Valentin RN  Outcome: Ongoing, Progressing     Problem: Infection  Goal: Absence of Infection Signs and Symptoms  12/27/2022 0455 by Shasta Valentin RN  Outcome: Ongoing, Progressing  12/27/2022 0455 by Shasta Valentin RN  Outcome: Ongoing, Progressing     Problem: Electrolyte Imbalance  Goal: Electrolyte Balance  12/27/2022 0455 by Shasta Valentin RN  Outcome: Ongoing, Progressing  12/27/2022 0455 by Shasta Valentin RN  Outcome: Ongoing, Progressing     Problem: Diabetes Comorbidity  Goal: Blood Glucose Level Within Targeted Range  12/27/2022 0455 by Shasta Valentin RN  Outcome: Ongoing, Progressing  12/27/2022 0455 by Shasta Valentin RN  Outcome: Ongoing, Progressing     Problem: Skin Injury Risk Increased  Goal: Skin Health and Integrity  12/27/2022 0455 by Shasta Valentin RN  Outcome: Ongoing, Progressing  12/27/2022 0455 by Shasta Valentin RN  Outcome: Ongoing,  Progressing     Problem: Fall Injury Risk  Goal: Absence of Fall and Fall-Related Injury  12/27/2022 0455 by Shasta Valentin RN  Outcome: Ongoing, Progressing  12/27/2022 0455 by Shasta Valentin RN  Outcome: Ongoing, Progressing

## 2022-12-27 NOTE — PROGRESS NOTES
[facility] Medicine Wards   Progress Note     Resident Team: [facility] Medicine List 1  Attending Physician: Evie Burger MD  Resident: Rl Kang MD  Intern: Con Anaya MD     Hospital Length of Stay: 10 days    Subjective:      Brief HPI:  Con Arnold is a 62 y.o. male who with a history of chronic osteomyelitis of the foot bilaterally on suppresive doxycycline, htn, uncontrolled DMII, who presented to [facility] ED on 12/16/2022  with complaint of bilateral foot pain, ulcers.     Patient has hx of osteomyelitis of the foot with numerous admissions for antibiotic treatment. Patients was followed in ID and was on suppressive doxycycline to which he has not taken in 2 months. He states he has run out of supplies for cleaning his foot ulcers, and states he is homeless. Patient was admitted in [place] where he received 1 month of antibiotics in July, and then another month in august in [place] where he had a toe and metatarsals removed from his right foot. He states that it helped however he has recently moved back. His feet are worse over the past month, he has pain though still is able to ambulate. He is with his son and they are trying to get housing although they have been running into difficulties. Patient sought consult due to unbearable pain, will admit for abx, wound assessment and treatment, and blood sugar control.     Of note he has not had insulin in over a year.    Interval History:s/p left BKA. VSS. NAEON. Patient is doing well.  No hypoglycemic episodes.  Patient has been doing well, notably morning insulin on 86 mg/dl. Previously discussed possibility of him going home tomorrow pending home physical therapy versus finding a facility for further rehab. Spoke with PT patient requires rehab, due to mobility limitations. Processing rehab placement pending insurance.       Review of Systems   Constitutional:  Negative for chills, fever and weight loss.   Respiratory:  Negative for cough and wheezing.     Cardiovascular:  Negative for chest pain and palpitations.   Gastrointestinal:  Negative for abdominal pain, nausea and vomiting.   Musculoskeletal:  Negative for myalgias.        Phantom limb pain left foot, LEFT BKA        Objective:     Vital Signs (Most Recent):  Temp: 97.5 °F (36.4 °C) (12/27/22 0747)  Pulse: 61 (12/27/22 0750)  Resp: 18 (12/27/22 0750)  BP: 126/74 (12/27/22 0747)  SpO2: 95 % (12/27/22 0750)   Vital Signs (24h Range):  Temp:  [97.3 °F (36.3 °C)-98.2 °F (36.8 °C)] 97.5 °F (36.4 °C)  Pulse:  [60-72] 61  Resp:  [16-20] 18  SpO2:  [93 %-95 %] 95 %  BP: (119-144)/(70-83) 126/74     Physical Examination:  Constitutional:       General: He is not in acute distress.     Appearance: Normal appearance. He is not ill-appearing.   HENT:      Head: Normocephalic and atraumatic.      Nose: Nose normal.   Eyes:      Extraocular Movements: Extraocular movements intact.      Conjunctiva/sclera: Conjunctivae normal.      Pupils: Pupils are equal, round, and reactive to light.   Cardiovascular:      Rate and Rhythm: Normal rate and regular rhythm.   Pulmonary:      Effort: Pulmonary effort is normal. No respiratory distress.      Breath sounds: CTAB     Comments: Breath sounds distant, no wheezing  Abdominal:      General: Abdomen is flat.      Palpations: Abdomen is soft.   Musculoskeletal:         General:  Left BKA, with brace and non soaked bandages present. Normal range of motion.   Neurological:      Mental Status: He is alert.      Sensory: Sensory deficit present.   Psychiatric:         Mood and Affect: Mood normal.         Behavior: Behavior normal.      Laboratory:  Most Recent Data:  CBC:   Recent Labs   Lab 12/26/22  0301 12/27/22  0420   WBC 7.0 4.9   HGB 15.3 14.8   HCT 43.7 44.2    217       CMP:   Recent Labs   Lab 12/27/22 0420   CALCIUM 10.1*   ALBUMIN 2.8*      K 4.6   CO2 32*   BUN 33.9*   CREATININE 0.84   ALKPHOS 82   ALT 26   AST 34   BILITOT 0.5         Radiology:  Imaging  Results              CT Foot W W/O Contrast Bilateral (Final result)  Result time 12/17/22 10:04:45      Final result by Ayse Martinez MD (12/17/22 10:04:45)                   Impression:      1. No definite acute fracture or acute erosive bone changes.  2. Chronic appearing changes at the left 2nd MTP joint.  3. Bilateral plantar surface soft tissue ulcerations and blisters.  Otherwise no deep drainable fluid collection.  4. 3 mm radiopaque foreign body in the plantar surface underlying the right 1st metatarsal head.      Electronically signed by: Ayse Martinez  Date:    12/17/2022  Time:    10:04               Narrative:    EXAMINATION:  CT FOOT W W/O CONTRAST BILATERAL    CLINICAL HISTORY:  Foot pain, chronic, osseous injury suspected;Diabetic Foot, r/o osteo;    TECHNIQUE:  CT images of the bilateral feet with and without contrast.  Axial, coronal, and sagittal reformatted images were obtained. Dose length product is 143 mGycm. Automatic exposure control, adjustment of mA/kV or iterative reconstruction technique was used to limit radiation dose.    COMPARISON:  X-rays dated 12/16/2022    FINDINGS:  Right foot:    There is no acute fracture identified.  There are no definite acute erosive bone changes.    There is a soft tissue ulceration along the plantar surface underlying the 3rd metatarsal head, with blister at the skin surface measuring 5 x 23 mm in size (series 14, image 32).  There is otherwise no drainable fluid collection.  There is a 3 mm radiopaque foreign body in the plantar surface underlying the 1st metatarsal head.    Left foot:    There is no definite acute fracture identified.  There is dorsal subluxation at the 2nd and 3rd MTP joints.  There are chronic appearing changes at the 2nd MTP joint.  There are no definite acute erosive changes identified.    There is plantar soft tissue ulceration with blister at the skin surface underlying the 2nd and 3rd metatarsal heads.  There is otherwise  no drainable fluid collection identified.  There is no radiopaque foreign body.                                       X-Ray Foot Complete Right (Final result)  Result time 12/17/22 10:43:42      Final result by Yoly Mendez MD (12/17/22 10:43:42)                   Impression:      No change since prior      Electronically signed by: Yoly Mendez  Date:    12/17/2022  Time:    10:43               Narrative:    EXAMINATION:  XR FOOT COMPLETE 3 VIEW RIGHT    CLINICAL HISTORY:  . Diabetes mellitus due to underlying condition with foot ulcer    TECHNIQUE:  AP, lateral, and oblique views of the right foot were performed.    COMPARISON:  11/11/2022    FINDINGS:  Patient is status post amputation of the distal 2nd metatarsal.  There is a radiopaque foreign body seen at the base of the 1st toe.  It is unchanged since prior examination.  No fracture seen.  No dislocation is seen.                                       X-Ray Foot Complete Left (Final result)  Result time 12/17/22 10:36:22      Final result by Audi Giles MD (12/17/22 10:36:22)                   Impression:      Degenerative changes.    Dislocations slight subluxation of the 2nd and 3rd metatarsophalangeal joints.    Soft tissue ulceration.    No clear evidence of osteomyelitis.      Electronically signed by: Audi Giles  Date:    12/17/2022  Time:    10:36               Narrative:    EXAMINATION:  XR FOOT COMPLETE 3 VIEW LEFT    CLINICAL HISTORY:  Diabetes mellitus due to underlying condition with foot ulcer    COMPARISON:  None.    FINDINGS:  No acute displaced fractures or dislocations.    There are some degenerative changes of the proximal and distal interphalangeal joints with a slight hilus valgus deformity    There are persistent dislocations/subluxations involving the 2nd and 3rd metatarsophalangeal joints which appears to be unchanged as compared with the previous exam    There might be soft tissue disruption indicating the  presence of an ulceration, however, on the provided images there is no evidence of primary or secondary signs to suggest the presence of osteomyelitis other imaging modalities might prove helpful for further assessment                                       X-Ray Chest PA And Lateral (Final result)  Result time 12/17/22 10:28:04      Final result by Audi Giles MD (12/17/22 10:28:04)                   Impression:      No acute chest disease is identified.      Electronically signed by: Audi Giles  Date:    12/17/2022  Time:    10:28               Narrative:    EXAMINATION:  XR CHEST PA AND LATERAL    CLINICAL HISTORY:  , Cough, unspecified.    COMPARISON:  October 26, 2021    FINDINGS:  No alveolar consolidation, effusion, or pneumothorax is seen.   The thoracic aorta is normal  cardiac silhouette, central pulmonary vessels and mediastinum are normal in size and are grossly unremarkable.   visualized osseous structures are grossly unremarkable.                                      MRI Foot (Forefoot) Left W W/O Contrast 12/19/2022    Narrative  EXAMINATION:  MRI FOOT (FOREFOOT) LEFT W W/O CONTRAST    CLINICAL HISTORY:  Foot swelling, diabetic, osteomyelitis suspected, xray done;    TECHNIQUE:  Enhanced and unenhanced, multiplanar, multisequence MR imaging of the left foot was obtained.    COMPARISON:  MRI contralateral foot used for comparison dated 12/19/2022.  CT scan used for comparison dated 12/17/2022    FINDINGS:  Incongruent hallux valgus deformity.  Trabecular edema present to the distal phalanx of the great toe is equivocal.  Trabecular edema and an effusion present to the 1st metatarsophalangeal joint may represent early osteomyelitis.  Destructive osteomyelitis and septic arthritis are present to the 2nd and 3rd metatarsophalangeal joints with dorsal dislocation of the bases of the proximal phalanges and full-thickness tears of the plantar plates.  The flexor tendon of the 2nd toe is largely  torn.  The 3rd flexor tendon is medially dislocated but appears structurally intact.  An abscess is present about the 3rd metatarsal head and measures 3.5 cm x 2 cm in size.  It likely represents decompressed septic arthritis.    Myositis is present to the anterior intrinsic muscles.  An ulcer is present to the plantar aspect of the forefoot with necrotic tissue present in the ulcer base.  This measures approximately 4.5 cm x 3 cm and is nicely demonstrated on image 19 of series 9.  Suppurative flexor tenosynovitis is present to the midfoot.    Impression  Destructive septic arthritis/osteomyelitis is present to the 2nd and 3rd tarsal metatarsal joints with dorsal dislocation of the bases of the proximal phalanges and full-thickness tearing of the plantar plates.  The 2nd flexor tendon is largely torn.  The 3rd flexor tendon is dislocated medially.  A 3.5 cm x 2 cm abscess is present about the 3rd metatarsal head and likely represents decompressed septic arthritis.    Septic arthritis is likely present to the 1st metatarsal phalangeal joint.  Edema and trace enhancement seen to the distal phalanx of the great toe is equivocal..    Suppurative flexor tenosynovitis.  An ulcer is present to the plantar aspect of the forefoot with 4.5 cm of necrotic tissue present in the ulcer base.  This is nicely demonstrated on image 19 of series 9.    Myositis to the anterior intrinsic muscles.  Cellulitis is present to the forefoot.      Electronically signed by: Kenneth Madsen  Date:    12/19/2022  Time:    17:16    Current Medications:     Infusions:         Scheduled:   aspirin  81 mg Oral Daily    atorvastatin  40 mg Oral Daily    ceFAZolin  2 g Intravenous Once    collagenase   Topical (Top) Daily    divalproex  500 mg Oral Daily    DULoxetine  30 mg Oral BID    enoxaparin  40 mg Subcutaneous Daily    fluticasone furoate-vilanteroL  1 puff Inhalation Daily    gabapentin  600 mg Oral TID    insulin detemir U-100  25 Units  Subcutaneous QHS    insulin detemir U-100  35 Units Subcutaneous Daily    miconazole   Topical (Top) BID        PRN:  albuterol-ipratropium, dextrose 10%, dextrose 10%, glucagon (human recombinant), glucose, glucose, HYDROcodone-acetaminophen, insulin aspart U-100, morphine, naloxone, sodium chloride 0.9%, sodium chloride 0.9%    Antibiotics and Day Number of Therapy:  None      Intake/Output Summary (Last 24 hours) at 12/27/2022 1005  Last data filed at 12/27/2022 0822  Gross per 24 hour   Intake 480 ml   Output 2026 ml   Net -1546 ml         Lines/Drains/Airways       Peripheral Intravenous Line  Duration                  Peripheral IV - Single Lumen 12/23/22 2124 20 G Anterior;Right Forearm 3 days                  Assessment & Plan:   Diabetic Ulcers, Bilateral  Hx of Osteomyelitis of the Feet, Bilateral  Osteomyelitis and Septic arthritis, Left Foot S/P left BKA post-op day 1  Uncontrolled DMII  - HbA1c 11.3% at this time  - Has not taken insulin in over 1 year  - Worsening ulcers, left worse than right on plantar surfaces   - Strict glucose monitoring  - Decreased evening insulin to 25 units, taking 35 units now in AM. Attempting afternoon and evening control.  - Antibiotics stopped.  - CT foot without obvious osteomyelitis bilaterally  - MRI foot bilateral: Osteomyelitis, septic arthritis in left foot  - Surgery on board; BKA done 12/21/2022  - PRN Pain medications in place.  - Gabapentin 600mg on board for diabetic neuropathy, Duloxetine on board  - Wound care consulted  - Ordred PT and OT       Mild IMAN likely from glucosuric losses - resolved  - Renal indices improved      Chronic airway disease, likely COPD no PFTs at this time  Hx of Tobacco Use   - DuoNebs scheduled q6h PRN  - Improved        CODE STATUS: Full Code  Access: Peripheral  Antibiotics: None  Diet: Diabetic  DVT Prophylaxis: Lovenox  GI Prophylaxis: none needed  Fluids: None      Disposition: Day 10 of spoke with PT requires placement in  rehab, pending insurance    Con Anaya MD  [facility] Internal Medicine PGY-1

## 2022-12-27 NOTE — PT/OT/SLP PROGRESS
Physical Therapy Treatment    Patient Name:  Con Arnold   MRN:  [id number]    Recommendations:     Discharge Recommendations: rehabilitation facility  Discharge Equipment Recommendations: walker, rolling, bath bench, bedside commode  Barriers to discharge:  severity of deficits; medical diagnosis    Assessment:     Con Arnold is a 62 y.o. male admitted with a medical diagnosis of Type 2 diabetes mellitus with left diabetic foot ulcer.  He presents with the following impairments/functional limitations: weakness, impaired endurance, impaired functional mobility, gait instability, impaired balance, visual deficits, pain, edema .    Rehab Prognosis: Good; patient would benefit from acute skilled PT services to address these deficits and reach maximum level of function.    Recent Surgery: Procedure(s) (LRB):  AMPUTATION, BELOW KNEE (Left) 6 Days Post-Op    Plan:     During this hospitalization, patient to be seen  (3-5x/wk) to address the identified rehab impairments via gait training, therapeutic activities, therapeutic exercises, wheelchair management/training and progress toward the following goals:    Plan of Care Expires:  01/21/23    Subjective     Chief Complaint: mild pain and swelling to L LE; phantom pains  Patient/Family Comments/goals: go to rehab  Pain/Comfort:  Pain Rating 1: 3/10  Location - Side 1: Left  Location - Orientation 1: lower  Location 1: leg  Pain Addressed 1: Nurse notified, Reposition, Cessation of Activity  Pain Rating Post-Intervention 1: 4/10  Location - Side 2: Left      Objective:     Communicated with nurse Bradley prior to session.  Patient found supine with peripheral IV upon PT entry to room.     General Precautions: Standard, fall, vision impaired  Orthopedic Precautions:  (L BKA)  Braces: Knee immobilizer  Respiratory Status: Room air     Functional Mobility:  Bed Mobility:     Rolling Right: modified independence  Supine to Sit: modified independence  Sit to Supine: modified  independence  Transfers:     Sit to Stand:  minimum assistance with rolling walker  Gait: 30' Min A with RW hopping on R LE; improved postural control; L knee immobilizer in place  Balance: Fair          Treatment & Education:  Bed mobility- Mod I  Transfers- Min A  Gait Min A with RW  Therapeutic activities and there ex in bed: Quad sets, SLR, hip abd/add  Pt education on therapeutic exercises and importance of knee extension.    Patient left supine with all lines intact, call button in reach, and nurse Kirk notified..    GOALS:   Multidisciplinary Problems       Physical Therapy Goals          Problem: Physical Therapy    Goal Priority Disciplines Outcome Goal Variances Interventions   Physical Therapy Goal     PT, PT/OT Ongoing, Progressing     Description: Goals to be met by: discharge     Patient will increase functional independence with mobility by performin. Supine to sit with Modified [place]  2. Sit to supine with Modified [place]  3. Sit to stand transfer with Stand-by Assistance- Progressing  4. Gait  x 50 feet with Minimal Assistance using Rolling Walker. - Progressing  5. Wheelchair propulsion x130 feet with Supervision using bilateral uppper extremities- Progressing                         Time Tracking:     PT Received On: 22  PT Start Time: 1035     PT Stop Time: 1058  PT Total Time (min): 23 min     Billable Minutes: Gait Training 10 and Therapeutic Activity 13    Treatment Type: Treatment  PT/PTA: PT           2022

## 2022-12-27 NOTE — PLAN OF CARE
Spoke to Alina with [facility], P: [phone]. They have received insurance authorization. Patient's transfer is pending bed availability and approval of MD at [facility]. Patient has been updated and states that he does want to go to rehab prior to going home. Dr. Anaya has been updated. Will follow.

## 2022-12-28 LAB
ALBUMIN SERPL-MCNC: 2.8 G/DL (ref 3.4–4.8)
ALBUMIN/GLOB SERPL: 0.7 RATIO (ref 1.1–2)
ALP SERPL-CCNC: 87 UNIT/L (ref 40–150)
ALT SERPL-CCNC: 23 UNIT/L (ref 0–55)
AST SERPL-CCNC: 32 UNIT/L (ref 5–34)
BASOPHILS # BLD AUTO: 0.08 X10(3)/MCL (ref 0–0.2)
BASOPHILS NFR BLD AUTO: 1.2 %
BILIRUBIN DIRECT+TOT PNL SERPL-MCNC: 0.7 MG/DL
BUN SERPL-MCNC: 29.7 MG/DL (ref 8.4–25.7)
CALCIUM SERPL-MCNC: 9.6 MG/DL (ref 8.8–10)
CHLORIDE SERPL-SCNC: 98 MMOL/L (ref 98–107)
CO2 SERPL-SCNC: 30 MMOL/L (ref 23–31)
CREAT SERPL-MCNC: 0.84 MG/DL (ref 0.73–1.18)
EOSINOPHIL # BLD AUTO: 0.15 X10(3)/MCL (ref 0–0.9)
EOSINOPHIL NFR BLD AUTO: 2.3 %
ERYTHROCYTE [DISTWIDTH] IN BLOOD BY AUTOMATED COUNT: 13.2 % (ref 11.6–14.4)
ESTROGEN SERPL-MCNC: NORMAL PG/ML
GFR SERPLBLD CREATININE-BSD FMLA CKD-EPI: >60 MLS/MIN/1.73/M2
GLOBULIN SER-MCNC: 4.1 GM/DL (ref 2.4–3.5)
GLUCOSE SERPL-MCNC: 98 MG/DL (ref 82–115)
HCT VFR BLD AUTO: 42 % (ref 42–52)
HGB BLD-MCNC: 13.9 GM/DL (ref 14–18)
IMM GRANULOCYTES # BLD AUTO: 0.17 X10(3)/MCL (ref 0–0.04)
IMM GRANULOCYTES NFR BLD AUTO: 2.6 %
INSULIN SERPL-ACNC: NORMAL U[IU]/ML
LAB AP CLINICAL INFORMATION: NORMAL
LAB AP GROSS DESCRIPTION: NORMAL
LAB AP REPORT FOOTNOTES: NORMAL
LYMPHOCYTES # BLD AUTO: 1.95 X10(3)/MCL (ref 0.6–4.6)
LYMPHOCYTES NFR BLD AUTO: 29.7 %
MCH RBC QN AUTO: 32.1 PG
MCHC RBC AUTO-ENTMCNC: 33.1 MG/DL (ref 33–36)
MCV RBC AUTO: 97 FL (ref 80–94)
MONOCYTES # BLD AUTO: 0.97 X10(3)/MCL (ref 0.1–1.3)
MONOCYTES NFR BLD AUTO: 14.8 %
NEUTROPHILS # BLD AUTO: 3.24 X10(3)/MCL (ref 2.1–9.2)
NEUTROPHILS NFR BLD AUTO: 49.4 %
NRBC BLD AUTO-RTO: 0 % (ref 0–1)
PLATELET # BLD AUTO: 214 X10(3)/MCL (ref 140–371)
PMV BLD AUTO: 10.8 FL (ref 9.4–12.4)
POCT GLUCOSE: 195 MG/DL (ref 70–110)
POCT GLUCOSE: 209 MG/DL (ref 70–110)
POCT GLUCOSE: 245 MG/DL (ref 70–110)
POCT GLUCOSE: 97 MG/DL (ref 70–110)
POTASSIUM SERPL-SCNC: 4.5 MMOL/L (ref 3.5–5.1)
PROT SERPL-MCNC: 6.9 GM/DL (ref 5.8–7.6)
RBC # BLD AUTO: 4.33 X10(6)/MCL (ref 4.7–6.1)
SODIUM SERPL-SCNC: 136 MMOL/L (ref 136–145)
T3RU NFR SERPL: NORMAL %
WBC # SPEC AUTO: 6.6 X10(3)/MCL (ref 4.5–11.5)

## 2022-12-28 PROCEDURE — 94640 AIRWAY INHALATION TREATMENT: CPT

## 2022-12-28 PROCEDURE — 25000242 PHARM REV CODE 250 ALT 637 W/ HCPCS

## 2022-12-28 PROCEDURE — 99900035 HC TECH TIME PER 15 MIN (STAT)

## 2022-12-28 PROCEDURE — 11000001 HC ACUTE MED/SURG PRIVATE ROOM

## 2022-12-28 PROCEDURE — 63600175 PHARM REV CODE 636 W HCPCS

## 2022-12-28 PROCEDURE — 36415 COLL VENOUS BLD VENIPUNCTURE: CPT

## 2022-12-28 PROCEDURE — 63600175 PHARM REV CODE 636 W HCPCS: Performed by: STUDENT IN AN ORGANIZED HEALTH CARE EDUCATION/TRAINING PROGRAM

## 2022-12-28 PROCEDURE — 94761 N-INVAS EAR/PLS OXIMETRY MLT: CPT

## 2022-12-28 PROCEDURE — 25000003 PHARM REV CODE 250

## 2022-12-28 PROCEDURE — 25000003 PHARM REV CODE 250: Performed by: STUDENT IN AN ORGANIZED HEALTH CARE EDUCATION/TRAINING PROGRAM

## 2022-12-28 PROCEDURE — 85025 COMPLETE CBC W/AUTO DIFF WBC: CPT

## 2022-12-28 PROCEDURE — 80053 COMPREHEN METABOLIC PANEL: CPT

## 2022-12-28 RX ADMIN — MORPHINE SULFATE 2 MG: 2 INJECTION, SOLUTION INTRAMUSCULAR; INTRAVENOUS at 10:12

## 2022-12-28 RX ADMIN — DIVALPROEX SODIUM 500 MG: 500 TABLET, FILM COATED, EXTENDED RELEASE ORAL at 08:12

## 2022-12-28 RX ADMIN — MICONAZOLE NITRATE: 20 CREAM TOPICAL at 09:12

## 2022-12-28 RX ADMIN — INSULIN ASPART 4 UNITS: 100 INJECTION, SOLUTION INTRAVENOUS; SUBCUTANEOUS at 05:12

## 2022-12-28 RX ADMIN — DULOXETINE 30 MG: 30 CAPSULE, DELAYED RELEASE ORAL at 08:12

## 2022-12-28 RX ADMIN — FLUTICASONE FUROATE AND VILANTEROL TRIFENATATE 1 PUFF: 100; 25 POWDER RESPIRATORY (INHALATION) at 07:12

## 2022-12-28 RX ADMIN — GABAPENTIN 600 MG: 300 CAPSULE ORAL at 08:12

## 2022-12-28 RX ADMIN — MORPHINE SULFATE 2 MG: 2 INJECTION, SOLUTION INTRAMUSCULAR; INTRAVENOUS at 01:12

## 2022-12-28 RX ADMIN — HYDROCODONE BITARTRATE AND ACETAMINOPHEN 1 TABLET: 5; 325 TABLET ORAL at 07:12

## 2022-12-28 RX ADMIN — MICONAZOLE NITRATE: 20 CREAM TOPICAL at 08:12

## 2022-12-28 RX ADMIN — INSULIN ASPART 4 UNITS: 100 INJECTION, SOLUTION INTRAVENOUS; SUBCUTANEOUS at 12:12

## 2022-12-28 RX ADMIN — ATORVASTATIN CALCIUM 40 MG: 20 TABLET, FILM COATED ORAL at 08:12

## 2022-12-28 RX ADMIN — INSULIN ASPART 1 UNITS: 100 INJECTION, SOLUTION INTRAVENOUS; SUBCUTANEOUS at 08:12

## 2022-12-28 RX ADMIN — GABAPENTIN 600 MG: 300 CAPSULE ORAL at 02:12

## 2022-12-28 RX ADMIN — ENOXAPARIN SODIUM 40 MG: 40 INJECTION SUBCUTANEOUS at 05:12

## 2022-12-28 RX ADMIN — COLLAGENASE SANTYL: 250 OINTMENT TOPICAL at 09:12

## 2022-12-28 RX ADMIN — INSULIN DETEMIR 25 UNITS: 100 INJECTION, SOLUTION SUBCUTANEOUS at 08:12

## 2022-12-28 RX ADMIN — MORPHINE SULFATE 2 MG: 2 INJECTION, SOLUTION INTRAMUSCULAR; INTRAVENOUS at 08:12

## 2022-12-28 RX ADMIN — IPRATROPIUM BROMIDE AND ALBUTEROL SULFATE 3 ML: 2.5; .5 SOLUTION RESPIRATORY (INHALATION) at 07:12

## 2022-12-28 RX ADMIN — MORPHINE SULFATE 2 MG: 2 INJECTION, SOLUTION INTRAMUSCULAR; INTRAVENOUS at 02:12

## 2022-12-28 RX ADMIN — ASPIRIN 81 MG CHEWABLE TABLET 81 MG: 81 TABLET CHEWABLE at 08:12

## 2022-12-28 NOTE — PLAN OF CARE
Spoke to Freddy at [facility]. He stated that they don't have a bed today, but will tomorrow, so will plan for transfer tomorrow morning. Clinical updates sent via [facility]. Will follow.

## 2022-12-28 NOTE — PROGRESS NOTES
[facility]   Progress Note     Resident Team: [facility] List 1  Attending Physician: Evie Burger MD  Resident: Rl Kang MD  Intern: Maribeth Gaines MD     Hospital Length of Stay: 11 days    Subjective:      Brief HPI:  Con Arnold is a 62 y.o. male who with a history of chronic osteomyelitis of the foot bilaterally on suppresive doxycycline, htn, uncontrolled DMII, who presented to [facility] ED on 12/16/2022  with complaint of bilateral foot pain, ulcers.     Patient has hx of osteomyelitis of the foot with numerous admissions for antibiotic treatment. Patients was followed in ID and was on suppressive doxycycline to which he has not taken in 2 months. He states he has run out of supplies for cleaning his foot ulcers, and states he is homeless. Patient was admitted in [place] where he received 1 month of antibiotics in July, and then another month in august in [place] where he had a toe and metatarsals removed from his right foot. He states that it helped however he has recently moved back. His feet are worse over the past month, he has pain though still is able to ambulate. He is with his son and they are trying to get housing although they have been running into difficulties. Patient sought consult due to unbearable pain, will admit for abx, wound assessment and treatment, and blood sugar control.     Of note he has not had insulin in over a year.    Interval History: Day 7 s/p left BKA. VSS. NAEON. Patient is doing well. Patient has been doing well, and is agreeable to rehab facility upon discharge. Plans for [facility] tomorrow per case management.       Review of Systems   Constitutional:  Negative for chills, fever and weight loss.   Respiratory:  Negative for cough and wheezing.    Cardiovascular:  Negative for chest pain and palpitations.   Gastrointestinal:  Negative for abdominal pain, nausea and vomiting.   Musculoskeletal:  Negative for myalgias.        Phantom limb pain  left foot, LEFT BKA        Objective:     Vital Signs (Most Recent):  Temp: 97.4 °F (36.3 °C) (12/28/22 0758)  Pulse: 66 (12/28/22 1141)  Resp: 20 (12/28/22 1457)  BP: 136/82 (12/28/22 1141)  SpO2: 96 % (12/28/22 1141)   Vital Signs (24h Range):  Temp:  [97.4 °F (36.3 °C)-98.5 °F (36.9 °C)] 97.4 °F (36.3 °C)  Pulse:  [50-82] 66  Resp:  [16-20] 20  SpO2:  [90 %-97 %] 96 %  BP: (113-142)/(69-82) 136/82     Physical Examination:  Constitutional:       General: He is not in acute distress.     Appearance: Normal appearance. He is not ill-appearing.   HENT:      Head: Normocephalic and atraumatic.      Nose: Nose normal.   Eyes:      Extraocular Movements: Extraocular movements intact.      Conjunctiva/sclera: Conjunctivae normal.      Pupils: Pupils are equal, round, and reactive to light.   Cardiovascular:      Rate and Rhythm: Normal rate and regular rhythm.   Pulmonary:      Effort: Pulmonary effort is normal. No respiratory distress.      Breath sounds: CTAB     Comments: Breath sounds distant, no wheezing  Abdominal:      General: Abdomen is flat.      Palpations: Abdomen is soft.   Musculoskeletal:         General:  Left BKA, with brace and non soaked bandages present. Normal range of motion.   Neurological:      Mental Status: He is alert.      Sensory: Sensory deficit present.   Psychiatric:         Mood and Affect: Mood normal.         Behavior: Behavior normal.      Laboratory:  Most Recent Data:  CBC:   Recent Labs   Lab 12/27/22  0420 12/28/22  0429   WBC 4.9 6.6   HGB 14.8 13.9*   HCT 44.2 42.0    214       CMP:   Recent Labs   Lab 12/28/22  0431   CALCIUM 9.6   ALBUMIN 2.8*      K 4.5   CO2 30   BUN 29.7*   CREATININE 0.84   ALKPHOS 87   ALT 23   AST 32   BILITOT 0.7         Radiology:  Imaging Results              CT Foot W W/O Contrast Bilateral (Final result)  Result time 12/17/22 10:04:45      Final result by Ayse Martinez MD (12/17/22 10:04:45)                   Impression:      1.  No definite acute fracture or acute erosive bone changes.  2. Chronic appearing changes at the left 2nd MTP joint.  3. Bilateral plantar surface soft tissue ulcerations and blisters.  Otherwise no deep drainable fluid collection.  4. 3 mm radiopaque foreign body in the plantar surface underlying the right 1st metatarsal head.      Electronically signed by: Ayse Martinez  Date:    12/17/2022  Time:    10:04               Narrative:    EXAMINATION:  CT FOOT W W/O CONTRAST BILATERAL    CLINICAL HISTORY:  Foot pain, chronic, osseous injury suspected;Diabetic Foot, r/o osteo;    TECHNIQUE:  CT images of the bilateral feet with and without contrast.  Axial, coronal, and sagittal reformatted images were obtained. Dose length product is 143 mGycm. Automatic exposure control, adjustment of mA/kV or iterative reconstruction technique was used to limit radiation dose.    COMPARISON:  X-rays dated 12/16/2022    FINDINGS:  Right foot:    There is no acute fracture identified.  There are no definite acute erosive bone changes.    There is a soft tissue ulceration along the plantar surface underlying the 3rd metatarsal head, with blister at the skin surface measuring 5 x 23 mm in size (series 14, image 32).  There is otherwise no drainable fluid collection.  There is a 3 mm radiopaque foreign body in the plantar surface underlying the 1st metatarsal head.    Left foot:    There is no definite acute fracture identified.  There is dorsal subluxation at the 2nd and 3rd MTP joints.  There are chronic appearing changes at the 2nd MTP joint.  There are no definite acute erosive changes identified.    There is plantar soft tissue ulceration with blister at the skin surface underlying the 2nd and 3rd metatarsal heads.  There is otherwise no drainable fluid collection identified.  There is no radiopaque foreign body.                                       X-Ray Foot Complete Right (Final result)  Result time 12/17/22 10:43:42      Final  result by Yoly Mendez MD (12/17/22 10:43:42)                   Impression:      No change since prior      Electronically signed by: Yoly Mendez  Date:    12/17/2022  Time:    10:43               Narrative:    EXAMINATION:  XR FOOT COMPLETE 3 VIEW RIGHT    CLINICAL HISTORY:  . Diabetes mellitus due to underlying condition with foot ulcer    TECHNIQUE:  AP, lateral, and oblique views of the right foot were performed.    COMPARISON:  11/11/2022    FINDINGS:  Patient is status post amputation of the distal 2nd metatarsal.  There is a radiopaque foreign body seen at the base of the 1st toe.  It is unchanged since prior examination.  No fracture seen.  No dislocation is seen.                                       X-Ray Foot Complete Left (Final result)  Result time 12/17/22 10:36:22      Final result by Audi Giles MD (12/17/22 10:36:22)                   Impression:      Degenerative changes.    Dislocations slight subluxation of the 2nd and 3rd metatarsophalangeal joints.    Soft tissue ulceration.    No clear evidence of osteomyelitis.      Electronically signed by: Audi Giles  Date:    12/17/2022  Time:    10:36               Narrative:    EXAMINATION:  XR FOOT COMPLETE 3 VIEW LEFT    CLINICAL HISTORY:  Diabetes mellitus due to underlying condition with foot ulcer    COMPARISON:  None.    FINDINGS:  No acute displaced fractures or dislocations.    There are some degenerative changes of the proximal and distal interphalangeal joints with a slight hilus valgus deformity    There are persistent dislocations/subluxations involving the 2nd and 3rd metatarsophalangeal joints which appears to be unchanged as compared with the previous exam    There might be soft tissue disruption indicating the presence of an ulceration, however, on the provided images there is no evidence of primary or secondary signs to suggest the presence of osteomyelitis other imaging modalities might prove helpful for  further assessment                                       X-Ray Chest PA And Lateral (Final result)  Result time 12/17/22 10:28:04      Final result by Audi Giles MD (12/17/22 10:28:04)                   Impression:      No acute chest disease is identified.      Electronically signed by: Audi Giles  Date:    12/17/2022  Time:    10:28               Narrative:    EXAMINATION:  XR CHEST PA AND LATERAL    CLINICAL HISTORY:  , Cough, unspecified.    COMPARISON:  October 26, 2021    FINDINGS:  No alveolar consolidation, effusion, or pneumothorax is seen.   The thoracic aorta is normal  cardiac silhouette, central pulmonary vessels and mediastinum are normal in size and are grossly unremarkable.   visualized osseous structures are grossly unremarkable.                                      MRI Foot (Forefoot) Left W W/O Contrast 12/19/2022    Narrative  EXAMINATION:  MRI FOOT (FOREFOOT) LEFT W W/O CONTRAST    CLINICAL HISTORY:  Foot swelling, diabetic, osteomyelitis suspected, xray done;    TECHNIQUE:  Enhanced and unenhanced, multiplanar, multisequence MR imaging of the left foot was obtained.    COMPARISON:  MRI contralateral foot used for comparison dated 12/19/2022.  CT scan used for comparison dated 12/17/2022    FINDINGS:  Incongruent hallux valgus deformity.  Trabecular edema present to the distal phalanx of the great toe is equivocal.  Trabecular edema and an effusion present to the 1st metatarsophalangeal joint may represent early osteomyelitis.  Destructive osteomyelitis and septic arthritis are present to the 2nd and 3rd metatarsophalangeal joints with dorsal dislocation of the bases of the proximal phalanges and full-thickness tears of the plantar plates.  The flexor tendon of the 2nd toe is largely torn.  The 3rd flexor tendon is medially dislocated but appears structurally intact.  An abscess is present about the 3rd metatarsal head and measures 3.5 cm x 2 cm in size.  It likely represents  decompressed septic arthritis.    Myositis is present to the anterior intrinsic muscles.  An ulcer is present to the plantar aspect of the forefoot with necrotic tissue present in the ulcer base.  This measures approximately 4.5 cm x 3 cm and is nicely demonstrated on image 19 of series 9.  Suppurative flexor tenosynovitis is present to the midfoot.    Impression  Destructive septic arthritis/osteomyelitis is present to the 2nd and 3rd tarsal metatarsal joints with dorsal dislocation of the bases of the proximal phalanges and full-thickness tearing of the plantar plates.  The 2nd flexor tendon is largely torn.  The 3rd flexor tendon is dislocated medially.  A 3.5 cm x 2 cm abscess is present about the 3rd metatarsal head and likely represents decompressed septic arthritis.    Septic arthritis is likely present to the 1st metatarsal phalangeal joint.  Edema and trace enhancement seen to the distal phalanx of the great toe is equivocal..    Suppurative flexor tenosynovitis.  An ulcer is present to the plantar aspect of the forefoot with 4.5 cm of necrotic tissue present in the ulcer base.  This is nicely demonstrated on image 19 of series 9.    Myositis to the anterior intrinsic muscles.  Cellulitis is present to the forefoot.      Electronically signed by: Kenneth Madsen  Date:    12/19/2022  Time:    17:16    Current Medications:     Infusions:         Scheduled:   aspirin  81 mg Oral Daily    atorvastatin  40 mg Oral Daily    ceFAZolin  2 g Intravenous Once    collagenase   Topical (Top) Daily    divalproex  500 mg Oral Daily    DULoxetine  30 mg Oral BID    enoxaparin  40 mg Subcutaneous Daily    fluticasone furoate-vilanteroL  1 puff Inhalation Daily    gabapentin  600 mg Oral TID    insulin detemir U-100  25 Units Subcutaneous QHS    insulin detemir U-100  35 Units Subcutaneous Daily    miconazole   Topical (Top) BID        PRN:  albuterol-ipratropium, dextrose 10%, dextrose 10%, glucagon (human recombinant),  glucose, glucose, HYDROcodone-acetaminophen, insulin aspart U-100, morphine, naloxone, sodium chloride 0.9%, sodium chloride 0.9%    Antibiotics and Day Number of Therapy:  None      Intake/Output Summary (Last 24 hours) at 12/28/2022 1515  Last data filed at 12/28/2022 0409  Gross per 24 hour   Intake 480 ml   Output 400 ml   Net 80 ml         Lines/Drains/Airways       Peripheral Intravenous Line  Duration                  Peripheral IV - Single Lumen 12/23/22 2124 20 G Anterior;Right Forearm 4 days                  Assessment & Plan:   Diabetic Ulcers, Bilateral  Hx of Osteomyelitis of the Feet, Bilateral  Osteomyelitis and Septic arthritis, Left Foot S/P left BKA post-op day 1  Uncontrolled DMII  - HbA1c 11.3% at this time  - Has not taken insulin in over 1 year  - Worsening ulcers, left worse than right on plantar surfaces   - Strict glucose monitoring  - Continue insulin regimen: taking 35 units AM, 25 units nightly. BG 98 this AM.   - Antibiotics stopped.  - CT foot without obvious osteomyelitis bilaterally  - MRI foot bilateral: Osteomyelitis, septic arthritis in left foot  - Surgery on board; BKA done 12/21/2022  - PRN Pain medications in place.  - Gabapentin 600mg on board for diabetic neuropathy, Duloxetine on board  - Wound care consulted  - Continue PT and OT       Mild IMAN likely from glucosuric losses - resolved  - Renal indices improved      Chronic airway disease, likely COPD no PFTs at this time  Hx of Tobacco Use   - DuoNebs scheduled q6h PRN  - Improved        CODE STATUS: Full Code  Access: Peripheral  Antibiotics: None  Diet: Diabetic  DVT Prophylaxis: Lovenox  GI Prophylaxis: none needed  Fluids: None      Disposition: Day 11 of spoke with PT requires placement in rehab. Accepted to [facility], likely discharge tomorrow.     Maribeth Gaines MD  [employer] Internal Medicine PGY-1

## 2022-12-28 NOTE — DISCHARGE SUMMARY
[facility] Discharge Summary    Admitting Physician: Basilia Vila MD  Attending Physician: Evie Burger MD  Date of Admit: 12/16/2022  Date of Discharge: 12/28/2022    Condition: Stable  Outcome: Patient tolerated treatment/procedure well without complication and is now ready for discharge.  DISPOSITION: Rehab Facility        Discharge Diagnoses:     Patient Active Problem List   Diagnosis    Type 2 diabetes mellitus with left diabetic foot ulcer    S/P BKA (below knee amputation) unilateral, left    Uncontrolled type 2 diabetes mellitus with hyperglycemia    PAD (peripheral artery disease)    Diabetic ulcer of foot associated with diabetes mellitus due to underlying condition, limited to breakdown of skin       Principal Problem:  Type 2 diabetes mellitus with left diabetic foot ulcer    Consultants and Procedures:     Consultants:  IP CONSULT TO HOSPITAL MEDICINE  WOUND CARE CONSULT  IP CONSULT TO GENERAL SURGERY  IP CONSULT TO SOCIAL WORK/CASE MANAGEMENT  WOUND CARE CONSULT  IP CONSULT TO SOCIAL WORK/CASE MANAGEMENT  IP CONSULT TO SOCIAL WORK/CASE MANAGEMENT    Procedures:   Procedure(s) (LRB):  AMPUTATION, BELOW KNEE (Left)      Brief Admission History:      Con Arnold is a 62 y.o. male who with a history of chronic osteomyelitis of the foot bilaterally on suppresive doxycycline, htn, uncontrolled DMII, who presented to [facility] ED on 12/16/2022  with complaint of bilateral foot pain, ulcers.  Patient has hx of osteomyelitis of the foot with numerous admissions for antibiotic treatment. Patients was followed in [place] and was on suppressive doxycycline to which he has not taken in 2 months. He states he has run out of supplies for cleaning his foot ulcers, and states he is homeless. Patient was admitted in [place] where he received 1 month of antibiotics in July, and then another month in august in [place] where he had a toe and metatarsals removed from his right foot. He states that it helped  however he has recently moved back. His feet are worse over the past month, he has pain though still is able to ambulate. He is with his son and they are trying to get housing although they have been running into difficulties. Patient sought consult due to unbearable pain, will admit for abx, wound assessment and treatment, and blood sugar control. Of note he has not had insulin in over a year.       Hospital Course with Pertinent Findings:      Patent was admitted to Internal Medicine for poorly controlled diabetes and bilateral foot ulcers. He was started on broad spectrum antibiotics with Vancomycin and Zosyn. MRI bilateral foot was obtained and revealed destructive septic arthritis/osteomyelitis in left foot. General surgery was consulted and performed left BKA. Patient tolerated surgery well, however complained of phantom limb pain throughout remainder of hospital stay. Pain well controlled with gabapentin and duloxetine. During hospital course, insulin was titrated to 30 units in the morning and 25 units at night with improved glycemic control.     Patient's stay was complicated by issues with rehab placement due to bed unavailability. Ultimately, case management was successful with placement at [facility]. Patient felt well upon discharge, vital signs stable, and pain was well controlled. He was transferred to rehab facility. He will follow up in post wards clinic and has instructions to follow up with Vascular Surgery.         Discharge physical exam:  Vitals  BP: 113/72  Temp: 97.4 °F (36.3 °C)  Temp src: Oral  Pulse: 63  Resp: 20  SpO2: (!) 93 %  Height: 6' (182.9 cm)  Weight: 104.4 kg (230 lb 2.6 oz)    Physical Examination:  Constitutional:       General: He is not in acute distress.     Appearance: Normal appearance. He is not ill-appearing.   HENT:      Head: Normocephalic and atraumatic.      Nose: Nose normal.   Eyes:      Extraocular Movements: Extraocular movements intact.       "Conjunctiva/sclera: Conjunctivae normal.      Pupils: Pupils are equal, round, and reactive to light.   Cardiovascular:      Rate and Rhythm: Normal rate and regular rhythm.   Pulmonary:      Effort: Pulmonary effort is normal. No respiratory distress.      Breath sounds: CTAB  Abdominal:      General: Abdomen is flat.      Palpations: Abdomen is soft.   Musculoskeletal:         General: Left BKA with brace. Normal range of motion.   Neurological:      Mental Status: He is alert.      Sensory: sensory deficit present.   Psychiatric:         Mood and Affect: Mood normal.         Behavior: Behavior normal.     TIME SPENT ON DISCHARGE: 35 minutes    Discharge Medications:         Medication List        START taking these medications      atorvastatin 40 MG tablet  Commonly known as: LIPITOR  Take 1 tablet (40 mg total) by mouth once daily.  Replaces: pravastatin 80 MG tablet     DULoxetine 30 MG capsule  Commonly known as: CYMBALTA  Take 1 capsule (30 mg total) by mouth 2 (two) times daily.     insulin detemir U-100 100 unit/mL injection  Commonly known as: Levemir  Inject twice a day: At Night 25 units, in the Morning 35 units. Monitor blood sugars at least 2 times a day.            CONTINUE taking these medications      albuterol 90 mcg/actuation inhaler  Commonly known as: PROVENTIL/VENTOLIN HFA     aspirin 81 MG EC tablet  Commonly known as: ECOTRIN  Take 1 tablet (81 mg total) by mouth once daily.     BD ULTRA-FINE SHORT PEN NEEDLE 31 gauge x 5/16" Ndle  Generic drug: pen needle, diabetic     BLOOD GLUCOSE TEST Strp  Generic drug: blood sugar diagnostic     DEPAKOTE  MG Tb24  Generic drug: divalproex  Take 1 tablet (500 mg total) by mouth once daily.     dicyclomine 20 mg tablet  Commonly known as: BENTYL     fluticasone-salmeterol 250-50 mcg/dose 250-50 mcg/dose diskus inhaler  Commonly known as: ADVAIR     gabapentin 300 MG capsule  Commonly known as: NEURONTIN     lisinopriL 10 MG tablet  Take 1 tablet " (10 mg total) by mouth once daily.            STOP taking these medications      fluticasone furoate-vilanteroL 100-25 mcg/dose diskus inhaler  Commonly known as: BREO ELLIPTA     insulin glargine 100 units/mL SubQ pen  Commonly known as: LANTUS SOLOSTAR U-100 INSULIN     insulin lispro 100 unit/mL pen     latanoprost 0.005 % ophthalmic solution     pravastatin 80 MG tablet  Commonly known as: PRAVACHOL  Replaced by: atorvastatin 40 MG tablet               Where to Get Your Medications        These medications were sent to [facility] [address] [facility]  [address] [place] [id number]      Phone: [phone]   atorvastatin 40 MG tablet  DULoxetine 30 MG capsule  insulin detemir U-100 100 unit/mL injection         Discharge Instructions:         Con Arnold is being discharged .    Discharge Procedure Orders   Ambulatory referral/consult to Internal Medicine   Standing Status: Future   Referral Priority: Routine Referral Type: Consultation   Referral Reason: Specialty Services Required   Requested Specialty: Internal Medicine   Number of Visits Requested: 1     Ambulatory referral/consult to Vascular Surgery   Standing Status: Future   Referral Priority: Routine Referral Type: Consultation   Referral Reason: Specialty Services Required   Requested Specialty: Vascular Surgery   Number of Visits Requested: 1        Follow-Up Appointments:   Follow-up Information       Erick Ponce III, IRVIN-CNP In 1 week.    Specialty: Family Medicine  Contact information:  [address] [facility] [address] [phone]               [facility] .    Specialty: Emergency Medicine  Why: If symptoms worsen  Contact information:  [address] [place] [id number] [phone]             [facility] Follow up on 1/9/2023.    Specialty: Internal Medicine  Why: Nurse will call and make  appointment.  Contact information:  [address] [facility] [address] [phone]                             To address at follow-up:  -The following labs are to be drawn at the [facility] visit: poct glucose   -The following imaging studies are to be ordered at the [facility] visit:     At this time, Con Arnold is determined to have maximized benefits of IP hospitalization. he is discharged in stable condition with OP f/u recommendations and instructions. All questions answered, and patient verbalized agreement with the POC. They were given return precautions prior to d/c including symptoms that should prompt return to ED or to call PCP. Total time spent of DC of 35 minutes.       Maribeth Gainse MD  [facility] Internal Medicine, HO-1

## 2022-12-29 LAB
ALBUMIN SERPL-MCNC: 2.8 G/DL (ref 3.4–4.8)
ALBUMIN/GLOB SERPL: 0.7 RATIO (ref 1.1–2)
ALP SERPL-CCNC: 85 UNIT/L (ref 40–150)
ALT SERPL-CCNC: 23 UNIT/L (ref 0–55)
AST SERPL-CCNC: 28 UNIT/L (ref 5–34)
BASOPHILS # BLD AUTO: 0.07 X10(3)/MCL (ref 0–0.2)
BASOPHILS NFR BLD AUTO: 1 %
BILIRUBIN DIRECT+TOT PNL SERPL-MCNC: 0.7 MG/DL
BUN SERPL-MCNC: 29.7 MG/DL (ref 8.4–25.7)
CALCIUM SERPL-MCNC: 9.8 MG/DL (ref 8.8–10)
CHLORIDE SERPL-SCNC: 99 MMOL/L (ref 98–107)
CO2 SERPL-SCNC: 29 MMOL/L (ref 23–31)
CREAT SERPL-MCNC: 0.82 MG/DL (ref 0.73–1.18)
EOSINOPHIL # BLD AUTO: 0.12 X10(3)/MCL (ref 0–0.9)
EOSINOPHIL NFR BLD AUTO: 1.8 %
ERYTHROCYTE [DISTWIDTH] IN BLOOD BY AUTOMATED COUNT: 13.2 % (ref 11.6–14.4)
GFR SERPLBLD CREATININE-BSD FMLA CKD-EPI: >60 MLS/MIN/1.73/M2
GLOBULIN SER-MCNC: 4.2 GM/DL (ref 2.4–3.5)
GLUCOSE SERPL-MCNC: 135 MG/DL (ref 82–115)
HCT VFR BLD AUTO: 42.2 % (ref 42–52)
HGB BLD-MCNC: 14.2 GM/DL (ref 14–18)
IMM GRANULOCYTES # BLD AUTO: 0.14 X10(3)/MCL (ref 0–0.04)
IMM GRANULOCYTES NFR BLD AUTO: 2.1 %
LYMPHOCYTES # BLD AUTO: 1.87 X10(3)/MCL (ref 0.6–4.6)
LYMPHOCYTES NFR BLD AUTO: 28 %
MCH RBC QN AUTO: 32.5 PG
MCHC RBC AUTO-ENTMCNC: 33.6 MG/DL (ref 33–36)
MCV RBC AUTO: 96.6 FL (ref 80–94)
MONOCYTES # BLD AUTO: 0.91 X10(3)/MCL (ref 0.1–1.3)
MONOCYTES NFR BLD AUTO: 13.6 %
NEUTROPHILS # BLD AUTO: 3.56 X10(3)/MCL (ref 2.1–9.2)
NEUTROPHILS NFR BLD AUTO: 53.5 %
NRBC BLD AUTO-RTO: 0 % (ref 0–1)
PLATELET # BLD AUTO: 196 X10(3)/MCL (ref 140–371)
PMV BLD AUTO: 11.4 FL (ref 9.4–12.4)
POCT GLUCOSE: 130 MG/DL (ref 70–110)
POCT GLUCOSE: 134 MG/DL (ref 70–110)
POCT GLUCOSE: 163 MG/DL (ref 70–110)
POCT GLUCOSE: 218 MG/DL (ref 70–110)
POCT GLUCOSE: 245 MG/DL (ref 70–110)
POTASSIUM SERPL-SCNC: 4.6 MMOL/L (ref 3.5–5.1)
PROT SERPL-MCNC: 7 GM/DL (ref 5.8–7.6)
RBC # BLD AUTO: 4.37 X10(6)/MCL (ref 4.7–6.1)
SODIUM SERPL-SCNC: 137 MMOL/L (ref 136–145)
WBC # SPEC AUTO: 6.7 X10(3)/MCL (ref 4.5–11.5)

## 2022-12-29 PROCEDURE — 25000003 PHARM REV CODE 250

## 2022-12-29 PROCEDURE — 63600175 PHARM REV CODE 636 W HCPCS: Performed by: STUDENT IN AN ORGANIZED HEALTH CARE EDUCATION/TRAINING PROGRAM

## 2022-12-29 PROCEDURE — 94640 AIRWAY INHALATION TREATMENT: CPT

## 2022-12-29 PROCEDURE — 94761 N-INVAS EAR/PLS OXIMETRY MLT: CPT

## 2022-12-29 PROCEDURE — 80053 COMPREHEN METABOLIC PANEL: CPT

## 2022-12-29 PROCEDURE — 36415 COLL VENOUS BLD VENIPUNCTURE: CPT

## 2022-12-29 PROCEDURE — 85025 COMPLETE CBC W/AUTO DIFF WBC: CPT

## 2022-12-29 PROCEDURE — 94760 N-INVAS EAR/PLS OXIMETRY 1: CPT

## 2022-12-29 PROCEDURE — 97530 THERAPEUTIC ACTIVITIES: CPT

## 2022-12-29 PROCEDURE — 99900035 HC TECH TIME PER 15 MIN (STAT)

## 2022-12-29 PROCEDURE — 11000001 HC ACUTE MED/SURG PRIVATE ROOM

## 2022-12-29 PROCEDURE — 63600175 PHARM REV CODE 636 W HCPCS

## 2022-12-29 RX ORDER — DOCUSATE SODIUM 100 MG/1
100 CAPSULE, LIQUID FILLED ORAL DAILY
Status: DISCONTINUED | OUTPATIENT
Start: 2022-12-29 | End: 2022-12-30 | Stop reason: HOSPADM

## 2022-12-29 RX ADMIN — GABAPENTIN 600 MG: 300 CAPSULE ORAL at 09:12

## 2022-12-29 RX ADMIN — MORPHINE SULFATE 2 MG: 2 INJECTION, SOLUTION INTRAMUSCULAR; INTRAVENOUS at 08:12

## 2022-12-29 RX ADMIN — ASPIRIN 81 MG CHEWABLE TABLET 81 MG: 81 TABLET CHEWABLE at 09:12

## 2022-12-29 RX ADMIN — DULOXETINE 30 MG: 30 CAPSULE, DELAYED RELEASE ORAL at 09:12

## 2022-12-29 RX ADMIN — INSULIN ASPART 4 UNITS: 100 INJECTION, SOLUTION INTRAVENOUS; SUBCUTANEOUS at 01:12

## 2022-12-29 RX ADMIN — INSULIN DETEMIR 25 UNITS: 100 INJECTION, SOLUTION SUBCUTANEOUS at 09:12

## 2022-12-29 RX ADMIN — MORPHINE SULFATE 2 MG: 2 INJECTION, SOLUTION INTRAMUSCULAR; INTRAVENOUS at 03:12

## 2022-12-29 RX ADMIN — ATORVASTATIN CALCIUM 40 MG: 20 TABLET, FILM COATED ORAL at 09:12

## 2022-12-29 RX ADMIN — MICONAZOLE NITRATE: 20 CREAM TOPICAL at 09:12

## 2022-12-29 RX ADMIN — INSULIN DETEMIR 35 UNITS: 100 INJECTION, SOLUTION SUBCUTANEOUS at 09:12

## 2022-12-29 RX ADMIN — COLLAGENASE SANTYL: 250 OINTMENT TOPICAL at 09:12

## 2022-12-29 RX ADMIN — DOCUSATE SODIUM 100 MG: 100 CAPSULE, LIQUID FILLED ORAL at 04:12

## 2022-12-29 RX ADMIN — FLUTICASONE FUROATE AND VILANTEROL TRIFENATATE 1 PUFF: 100; 25 POWDER RESPIRATORY (INHALATION) at 09:12

## 2022-12-29 RX ADMIN — INSULIN ASPART 2 UNITS: 100 INJECTION, SOLUTION INTRAVENOUS; SUBCUTANEOUS at 09:12

## 2022-12-29 RX ADMIN — DIVALPROEX SODIUM 500 MG: 500 TABLET, FILM COATED, EXTENDED RELEASE ORAL at 09:12

## 2022-12-29 RX ADMIN — MORPHINE SULFATE 2 MG: 2 INJECTION, SOLUTION INTRAMUSCULAR; INTRAVENOUS at 02:12

## 2022-12-29 RX ADMIN — ENOXAPARIN SODIUM 40 MG: 40 INJECTION SUBCUTANEOUS at 04:12

## 2022-12-29 RX ADMIN — MORPHINE SULFATE 2 MG: 2 INJECTION, SOLUTION INTRAMUSCULAR; INTRAVENOUS at 09:12

## 2022-12-29 RX ADMIN — GABAPENTIN 600 MG: 300 CAPSULE ORAL at 02:12

## 2022-12-29 NOTE — PLAN OF CARE
Spoke with Alina at [facility]. She stated that she is unsure of when a bed will definitely be available. Patient will remain on their waiting list, but referral sent to [facility]. Spoke to Kenia there and she will work on reviewing patient's case and if accepted, will try to get insurance authorization transferred to them. Reached out to Lavonne with [facility], who stated that their MD declined patient. Will follow.

## 2022-12-29 NOTE — PROGRESS NOTES
"Inpatient Nutrition Evaluation    Admit Date: 12/16/2022   Total duration of encounter: 13 days    Nutrition Recommendation/Prescription     Diabetic, double protein diet  Boost Max once daily to provide 30 gm protein  Donaldo BID to provide Vit C, Zinc, Arginine for wound healing  Monitor Weights Weekly     Nutrition Assessment     Chart Review    Reason Seen: continuous nutrition monitoring and follow-up    Diagnosis:  Oseto & septic arthritis of left foot s/p Left BKA; Kiko Diabetic Ulcers, h/o osteo of kiko feet, Uncontrolled DM, Mild IMAN, Chronic airway disease likely COPD, h/o tobacco use    Relevant Medical History: Chronic osteomyelitis of kiko feet, HTN, Uncontrolled DM    Nutrition-Related Medications: ASA, Atorvastatin, Insulin    Nutrition-Related Labs:  12/16/22 -- Hgb A1C 11.3 H  12/22 -- K4.4, BUN 24, Cr 1, Glu 223 H  12/29/22 -- K 4.6, BUN 29.7 H, Cr 0.82, Glu 135 H    Diet Order: Diet diabetic  Oral Supplement Order: Boost Max and Donaldo  Appetite/Oral Intake: fair/% of meals  Factors Affecting Nutritional Intake: decreased appetite  Food/Muslim/Cultural Preferences: none reported  Food Allergies: none reported    Skin Integrity: wound, intact, other (see comments), incision (Lt STEFFANIE incision and wound to Rt foot. 2nd digit amputated)  Wound(s):   Kiko Diabetic foot ulcers; s/p Left BKA    Comments    12/19/22 -- Pt reports fair appetite however stating "I force myself to eat" also reports being homeless since Sept of 2020; no indication of significant wt loss; pt with diabetic foot ulcers, pt asking for double protein -- will also order Boost Max & Donaldo to aid in wound healing    12/22/22 -- Pt continues with fair appetite however reporting eating most of his meals, drinking Boost & Donaldo to provide increased a nutrition for wound healing s/p Left BKA; New wt taken via bed elevated, will continue to monitor weekly for accuracy; Glu (H) - continue diabetic diet for best glucose control    12/29/22 " -- Pt continues to reports fair appetite but eating most of his meals & drinking Boost & Donaldo for wound healing; LBM 12/27; no n/v; plans for Rehab transfer today noted; wts stable since admit    Anthropometrics    Height: 6' (182.9 cm) Height Method: Stated  Last Weight: 97.7 kg (215 lb 6.2 oz) (12/29/22 1130) Weight Method: Bed Scale  BMI (Calculated): 29.2  BMI Classification: overweight (BMI 25-29.9)        Ideal Body Weight (IBW), Male: 178 lb     % Ideal Body Weight, Male (lb): 117.72 %                          Usual Weight Provided By: patient denies unintentional weight loss    Wt Readings from Last 5 Encounters:   12/29/22 97.7 kg (215 lb 6.2 oz)   11/11/22 95.1 kg (209 lb 10.5 oz)   06/06/22 96.6 kg (213 lb)   11/18/21 101.3 kg (223 lb 5.2 oz)     Weight Change(s) Since Admission:  Admit Weight: 95.1 kg (209 lb 8.8 oz) (12/16/22 2154)  No indication of significant wt loss in the last 6 months, will continue to monitor  12/16/22 -- 95.1 kg  12/17/22 -- 94.8 kg  12/22/22 -- 104.4 kg, bed wt obtained during visit, will monitor weekly for accuracy  12/29/22 -- 97.7 kg    Patient Education    Not applicable.    Monitoring & Evaluation     Dietitian will monitor food and beverage intake, weight change, electrolyte/renal panel, glucose/endocrine profile, and gastrointestinal profile.  Nutrition Risk/Follow-Up: low (follow-up in 5-7 days)  Patients assigned 'low nutrition risk' status do not qualify for a full nutritional assessment but will be monitored and re-evaluated in a 5-7 day time period. Please consult if re-evaluation needed sooner.

## 2022-12-29 NOTE — PT/OT/SLP PROGRESS
Occupational Therapy  Treatment    Con Arnold   MRN: [id number]   Admitting Diagnosis: Type 2 diabetes mellitus with left diabetic foot ulcer, s/p L BKA on 12/21    OT Date of Treatment: 12/29/22   OT Start Time: 1530  OT Stop Time: 1547  OT Total Time (min): 17 min    Billable Minutes:  Therapeutic Activity 17    OT/ALICE: OT          General Precautions: Standard, fall, vision impaired  Orthopedic Precautions: N/A  Braces: Knee immobilizer  Respiratory Status: Room air         Subjective:  Communicated with  prior to session.  Pt and  stated pt's goal is to DC to rehab and then ultimately to 2nd floor apartment with his son.  From a therapy standpoint pt will require a first floor dwelling for safe access.  Pt stated he will speak to his son about changing to a first floor apartment but in the meantime his plan is to scoot up and down stairs in a seated position.  Pain/Comfort  Pain Rating 1: 4/10  Location - Side 1: Left  Location 1: leg  Pain Addressed 1: Reposition, Distraction, Cessation of Activity    Objective:  Patient found with: peripheral IV     Functional Mobility:  Bed Mobility: mod I rolling and tf supine to sit/sit to supine       Transfers:  SBA sit to stand from EOB to RW, however unable to tolerate standing for alternating UE reaching exercises d/t c/o R foot pain with weightbearing and pt required tf back to sitting for BUE exercises  Pt demonstrated tf EOB to BSC at his L side with SBA for safety d/t fall risk, without DME        Functional Ambulation: NT    Activities of Daily Living:     Pt demonstrated pulling pants down from waist in partial standing over BSC with SBA.    Therapeutic Activities and Exercises:  Pt participated in BUE AROM and reaching exercises seated EOB to build UB strength and improve trunk/postural control in unsupported seating for better safety during ADLs EOB.    AM-PAC 6 CLICK ADL   How much help from another person does this patient currently  "need?   1 = Unable, Total/Dependent Assistance  2 = A lot, Maximum/Moderate Assistance  3 = A little, Minimum/Contact Guard/Supervision  4 = None, Modified [place]/Independent          AM-PAC Raw Score CMS "G-Code Modifier Level of Impairment Assistance   6 % Total / Unable   7 - 8 CM 80 - 100% Maximal Assist   9-13 CL 60 - 80% Moderate Assist   14 - 19 CK 40 - 60% Moderate Assist   20 - 22 CJ 20 - 40% Minimal Assist   23 CI 1-20% SBA / CGA   24 CH 0% Independent/ Mod I       Patient left  seated at [facility]  with call button in reach and OT educated pt to call CNA/nurse for assist when finished.    ASSESSMENT:  Con Arnold is a 62 y.o. male with a medical diagnosis of Type 2 diabetes mellitus with left diabetic foot ulcer and presents with c/o R foot pain and wound at ball of R foot limiting endurance for standing on R foot (L BKA as of 1 week ago), good motivation but poor insight for DC planning.    Rehab identified problem list/impairments:  weakness, impaired endurance, impaired sensation, impaired self care skills, impaired functional mobility, visual deficits, impaired cognition, decreased lower extremity function, pain, decreased ROM, edema    Rehab potential is good.    Activity tolerance: Good    Discharge recommendations: rehabilitation facility   Barriers to discharge:      Equipment recommendations: walker, rolling, bath bench, bedside commode, wheelchair    GOALS:   Multidisciplinary Problems       Occupational Therapy Goals          Problem: Occupational Therapy    Goal Priority Disciplines Outcome Interventions   Occupational Therapy Goal     OT, PT/OT Ongoing, Progressing    Description: Goals to be met by: d/c     Patient will increase functional independence with ADLs by performing:    LE Dressing with Modified [place].  Sitting at edge of bed x 30 minutes for meals with [place] and no c/o fatigue or LOB.  Rolling to Bilateral with Modified [place].   Supine to sit with " Modified [place].  Toilet transfer to drop arm bedside commode with Modified [place].                         Plan:  Patient to be seen 2 x/week, 5 x/week (M-F) to address the above listed problems via self-care/home management, therapeutic activities, therapeutic exercises  Plan of Care expires:  (d/c)  Plan of Care reviewed with: patient         12/29/2022

## 2022-12-30 VITALS
OXYGEN SATURATION: 97 % | WEIGHT: 215.38 LBS | HEART RATE: 66 BPM | HEIGHT: 72 IN | SYSTOLIC BLOOD PRESSURE: 138 MMHG | RESPIRATION RATE: 20 BRPM | TEMPERATURE: 98 F | BODY MASS INDEX: 29.17 KG/M2 | DIASTOLIC BLOOD PRESSURE: 85 MMHG

## 2022-12-30 LAB
ALBUMIN SERPL-MCNC: 2.9 G/DL (ref 3.4–4.8)
ALBUMIN/GLOB SERPL: 0.7 RATIO (ref 1.1–2)
ALP SERPL-CCNC: 84 UNIT/L (ref 40–150)
ALT SERPL-CCNC: 19 UNIT/L (ref 0–55)
AST SERPL-CCNC: 27 UNIT/L (ref 5–34)
BASOPHILS # BLD AUTO: 0.07 X10(3)/MCL (ref 0–0.2)
BASOPHILS NFR BLD AUTO: 1 %
BILIRUBIN DIRECT+TOT PNL SERPL-MCNC: 0.7 MG/DL
BUN SERPL-MCNC: 29.8 MG/DL (ref 8.4–25.7)
CALCIUM SERPL-MCNC: 9.5 MG/DL (ref 8.8–10)
CHLORIDE SERPL-SCNC: 100 MMOL/L (ref 98–107)
CO2 SERPL-SCNC: 27 MMOL/L (ref 23–31)
CREAT SERPL-MCNC: 0.81 MG/DL (ref 0.73–1.18)
EOSINOPHIL # BLD AUTO: 0.15 X10(3)/MCL (ref 0–0.9)
EOSINOPHIL NFR BLD AUTO: 2.1 %
ERYTHROCYTE [DISTWIDTH] IN BLOOD BY AUTOMATED COUNT: 13.2 % (ref 11.6–14.4)
GFR SERPLBLD CREATININE-BSD FMLA CKD-EPI: >60 MLS/MIN/1.73/M2
GLOBULIN SER-MCNC: 4 GM/DL (ref 2.4–3.5)
GLUCOSE SERPL-MCNC: 81 MG/DL (ref 82–115)
HCT VFR BLD AUTO: 40.9 % (ref 42–52)
HGB BLD-MCNC: 13.6 GM/DL (ref 14–18)
IMM GRANULOCYTES # BLD AUTO: 0.12 X10(3)/MCL (ref 0–0.04)
IMM GRANULOCYTES NFR BLD AUTO: 1.7 %
LYMPHOCYTES # BLD AUTO: 1.87 X10(3)/MCL (ref 0.6–4.6)
LYMPHOCYTES NFR BLD AUTO: 26.3 %
MCH RBC QN AUTO: 31.9 PG
MCHC RBC AUTO-ENTMCNC: 33.3 MG/DL (ref 33–36)
MCV RBC AUTO: 96 FL (ref 80–94)
MONOCYTES # BLD AUTO: 0.9 X10(3)/MCL (ref 0.1–1.3)
MONOCYTES NFR BLD AUTO: 12.6 %
NEUTROPHILS # BLD AUTO: 4.01 X10(3)/MCL (ref 2.1–9.2)
NEUTROPHILS NFR BLD AUTO: 56.3 %
NRBC BLD AUTO-RTO: 0 % (ref 0–1)
PLATELET # BLD AUTO: 215 X10(3)/MCL (ref 140–371)
PMV BLD AUTO: 10.5 FL (ref 9.4–12.4)
POCT GLUCOSE: 172 MG/DL (ref 70–110)
POCT GLUCOSE: 68 MG/DL (ref 70–110)
POTASSIUM SERPL-SCNC: 4.1 MMOL/L (ref 3.5–5.1)
PROT SERPL-MCNC: 6.9 GM/DL (ref 5.8–7.6)
RBC # BLD AUTO: 4.26 X10(6)/MCL (ref 4.7–6.1)
SODIUM SERPL-SCNC: 136 MMOL/L (ref 136–145)
WBC # SPEC AUTO: 7.1 X10(3)/MCL (ref 4.5–11.5)

## 2022-12-30 PROCEDURE — 97164 PT RE-EVAL EST PLAN CARE: CPT

## 2022-12-30 PROCEDURE — 25000003 PHARM REV CODE 250

## 2022-12-30 PROCEDURE — 63600175 PHARM REV CODE 636 W HCPCS

## 2022-12-30 PROCEDURE — 94761 N-INVAS EAR/PLS OXIMETRY MLT: CPT

## 2022-12-30 PROCEDURE — 36415 COLL VENOUS BLD VENIPUNCTURE: CPT

## 2022-12-30 PROCEDURE — 94640 AIRWAY INHALATION TREATMENT: CPT

## 2022-12-30 PROCEDURE — 63600175 PHARM REV CODE 636 W HCPCS: Performed by: STUDENT IN AN ORGANIZED HEALTH CARE EDUCATION/TRAINING PROGRAM

## 2022-12-30 PROCEDURE — 80053 COMPREHEN METABOLIC PANEL: CPT

## 2022-12-30 PROCEDURE — 94760 N-INVAS EAR/PLS OXIMETRY 1: CPT

## 2022-12-30 PROCEDURE — 99900035 HC TECH TIME PER 15 MIN (STAT)

## 2022-12-30 PROCEDURE — 85025 COMPLETE CBC W/AUTO DIFF WBC: CPT

## 2022-12-30 RX ADMIN — MICONAZOLE NITRATE: 20 CREAM TOPICAL at 08:12

## 2022-12-30 RX ADMIN — DULOXETINE 30 MG: 30 CAPSULE, DELAYED RELEASE ORAL at 08:12

## 2022-12-30 RX ADMIN — COLLAGENASE SANTYL: 250 OINTMENT TOPICAL at 08:12

## 2022-12-30 RX ADMIN — MORPHINE SULFATE 2 MG: 2 INJECTION, SOLUTION INTRAMUSCULAR; INTRAVENOUS at 06:12

## 2022-12-30 RX ADMIN — ATORVASTATIN CALCIUM 40 MG: 20 TABLET, FILM COATED ORAL at 08:12

## 2022-12-30 RX ADMIN — DOCUSATE SODIUM 100 MG: 100 CAPSULE, LIQUID FILLED ORAL at 08:12

## 2022-12-30 RX ADMIN — DIVALPROEX SODIUM 500 MG: 500 TABLET, FILM COATED, EXTENDED RELEASE ORAL at 08:12

## 2022-12-30 RX ADMIN — INSULIN ASPART 2 UNITS: 100 INJECTION, SOLUTION INTRAVENOUS; SUBCUTANEOUS at 12:12

## 2022-12-30 RX ADMIN — MORPHINE SULFATE 2 MG: 2 INJECTION, SOLUTION INTRAMUSCULAR; INTRAVENOUS at 01:12

## 2022-12-30 RX ADMIN — INSULIN DETEMIR 35 UNITS: 100 INJECTION, SOLUTION SUBCUTANEOUS at 08:12

## 2022-12-30 RX ADMIN — GABAPENTIN 600 MG: 300 CAPSULE ORAL at 08:12

## 2022-12-30 RX ADMIN — MORPHINE SULFATE 2 MG: 2 INJECTION, SOLUTION INTRAMUSCULAR; INTRAVENOUS at 11:12

## 2022-12-30 RX ADMIN — ASPIRIN 81 MG CHEWABLE TABLET 81 MG: 81 TABLET CHEWABLE at 08:12

## 2022-12-30 NOTE — PLAN OF CARE
12/30/22 1311   Medicare Message   Important Message from Medicare regarding Discharge Appeal Rights Given to patient/caregiver;Explained to patient/caregiver;Signed/date by patient/caregiver   Date IMM was signed 12/30/22   Time IMM was signed [id number]

## 2022-12-30 NOTE — PT/OT/SLP RE-EVAL
Physical Therapy Re-evaluation    Patient Name:  Con Arnold   MRN:  [id number]    Recommendations:     Discharge Recommendations: rehabilitation facility  Discharge Equipment Recommendations: walker, rolling, wheelchair, bedside commode, bath bench   Barriers to discharge: Inaccessible home and severity of deficits    Assessment:     Con Arnold is a 62 y.o. male admitted with a medical diagnosis of Type 2 diabetes mellitus with left diabetic foot ulcer.  He presents with the following impairments/functional limitations: weakness, impaired endurance, impaired sensation, impaired self care skills, impaired functional mobility, gait instability, impaired balance, visual deficits, decreased lower extremity function, pain, edema.    Rehab Prognosis:  Good; patient would benefit from acute skilled PT services to address these deficits and reach maximum level of function.      Recent Surgery: Procedure(s) (LRB):  AMPUTATION, BELOW KNEE (Left) 9 Days Post-Op    Plan:     During this hospitalization, patient to be seen  (3-5x/wk) to address the above listed problems via gait training, therapeutic activities, therapeutic exercises  Plan of Care Expires:  01/21/23  Plan of Care Reviewed with: patient    Subjective     Communicated with nurse Chin prior to session.  Patient found supine with peripheral IV and L knee immobilizer upon PT entry to room, agreeable to evaluation.      Chief Complaint: L LE pain; phantom pain 9/10  Patient comments/goals: go to rehab and home  Pain/Comfort:  Pain Rating 1: 9/10  Location - Side 1: Left  Location - Orientation 1: lower  Location 1: leg  Pain Addressed 1: Reposition, Cessation of Activity, Nurse notified    Patients cultural, spiritual, Spiritism conflicts given the current situation: no      Objective:     Patient found with: peripheral IV     General Precautions: Standard, fall, vision impaired  Orthopedic Precautions:  (Pt states his MD in [place] wants him NWB on the R LE  due to his foot wound. Pt is L BKA)  Braces: Knee immobilizer  Respiratory Status: Room air    Exams:  Cognitive Exam:  Patient is oriented to Person, Place, Time, and Situation  RLE ROM: WFL  RLE Strength: WFL  LLE ROM: WFL except ankle (L BKA)  LLE Strength: WFL in available joints    Functional Mobility:  Bed Mobility:     Rolling Left:  modified independence  Supine to Sit: modified independence  Sit to Supine: modified independence  Transfers:     Sit to Stand:  stand by assistance with rolling walker  Toilet Transfer: modified independence with  no AD  using  Squat Pivot  Gait: unable to ambulate. Pt states his MD from [place] wants him NWB on the R LE due to a foot wound.  He refused to ambulate today.  Balance: sitting WNL; standing Fair      Treatment and Education:   Bed mobility  Transfers  Education on donning and doffing knee immobilizer; importance of therapeutic exercise for ROM and strength.    Patient left supine with all lines intact, call button in reach, and nurse Elsy notified.    GOALS:   Multidisciplinary Problems       Physical Therapy Goals          Problem: Physical Therapy    Goal Priority Disciplines Outcome Goal Variances Interventions   Physical Therapy Goal     PT, PT/OT Ongoing, Progressing     Description: Goals to be met by: discharge     Patient will increase functional independence with mobility by performin. Supine to sit with Modified [place]- Met 2022  2. Sit to supine with Modified [place]- Met 2022  3. Sit to stand transfer with Stand-by Assistance- Met 2022  4. Gait  x 50 feet with Minimal Assistance using Rolling Walker. - Progressing  5. Wheelchair propulsion x130 feet with Supervision using bilateral uppper extremities- Progressing                         History:     Past Medical History:   Diagnosis Date    Blindness due to type 2 diabetes mellitus     Cataract     Diabetes mellitus, type 2     Glaucoma     Hyperlipidemia      Neuropathy     Osteomyelitis        Past Surgical History:   Procedure Laterality Date    APPENDECTOMY      BELOW KNEE AMPUTATION OF LOWER EXTREMITY Left 12/21/2022    Procedure: AMPUTATION, BELOW KNEE;  Surgeon: William Aguayo MD;  Location: [facility];  Service: General;  Laterality: Left;    CARPAL TUNNEL RELEASE Bilateral     FEMORAL ARTERY STENT Left     FOOT SURGERY         Time Tracking:     PT Received On: 12/30/22  PT Start Time: 0931     PT Stop Time: 0950  PT Total Time (min): 19 min     Billable Minutes: Re-eval 19 mins      12/30/2022

## 2022-12-30 NOTE — PLAN OF CARE
Received call from Alina at [facility]. They will send van to  patient at 2:30 PM. Our nurse should call report at 2:00 PM, P: [phone], and ask for the charge nurse. Accepting MD is Dr. Adams.

## 2022-12-30 NOTE — PLAN OF CARE
Received call from Alina at [facility] stating they may have a bed available today. Clinical updates sent via [employer].

## 2022-12-30 NOTE — PROGRESS NOTES
[facility] Medicine Wards   Progress Note     Resident Team: [facility] Medicine List 1  Attending Physician: Evie Burger MD  Resident: Rl Kang MD  Intern: Maribeth Gaines MD     Hospital Length of Stay: 12 days    Subjective:      Brief HPI:  Con Arnold is a 62 y.o. male who with a history of chronic osteomyelitis of the foot bilaterally on suppresive doxycycline, htn, uncontrolled DMII, who presented to [facility] ED on 12/16/2022  with complaint of bilateral foot pain, ulcers.     Patient has hx of osteomyelitis of the foot with numerous admissions for antibiotic treatment. Patients was followed in ID and was on suppressive doxycycline to which he has not taken in 2 months. He states he has run out of supplies for cleaning his foot ulcers, and states he is homeless. Patient was admitted in [place] where he received 1 month of antibiotics in July, and then another month in august in [place] where he had a toe and metatarsals removed from his right foot. He states that it helped however he has recently moved back. His feet are worse over the past month, he has pain though still is able to ambulate. He is with his son and they are trying to get housing although they have been running into difficulties. Patient sought consult due to unbearable pain, will admit for abx, wound assessment and treatment, and blood sugar control.     Of note he has not had insulin in over a year.    Interval History: Day 8 s/p left BKA. NAEON. Patient states he feels well this morning, however still complains of phantom limb pain. States gabapentin provides some relief.  Still waiting on rehab placement per Case Management.       Review of Systems   Constitutional:  Negative for chills, fever and weight loss.   Respiratory:  Negative for cough and wheezing.    Cardiovascular:  Negative for chest pain and palpitations.   Gastrointestinal:  Negative for abdominal pain, nausea and vomiting.   Musculoskeletal:  Negative for myalgias.        Phantom  limb pain left foot, LEFT BKA        Objective:     Vital Signs (Most Recent):  Temp: 98.2 °F (36.8 °C) (12/29/22 1608)  Pulse: 70 (12/29/22 1608)  Resp: 18 (12/29/22 1444)  BP: 126/79 (12/29/22 1608)  SpO2: 96 % (12/29/22 1608)   Vital Signs (24h Range):  Temp:  [97.5 °F (36.4 °C)-98.4 °F (36.9 °C)] 98.2 °F (36.8 °C)  Pulse:  [57-76] 70  Resp:  [16-20] 18  SpO2:  [92 %-98 %] 96 %  BP: (122-157)/(72-79) 126/79     Physical Examination:  Constitutional:       General: He is not in acute distress.     Appearance: Normal appearance. He is not ill-appearing.   HENT:      Head: Normocephalic and atraumatic.      Nose: Nose normal.   Eyes:      Extraocular Movements: Extraocular movements intact.      Conjunctiva/sclera: Conjunctivae normal.      Pupils: Pupils are equal, round, and reactive to light.   Cardiovascular:      Rate and Rhythm: Normal rate and regular rhythm.   Pulmonary:      Effort: Pulmonary effort is normal. No respiratory distress.      Breath sounds: CTAB     Comments: Breath sounds distant, no wheezing  Abdominal:      General: Abdomen is flat.      Palpations: Abdomen is soft.   Musculoskeletal:         General:  Left BKA, with brace and non soaked bandages present. Normal range of motion.   Neurological:      Mental Status: He is alert.      Sensory: Sensory deficit present.   Psychiatric:         Mood and Affect: Mood normal.         Behavior: Behavior normal.      Laboratory:  Most Recent Data:  CBC:   Recent Labs   Lab 12/28/22  0429 12/29/22  0404   WBC 6.6 6.7   HGB 13.9* 14.2   HCT 42.0 42.2    196     CMP:   Recent Labs   Lab 12/29/22  0404   CALCIUM 9.8   ALBUMIN 2.8*      K 4.6   CO2 29   BUN 29.7*   CREATININE 0.82   ALKPHOS 85   ALT 23   AST 28   BILITOT 0.7       Radiology:  Imaging Results              CT Foot W W/O Contrast Bilateral (Final result)  Result time 12/17/22 10:04:45      Final result by Ayse Martinez MD (12/17/22 10:04:45)                   Impression:       1. No definite acute fracture or acute erosive bone changes.  2. Chronic appearing changes at the left 2nd MTP joint.  3. Bilateral plantar surface soft tissue ulcerations and blisters.  Otherwise no deep drainable fluid collection.  4. 3 mm radiopaque foreign body in the plantar surface underlying the right 1st metatarsal head.      Electronically signed by: Ayse Martinez  Date:    12/17/2022  Time:    10:04               Narrative:    EXAMINATION:  CT FOOT W W/O CONTRAST BILATERAL    CLINICAL HISTORY:  Foot pain, chronic, osseous injury suspected;Diabetic Foot, r/o osteo;    TECHNIQUE:  CT images of the bilateral feet with and without contrast.  Axial, coronal, and sagittal reformatted images were obtained. Dose length product is 143 mGycm. Automatic exposure control, adjustment of mA/kV or iterative reconstruction technique was used to limit radiation dose.    COMPARISON:  X-rays dated 12/16/2022    FINDINGS:  Right foot:    There is no acute fracture identified.  There are no definite acute erosive bone changes.    There is a soft tissue ulceration along the plantar surface underlying the 3rd metatarsal head, with blister at the skin surface measuring 5 x 23 mm in size (series 14, image 32).  There is otherwise no drainable fluid collection.  There is a 3 mm radiopaque foreign body in the plantar surface underlying the 1st metatarsal head.    Left foot:    There is no definite acute fracture identified.  There is dorsal subluxation at the 2nd and 3rd MTP joints.  There are chronic appearing changes at the 2nd MTP joint.  There are no definite acute erosive changes identified.    There is plantar soft tissue ulceration with blister at the skin surface underlying the 2nd and 3rd metatarsal heads.  There is otherwise no drainable fluid collection identified.  There is no radiopaque foreign body.                                       X-Ray Foot Complete Right (Final result)  Result time 12/17/22 10:43:42       Final result by Yoly Mendez MD (12/17/22 10:43:42)                   Impression:      No change since prior      Electronically signed by: Yoly Mendez  Date:    12/17/2022  Time:    10:43               Narrative:    EXAMINATION:  XR FOOT COMPLETE 3 VIEW RIGHT    CLINICAL HISTORY:  . Diabetes mellitus due to underlying condition with foot ulcer    TECHNIQUE:  AP, lateral, and oblique views of the right foot were performed.    COMPARISON:  11/11/2022    FINDINGS:  Patient is status post amputation of the distal 2nd metatarsal.  There is a radiopaque foreign body seen at the base of the 1st toe.  It is unchanged since prior examination.  No fracture seen.  No dislocation is seen.                                       X-Ray Foot Complete Left (Final result)  Result time 12/17/22 10:36:22      Final result by Audi Giles MD (12/17/22 10:36:22)                   Impression:      Degenerative changes.    Dislocations slight subluxation of the 2nd and 3rd metatarsophalangeal joints.    Soft tissue ulceration.    No clear evidence of osteomyelitis.      Electronically signed by: Audi Giles  Date:    12/17/2022  Time:    10:36               Narrative:    EXAMINATION:  XR FOOT COMPLETE 3 VIEW LEFT    CLINICAL HISTORY:  Diabetes mellitus due to underlying condition with foot ulcer    COMPARISON:  None.    FINDINGS:  No acute displaced fractures or dislocations.    There are some degenerative changes of the proximal and distal interphalangeal joints with a slight hilus valgus deformity    There are persistent dislocations/subluxations involving the 2nd and 3rd metatarsophalangeal joints which appears to be unchanged as compared with the previous exam    There might be soft tissue disruption indicating the presence of an ulceration, however, on the provided images there is no evidence of primary or secondary signs to suggest the presence of osteomyelitis other imaging modalities might prove helpful  for further assessment                                       X-Ray Chest PA And Lateral (Final result)  Result time 12/17/22 10:28:04      Final result by Audi Giles MD (12/17/22 10:28:04)                   Impression:      No acute chest disease is identified.      Electronically signed by: Audi Giles  Date:    12/17/2022  Time:    10:28               Narrative:    EXAMINATION:  XR CHEST PA AND LATERAL    CLINICAL HISTORY:  , Cough, unspecified.    COMPARISON:  October 26, 2021    FINDINGS:  No alveolar consolidation, effusion, or pneumothorax is seen.   The thoracic aorta is normal  cardiac silhouette, central pulmonary vessels and mediastinum are normal in size and are grossly unremarkable.   visualized osseous structures are grossly unremarkable.                                      MRI Foot (Forefoot) Left W W/O Contrast 12/19/2022    Narrative  EXAMINATION:  MRI FOOT (FOREFOOT) LEFT W W/O CONTRAST    CLINICAL HISTORY:  Foot swelling, diabetic, osteomyelitis suspected, xray done;    TECHNIQUE:  Enhanced and unenhanced, multiplanar, multisequence MR imaging of the left foot was obtained.    COMPARISON:  MRI contralateral foot used for comparison dated 12/19/2022.  CT scan used for comparison dated 12/17/2022    FINDINGS:  Incongruent hallux valgus deformity.  Trabecular edema present to the distal phalanx of the great toe is equivocal.  Trabecular edema and an effusion present to the 1st metatarsophalangeal joint may represent early osteomyelitis.  Destructive osteomyelitis and septic arthritis are present to the 2nd and 3rd metatarsophalangeal joints with dorsal dislocation of the bases of the proximal phalanges and full-thickness tears of the plantar plates.  The flexor tendon of the 2nd toe is largely torn.  The 3rd flexor tendon is medially dislocated but appears structurally intact.  An abscess is present about the 3rd metatarsal head and measures 3.5 cm x 2 cm in size.  It likely represents  decompressed septic arthritis.    Myositis is present to the anterior intrinsic muscles.  An ulcer is present to the plantar aspect of the forefoot with necrotic tissue present in the ulcer base.  This measures approximately 4.5 cm x 3 cm and is nicely demonstrated on image 19 of series 9.  Suppurative flexor tenosynovitis is present to the midfoot.    Impression  Destructive septic arthritis/osteomyelitis is present to the 2nd and 3rd tarsal metatarsal joints with dorsal dislocation of the bases of the proximal phalanges and full-thickness tearing of the plantar plates.  The 2nd flexor tendon is largely torn.  The 3rd flexor tendon is dislocated medially.  A 3.5 cm x 2 cm abscess is present about the 3rd metatarsal head and likely represents decompressed septic arthritis.    Septic arthritis is likely present to the 1st metatarsal phalangeal joint.  Edema and trace enhancement seen to the distal phalanx of the great toe is equivocal..    Suppurative flexor tenosynovitis.  An ulcer is present to the plantar aspect of the forefoot with 4.5 cm of necrotic tissue present in the ulcer base.  This is nicely demonstrated on image 19 of series 9.    Myositis to the anterior intrinsic muscles.  Cellulitis is present to the forefoot.      Electronically signed by: Kenneth Madsen  Date:    12/19/2022  Time:    17:16    Current Medications:     Infusions:         Scheduled:   aspirin  81 mg Oral Daily    atorvastatin  40 mg Oral Daily    ceFAZolin  2 g Intravenous Once    collagenase   Topical (Top) Daily    divalproex  500 mg Oral Daily    docusate sodium  100 mg Oral Daily    DULoxetine  30 mg Oral BID    enoxaparin  40 mg Subcutaneous Daily    fluticasone furoate-vilanteroL  1 puff Inhalation Daily    gabapentin  600 mg Oral TID    insulin detemir U-100  25 Units Subcutaneous QHS    insulin detemir U-100  35 Units Subcutaneous Daily    miconazole   Topical (Top) BID        PRN:  albuterol-ipratropium, dextrose 10%, dextrose  10%, glucagon (human recombinant), glucose, glucose, HYDROcodone-acetaminophen, insulin aspart U-100, morphine, naloxone, sodium chloride 0.9%, sodium chloride 0.9%    Antibiotics and Day Number of Therapy:  None    No intake or output data in the 24 hours ending 12/29/22 1813      Lines/Drains/Airways       Peripheral Intravenous Line  Duration                  Peripheral IV - Single Lumen 12/23/22 2124 20 G Anterior;Right Forearm 5 days                  Assessment & Plan:   Diabetic Ulcers, Bilateral  Hx of Osteomyelitis of the Feet, Bilateral  Osteomyelitis and Septic arthritis, Left Foot S/P left BKA post-op day 1  Uncontrolled DMII  - HbA1c 11.3% at this time  - Has not taken insulin in over 1 year  - Worsening ulcers, left worse than right on plantar surfaces   - Strict glucose monitoring  - Continue insulin regimen: taking 35 units AM, 25 units nightly.  this AM.   - Antibiotics stopped.  - CT foot without obvious osteomyelitis bilaterally  - MRI foot bilateral: Osteomyelitis, septic arthritis in left foot  - Surgery on board; BKA done 12/21/2022  - PRN Pain medications in place.  - Gabapentin 600mg on board for diabetic neuropathy, Duloxetine on board  - Wound care consulted  - Continue PT and OT       Mild IMAN likely from glucosuric losses - resolved  - Renal indices improved      Chronic airway disease, likely COPD no PFTs at this time  Hx of Tobacco Use   - DuoNebs scheduled q6h PRN  - Improved        CODE STATUS: Full Code  Access: Peripheral  Antibiotics: None  Diet: Diabetic  DVT Prophylaxis: Lovenox  GI Prophylaxis: none needed  Fluids: None      Disposition: Day 12 of admission, s/p left BKA. Patient was accepted to [facility], but awaiting bed. Case Management working on placement at other facilities. Will follow up tomorrow.     Maribeth Gaines MD  [employer] Internal Medicine PGY-1

## 2022-12-30 NOTE — NURSING
[facility] discharge to [facility]. Pt agrees and understands discharge.

## 2022-12-31 NOTE — PT/OT/SLP DISCHARGE
Documenting PT did not evaluate / treat patient but evaluating PT not available to complete discharge summary    POST DISCHARGE DOCUMENTATION - [id number]    Physical Therapy Discharge Summary    Name: Con Arnold  MRN: [id number]   Principal Problem: Type 2 diabetes mellitus with left diabetic foot ulcer     Patient Discharged from acute Physical Therapy on 2022.  Please refer to prior PT noted date on 2022 for functional status.     Assessment:     Patient was discharged unexpectedly.  Information required to complete an accurate discharge summary is unknown.  Refer to therapy initial evaluation and last progress note for initial and most recent functional status and goal achievement.  Recommendations made may be found in medical record. Goals partially met.    Objective:     GOALS: 3 OUT OF 5 STG's met by patient    Multidisciplinary Problems       Physical Therapy Goals          Problem: Physical Therapy    Goal Priority Disciplines Outcome Goal Variances Interventions   Physical Therapy Goal     PT, PT/OT Ongoing, Progressing     Description: Goals to be met by: discharge     Patient will increase functional independence with mobility by performin. Supine to sit with Modified [place]- Met 2022  2. Sit to supine with Modified [place]- Met 2022  3. Sit to stand transfer with Stand-by Assistance- Met 2022  4. Gait  x 50 feet with Minimal Assistance using Rolling Walker. - NOT MET  5. Wheelchair propulsion x130 feet with Supervision using bilateral uppper extremities- NOT MET                       Reasons for Discontinuation of Therapy Services  HOSPITAL DISCHARGE       Plan:     Patient Discharged to: Inpatient Rehab.    PT RECOMMENDATIONS -  PER 2022 NOTE  Discharge Recommendations: rehabilitation facility  Discharge Equipment Recommendations: walker, rolling, wheelchair, bedside commode, bath bench   Barriers to discharge: Inaccessible home and severity  of deficits    12/31/2022

## 2023-01-03 NOTE — PT/OT/SLP DISCHARGE
Occupational Therapy Discharge Summary    Con Arnold  MRN: [id number]   Principal Problem: Type 2 diabetes mellitus with left diabetic foot ulcer    Patient Active Problem List   Diagnosis    Type 2 diabetes mellitus with left diabetic foot ulcer    S/P BKA (below knee amputation) unilateral, left    Uncontrolled type 2 diabetes mellitus with hyperglycemia    PAD (peripheral artery disease)    Diabetic ulcer of foot associated with diabetes mellitus due to underlying condition, limited to breakdown of skin         Patient Discharged from acute Occupational Therapy.  Please refer to prior OT note for functional status.    Assessment:      Patient has not met goals.    Objective:     GOALS:   Multidisciplinary Problems       Occupational Therapy Goals          Problem: Occupational Therapy    Goal Priority Disciplines Outcome Interventions   Occupational Therapy Goal     OT, PT/OT Ongoing, Progressing    Description: Goals to be met by: d/c     Patient will increase functional independence with ADLs by performing:    LE Dressing with Modified [place].  Sitting at edge of bed x 30 minutes for meals with [place] and no c/o fatigue or LOB.  Rolling to Bilateral with Modified [place].   Supine to sit with Modified [place].  Toilet transfer to drop arm bedside commode with Modified [place].                         Reasons for Discontinuation of Therapy Services  Transfer to alternate level of care.      Plan:     Patient Discharged to:  Rehab facility    1/3/2023

## 2023-01-10 ENCOUNTER — OFFICE VISIT (OUTPATIENT)
Dept: SURGERY | Facility: CLINIC | Age: 63
End: 2023-01-10
Payer: MEDICARE

## 2023-01-10 VITALS
HEIGHT: 72 IN | SYSTOLIC BLOOD PRESSURE: 115 MMHG | WEIGHT: 214 LBS | OXYGEN SATURATION: 96 % | HEART RATE: 64 BPM | BODY MASS INDEX: 28.99 KG/M2 | TEMPERATURE: 98 F | DIASTOLIC BLOOD PRESSURE: 68 MMHG

## 2023-01-10 DIAGNOSIS — Z89.512 HISTORY OF BELOW-KNEE AMPUTATION OF LEFT LOWER EXTREMITY: Primary | ICD-10-CM

## 2023-01-10 PROCEDURE — 99214 OFFICE O/P EST MOD 30 MIN: CPT | Mod: PBBFAC

## 2023-01-10 NOTE — PROGRESS NOTES
Patient requested that we send his prosthetic orders to [employer] in [place], [place] since they saw him while he was inpatient.  [employer] was contacted after receiving business from patient. Phone [phone].  Called Nery, ,  and she already had patient's information.  According to her the only other documentation that was needed was medical record stating that patient is healed and ready to move forward with prosthetic.  The above was printed from chart and faxed to [phone].  Fax confirmation received.

## 2023-01-10 NOTE — PROGRESS NOTES
[facility] General Surgery Clinic Note    HPI: Con Arnold is a 62 y.o. presents to clinic for wound care due to recent history of BKA on 12/21/2022 indicated by osteomyelitis 2/2 non-healing diabetic foot ulcer. Since surgery, patient denies any pain or symptoms of fever, nausea, vomiting. Patient endorsees phantom pain that is controlled by the pain medication and gabapentin. Patient reports no discharge or bleeding from the wound and says he would like to get the staples and sutures out so he can get fitted for a prosthetic.     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     Cataract     Diabetes mellitus, type 2     Glaucoma     Hyperlipidemia     Neuropathy     Osteomyelitis       Meds:   Current Outpatient Medications:     aspirin (ECOTRIN) 81 MG EC tablet, Take 1 tablet (81 mg total) by mouth once daily., Disp: 30 tablet, Rfl: 6    atorvastatin (LIPITOR) 40 MG tablet, Take 1 tablet (40 mg total) by mouth once daily., Disp: 90 tablet, Rfl: 3    blood sugar diagnostic Strp, Accu-Chek Rianna Plus test strips, Disp: , Rfl:     DEPAKOTE  mg Tb24, Take 1 tablet (500 mg total) by mouth once daily., Disp: 30 tablet, Rfl: 6    dicyclomine (BENTYL) 20 mg tablet, dicyclomine 20 mg tablet  Take 1 tablet 4 times a day by oral route as needed., Disp: , Rfl:     DULoxetine (CYMBALTA) 30 MG capsule, Take 1 capsule (30 mg total) by mouth 2 (two) times daily., Disp: 60 capsule, Rfl: 11    fluticasone-salmeterol diskus inhaler 250-50 mcg, fluticasone 250 mcg-salmeterol 50 mcg/dose blistr powdr for inhalation, Disp: , Rfl:     gabapentin (NEURONTIN) 300 MG capsule, Take 300 mg by mouth 3 (three) times daily., Disp: , Rfl:     insulin detemir U-100 (LEVEMIR) 100 unit/mL injection, Inject twice a day: At Night 25 units, in the Morning 35 units. Monitor blood sugars at least 2 times a day., Disp: 22.5 mL, Rfl: 3    lisinopriL 10 MG tablet, Take 1 tablet (10 mg total) by mouth once  "daily., Disp: 30 tablet, Rfl: 6    pen needle, diabetic 31 gauge x 5/16" Ndle, BD Ultra-Fine Short Pen Needle 31 gauge x 5/16", Disp: , Rfl:     albuterol (PROVENTIL/VENTOLIN HFA) 90 mcg/actuation inhaler, Ventolin HFA 90 mcg/actuation aerosol inhaler, Disp: , Rfl:   Allergies:   Review of patient's allergies indicates:   Allergen Reactions    Sulfa (sulfonamide antibiotics)      Social History:   Social History     Tobacco Use    Smoking status: Some Days     Types: Cigarettes    Smokeless tobacco: Never   Substance Use Topics    Alcohol use: Never    Drug use: Never     Family History:   Family History   Problem Relation Age of Onset    COPD Mother     Macular degeneration Mother     Hypertension Father     Cancer Father      Surgical History:   Past Surgical History:   Procedure Laterality Date    APPENDECTOMY      BELOW KNEE AMPUTATION OF LOWER EXTREMITY Left 12/21/2022    Procedure: AMPUTATION, BELOW KNEE;  Surgeon: William Aguayo MD;  Location: [facility];  Service: General;  Laterality: Left;    CARPAL TUNNEL RELEASE Bilateral     FEMORAL ARTERY STENT Left     FOOT SURGERY       Review of Systems:  10 point review of systems denied unless otherwise indicated above HPI    Objective:    Vitals:  Vitals:    01/10/23 1303   BP: 115/68   Pulse: 64   Temp: 97.6 °F (36.4 °C)          Physical Exam:  Gen: NAD  Neuro: awake, alert, answering questions appropriately  CV: RRR  Resp: non-labored breathing  Abd: soft, ND, NT  Ext: moves all 4 spontaneously, no pain on palpation of stump, no discharge or bleeding seen, bandage is dry and clean without any discoloration  Skin: warm, well perfused      Assessment/Plan:  Con BRADFORD Arnold is a 62 y.o. presents to clinic for wound care due to recent history of BKA on 12/21/2022 indicated by osteomyelitis 2/2 non-healing diabetic foot ulcer.     - Continue wound care visits  - staples and sutures removed   - start consult for prosthetic fitting     Bobby Giang MS3 [employer] "       RESIDENT ATTESTATION    I have seen and  evaluated the patient with the student doctor. Agree with assessment and plan with addendum as follows:     Assessment:  Con Arnold is a 62 y.o. male with PMHx of DM2, osteomyelitis, and nonhealing wound of the L foot who is now 3 weeks s/p L BKA. Recently discharged from [facility]. Incision site healing well. Sutures and staples removed in clinic, replaced with steri strips. Working with PT/OT. Will have him follow up with his prosthetic company.     Plan:   - Patient sutures/staples removed  - Amputation site healing well and is appropriate for prosthetic planning   - RTC 2 weeks for follow up     Stephen Zavala MD  [facility] General Surgery PGY-1  3:11 PM

## 2023-02-14 ENCOUNTER — OFFICE VISIT (OUTPATIENT)
Dept: SURGERY | Facility: CLINIC | Age: 63
End: 2023-02-14
Payer: MEDICARE

## 2023-02-14 VITALS
DIASTOLIC BLOOD PRESSURE: 77 MMHG | HEIGHT: 72 IN | OXYGEN SATURATION: 95 % | SYSTOLIC BLOOD PRESSURE: 132 MMHG | HEART RATE: 64 BPM | RESPIRATION RATE: 18 BRPM | BODY MASS INDEX: 28.99 KG/M2 | TEMPERATURE: 98 F | WEIGHT: 214 LBS

## 2023-02-14 DIAGNOSIS — Z89.512 HISTORY OF BELOW-KNEE AMPUTATION OF LEFT LOWER EXTREMITY: Primary | ICD-10-CM

## 2023-02-14 PROCEDURE — 99215 OFFICE O/P EST HI 40 MIN: CPT | Mod: PBBFAC

## 2023-02-14 NOTE — PROGRESS NOTES
"[facility] GENERAL SURGERY   Clinic Note     HPI:   Con Arnold is a 62 y.o. with PMHx of DM2, osteomyelitis, and nonhealing wound of the L foot who is now 5 weeks s/p L BKA. Incision site has no pain, he has not felt any phantom pains in a while. Working with prosthetics company and is now on size #3 stump . Mobile at home with use of walker and wheelchair. Of note, patient states that wound care will not see him for his R foot ulcer as they require "release from surgery" for further management. His established with Dr. Ortega of Podiatry which is much closer to his home. Denies any new symptoms.     Objective:  Vitals:  Vitals:    02/14/23 1303   BP: 132/77   Pulse: 64   Resp: 18   Temp: 98.1 °F (36.7 °C)      Physical Exam:  Gen: NAD  Neuro: awake, alert, answering questions appropriately  CV: RRR  Resp: non-labored breathing  Abd: soft, ND, NT  Ext: L BKA site well healed // R foot 2+ DP/PT, 2nd ray amp site well healed plantar R foot with thick calus, no tenderness/erythema/drainage  Skin: warm, well perfused    Assessment/Plan:  Con Arnold is a 62 y.o. with PMHx of DM, osteomyelitis s/p R 2nd ray amp and 5 weeks s/p L BKA here for follow-up. Stump well healed, now on size 3 stump , following closely with prosthetics company. Patient also with thick plantar calus requesting "release from surgery" for wound care to continue managing. Patient can follow-up with wound care here versus podiatry with whom he is already established. Otherwise, return to General Surgery clinic PRN.     Stephen Zavala MD  [facility] General Surgery PGY-1  02/14/2023 1:25 PM     "

## 2023-03-02 ENCOUNTER — HOSPITAL ENCOUNTER (OUTPATIENT)
Dept: WOUND CARE | Facility: HOSPITAL | Age: 63
Discharge: HOME | End: 2023-03-02
Attending: EMERGENCY MEDICINE
Payer: MEDICARE

## 2023-03-02 VITALS
WEIGHT: 214 LBS | BODY MASS INDEX: 28.99 KG/M2 | HEIGHT: 72 IN | RESPIRATION RATE: 18 BRPM | TEMPERATURE: 98 F | HEART RATE: 82 BPM | DIASTOLIC BLOOD PRESSURE: 47 MMHG | SYSTOLIC BLOOD PRESSURE: 112 MMHG

## 2023-03-02 DIAGNOSIS — L84 CORNS AND CALLOSITIES: ICD-10-CM

## 2023-03-02 DIAGNOSIS — B20 HUMAN IMMUNODEFICIENCY VIRUS: ICD-10-CM

## 2023-03-02 DIAGNOSIS — E11.622 TYPE 2 DIABETES MELLITUS WITH OTHER SKIN ULCER, WITHOUT LONG-TERM CURRENT USE OF INSULIN: ICD-10-CM

## 2023-03-02 DIAGNOSIS — L97.511 ULCER OF RIGHT FOOT, LIMITED TO BREAKDOWN OF SKIN: ICD-10-CM

## 2023-03-02 DIAGNOSIS — F17.210 CIGARETTE NICOTINE DEPENDENCE WITHOUT COMPLICATION: ICD-10-CM

## 2023-03-02 DIAGNOSIS — M86.671 CHRONIC OSTEOMYELITIS OF RIGHT FOOT: ICD-10-CM

## 2023-03-02 DIAGNOSIS — Z89.512 ACQUIRED ABSENCE OF LEFT LEG BELOW KNEE: ICD-10-CM

## 2023-03-02 LAB — POCT GLUCOSE: 193 MG/DL (ref 70–110)

## 2023-03-02 PROCEDURE — 99204 PR OFFICE/OUTPT VISIT, NEW, LEVL IV, 45-59 MIN: ICD-10-PCS | Mod: 25,,, | Performed by: EMERGENCY MEDICINE

## 2023-03-02 PROCEDURE — 97597 DBRDMT OPN WND 1ST 20 CM/<: CPT

## 2023-03-02 PROCEDURE — 99204 OFFICE O/P NEW MOD 45 MIN: CPT | Mod: 25,,, | Performed by: EMERGENCY MEDICINE

## 2023-03-02 PROCEDURE — 97597 DEBRIDEMENT: ICD-10-PCS | Mod: ,,, | Performed by: EMERGENCY MEDICINE

## 2023-03-02 PROCEDURE — 97597 DBRDMT OPN WND 1ST 20 CM/<: CPT | Mod: ,,, | Performed by: EMERGENCY MEDICINE

## 2023-03-02 PROCEDURE — 27000999 HC MEDICAL RECORD PHOTO DOCUMENTATION

## 2023-03-02 RX ORDER — GLUCOSAM/CHON-MSM1/C/MANG/BOSW 500-416.6
TABLET ORAL
COMMUNITY
Start: 2023-01-19

## 2023-03-02 RX ORDER — FOLIC ACID 1 MG/1
TABLET ORAL
COMMUNITY
Start: 2022-07-11

## 2023-03-02 RX ORDER — INSULIN GLARGINE 100 [IU]/ML
INJECTION, SOLUTION SUBCUTANEOUS
COMMUNITY
Start: 2023-02-01

## 2023-03-02 RX ORDER — GLIMEPIRIDE 2 MG/1
TABLET ORAL
COMMUNITY
Start: 2022-07-11

## 2023-03-02 RX ORDER — SYRING-NEEDL,DISP,INSUL,0.3 ML 31 GX5/16"
SYRINGE, EMPTY DISPOSABLE MISCELLANEOUS
COMMUNITY
Start: 2023-01-11

## 2023-03-02 RX ORDER — BLOOD-GLUCOSE METER
EACH MISCELLANEOUS
COMMUNITY
Start: 2023-01-19

## 2023-03-02 NOTE — PATIENT INSTRUCTIONS
Pt seen today by: Shannon Verdin MD    Home health and self care DRESSING INSTRUCTIONS:        Wound location: Right planter foot    Cleanse wound with soap and water  Apply remedy to the dry skin          Return visit: Call if you need to return for any open wounds      Wound may have been debrided in clinic: if so, WHAT YOU NEED TO KNOW:    Debridement is the removal of infected, damaged, or dead tissue so a wound can heal properly. Your wound may need more than one debridement. Debridement can cause bleeding, and a small amount of blood is expected.  AFTER A DEBRIDEMENT:    Keep your wound clean and dry. Do not remove the dressing unless instructed.  Follow the wound care orders provided to you or your home health care provider.  If you have pain, take over the counter pain relievers or pain medication if prescribed.  Elevate the wound and limit excessive activity to prevent bleeding and/or swelling in your wound.  If you see blood coming through the dressing, apply gauze and tape over the dressing and hold firm pressure to the wound with your hand for 5-10 minutes continuously, without peeking, to help the bleeding stop.  Contact [facility] wound care team at [phone] or go to the nearest Emergency department if:    You have a fever greater than 101 taken by mouth.  Your pain gets worse or does not go away, even after taking your regular pain medicine.  Your skin around your wound is red, hot, swollen, or draining pus.  You have bleeding that continues to come through the dressing after holding pressure for 10 minutes       Nutrition:  The current daily value (%DV) for protein is 50 grams per day and is meant as a general goal for most people. Further increasing your dietary protein intake is very important for wound healing. Typically one needs over 100g of protein per day to help with wound healing needs.  If you are a dialysis patient or have problems with your kidneys, talk to your Nephrologist about how  much protein you can take in with your condition.  Examples of high protein items that can be added to your diet include: eggs, chicken, red meats, almonds, cottage cheese, Greek yogurt, beans, and peanut butter.  Fortified protein bars, shakes and drinks can add 15-30 additional grams of protein per serving.   Also add:   1 daily general multivitamin   Donaldo : 1 packet twice daily   Vitamin C : 500mg twice daily   Zinc 220 mg daily  Vit D : once daily    Offloading   Offload your wound. This means to reduce pressure on and around the wound that reduces blood flow to the wound and prevents healing. Your wound care team will discuss specific ways for you to offload your specific wound. Common offloading strategies include:    Turn or reposition every 2 hours or sooner  Use pillows, wedges, ROHO wheelchair cushions or other special devices like boots and shoes to lift the wound off of hard surfaces  Alternating Low Air-loss (ALAL) mattress may be ordered  Padded dressings can reduce wound pressure        Call our [facility] wound clinic for questions/concerns a [phone] .

## 2023-03-02 NOTE — PROGRESS NOTES
Subjective:       Patient ID: Con Arnold is a 62 y.o. male.    Chief Complaint: No chief complaint on file.    NEW PATIENT      62-year-old white male with HIV , diabetes and bilateral foot DFU's referred to this Wound Care Clinic from [facility] general surgery clinic I believe to help treat right plantar foot DFU/callous.  Pt reports having bilateral plantar DFU's with osteomyelitis for a 'long time'.  He says that last summer of 2022 he had a right 2nd toe amputation in [place] and then spent about a month at a long-term acute care facility in [place] under the care of his primary provider Dr Leon.  He also had the left plantar diabetic foot ulcer as well.  But it sounds like over the next several months in the fall of 2022 he spent a lot of time in [place] and reports having a right 2nd metatarsal bone resection in September while in [place] infra time got lost to follow-up and was not taking any oral antibiotics.    Records indicate he showed up to the ER at [facility] on 11/11/2022 requesting sutures be removed from his right foot.  He says he is working with a company called [employer]  to get his prosthesis.  He has an upcoming appointment with his usual podiatrist Dr. Ortega but was referred to this Wound Care Clinic I believe to have the right plantar callus debride it.  Patient states that it was supposed to be for the [facility] wound care but he did not want to go back there.  He is wearing a regular tennis shoe stating that he can not use a Darco or PEG assist because this is now his weight-bearing leg at this time until he gets his prosthesis and he would be too unstable for anything else including a walking boot    Past Medical History:  No date: Cataract  No date: Diabetes mellitus, type 2  No date: Glaucoma  No date: Hyperlipidemia  No date: Neuropathy  No date: Osteomyelitis  Past Surgical History:  No date: APPENDECTOMY  12/21/2022: BELOW KNEE AMPUTATION OF LOWER EXTREMITY; Left      Comment:  Procedure:  AMPUTATION, BELOW KNEE;  Surgeon: William Aguayo MD;  Location: [facility];  Service: General;                 Laterality: Left;  No date: CARPAL TUNNEL RELEASE; Bilateral  No date: CATARACT EXTRACTION; Left  No date: FEMORAL ARTERY STENT; Left  No date: FOOT SURGERY  No date: removal of rt 2nd toe; Right        Review of Systems   Constitutional:  Positive for activity change. Negative for chills, fatigue and fever.   HENT: Negative.     Respiratory: Negative.     Cardiovascular: Negative.    Gastrointestinal: Negative.    Musculoskeletal:         Recent left BKA   Skin:  Positive for wound.   Neurological: Negative.        Objective:      Vitals:    03/02/23 1305   BP: (!) 112/47   Pulse: 82   Resp: 18   Temp: 97.9 °F (36.6 °C)     @poctglucose@  Recent Labs   Lab 03/02/23  1314   POCTGLUCOSE 193*     Physical Exam  Constitutional:       General: He is not in acute distress.     Appearance: He is not toxic-appearing.   HENT:      Head: Normocephalic and atraumatic.   Eyes:      Conjunctiva/sclera: Conjunctivae normal.   Cardiovascular:      Pulses: Normal pulses.   Pulmonary:      Effort: Pulmonary effort is normal.   Abdominal:      General: Abdomen is flat.   Musculoskeletal:      Right lower leg: No edema.      Left lower leg: No edema.        Feet:       Left Lower Extremity: Left leg is amputated below knee.   Feet:      Comments: Prior 2nd toe amputation; prominent subluxation of 3/4th MTH; planter foot with early charcot changes/widenening  Neurological:      General: No focal deficit present.      Mental Status: He is alert.            (Retired) Wound 05/23/22 1345 Diabetic Ulcer Right plantar Foot #2 (Active)   05/23/22 1345    Pre-existing: Yes   Primary Wound Type: Diabetic ulc   Side: Right   Orientation: plantar   Location: Foot   Wound Number: #2   Ankle-Brachial Index: ABIs done on 3/2/23 R dp 1.09 pt 0.89   Pulses: Doopler biphasic x2, palpable x2/ Left BKA   Removal Indication and  Assessment:    Wound Outcome:    (Retired) Wound Type:    (Retired) Wound Length (cm):    (Retired) Wound Width (cm):    (Retired) Depth (cm):    Wound Description (Comments):    Removal Indications:    Wound Image   03/02/23 1333   Dressing Appearance Open to air 03/02/23 1333   Drainage Amount None 03/02/23 1333   Appearance Epithelialization 03/02/23 1333   Black (%), Wound Tissue Color 0 % 03/02/23 1333   Red (%), Wound Tissue Color 0 % 03/02/23 1333   Yellow (%), Wound Tissue Color 0 % 03/02/23 1333   Wound Edges Callused 03/02/23 1333   Wound Length (cm) 2.2 cm 03/02/23 1333   Wound Width (cm) 2 cm 03/02/23 1333   Wound Depth (cm) 0 cm 03/02/23 1333   Wound Volume (cm^3) 0 cm^3 03/02/23 1333   Wound Surface Area (cm^2) 4.4 cm^2 03/02/23 1333   Care Cleansed with:;Soap and water;Wound cleanser;Applied: 03/02/23 1333           Assessment:       1. Ulcer of right foot, limited to breakdown of skin    2. Corns and callosities    3. Type 2 diabetes mellitus with other skin ulcer, without long-term current use of insulin    4. Chronic osteomyelitis of right foot    5. Human immunodeficiency virus    6. Cigarette nicotine dependence without complication    7. 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            Right plantar callous subjacent to 3rd MTH  Right 2nd toe amputation summer 2022 secondary to DFU with osteo/gangrene at [facility] with LTACH stay x 4 weeks in [place]; later 2nd MT removal in Fall 2022 in [place]  Diabetes uncontrolled with last A1c December 2022 of 10.2 ; reports recent level 7 range Feb 2023  Left foot diabetic foot ulcers with osteomyelitis status post left BKA late December 2022 at [facility]  HIV status      Lab Results   Component Value Date    WBC 6.8 12/31/2022    HGB 14.2 12/31/2022    HCT 42.7 12/31/2022    MCV 98.4 (H) 12/31/2022     12/31/2022         CMP  Sodium Level   Date Value Ref Range Status   12/31/2022 137 136 - 145 mmol/L Final     Potassium Level   Date Value Ref Range  Status   12/31/2022 5.0 3.5 - 5.1 mmol/L Final     Carbon Dioxide   Date Value Ref Range Status   12/31/2022 29 23 - 31 mmol/L Final     Blood Urea Nitrogen   Date Value Ref Range Status   12/31/2022 30.0 (H) 8.4 - 25.7 mg/dL Final     Creatinine   Date Value Ref Range Status   12/31/2022 0.79 0.73 - 1.18 mg/dL Final     Calcium Level Total   Date Value Ref Range Status   12/31/2022 9.0 8.8 - 10.0 mg/dL Final     Albumin Level   Date Value Ref Range Status   12/31/2022 2.9 (L) 3.4 - 4.8 g/dL Final     Bilirubin Total   Date Value Ref Range Status   12/31/2022 0.8 <=1.5 mg/dL Final     Alkaline Phosphatase   Date Value Ref Range Status   12/31/2022 81 40 - 150 unit/L Final     Aspartate Aminotransferase   Date Value Ref Range Status   12/31/2022 25 5 - 34 unit/L Final     Alanine Aminotransferase   Date Value Ref Range Status   12/31/2022 19 0 - 55 unit/L Final     eGFR   Date Value Ref Range Status   12/31/2022 >60 mls/min/1.73/m2 Final     Lab Results   Component Value Date    HGBA1C 10.2 (H) 12/31/2022     Lab Results   Component Value Date    SEDRATE 42 (H) 12/16/2022     Lab Results   Component Value Date    CRP 81.10 (H) 12/16/2022   MRI FOOT (FOREFOOT) RIGHT W W/O CONTRAST     CLINICAL HISTORY:  Foot swelling, diabetic, osteomyelitis suspected, xray done;     TECHNIQUE:  Enhanced and unenhanced, multiplanar, multisequence MR imaging of the right forefoot was obtained.     COMPARISON:  MRI contralateral foot used for comparison dated 12/19/2022.  CT scan used for comparison dated 12/17/2022.     FINDINGS:  The base of the 2nd metatarsal is well aligned with the middle cuneiform.  Trabecular edema is present on both sides of the 5th metatarsal phalangeal joint without visible effusion or erosion.  The 2nd toe has been amputated at the level of the mid metatarsal.     The collateral ligaments of the 1st, 3rd, 4th, and 5th toes are intact.  The residual plantar plates are intact.  No effusion or erosion to suggest  septic arthritis.  The Lisfranc ligament is intact.  The intermetatarsal ligaments are intact.     The tendons of the 1st, 3rd, 4th, and 5th toes appear structurally intact.  The 5th flexor tendon is likely dislocated medially.  Mild atrophy of the lateral intrinsic muscles.  No intermetatarsal bursitis.  Susceptibility artifact is present to the plantar subcutaneous tissue at the level of the 1st metatarsophalangeal joint and is favored to represent a retained foreign body.  Cellulitis is present to the forefoot without abscess.     Impression:     Cellulitis is present to the plantar aspect of the forefoot without abscess.  A tiny focus of susceptibility artifact is present to the plantar aspect of the 1st metatarsophalangeal joint and may represent a retained metallic foreign body.     The 2nd toe has been amputated at the level of the mid metatarsal.  No osteomyelitis demonstrated on today's study.  Subtle trabecular edema present on both sides of the 5th metatarsophalangeal joint is likely reactive.        Electronically signed by: Kenneth Madsen  Date:                                            12/19/2022  Time:                                           17:26    Plan:     Plan of Care:    I reviewed the records that I can see in [facility].  Patient basically is here to have his right callus shaved under his right plantar foot.  I am a little bit confused about the whole thing because he is seeing his podiatrist next week but in any event I was able to selectively debride away this callus and there was no open wound underneath.  We had a long discussion about the fact that this will be an ongoing issue because of the significant metatarsal head subluxation in this area where he is getting this hyperkeratotic formation and ultimately this area will breakdown again and again.  I suggested that he try to pad the arch of his foot to try to make that the 1st weight-bearing point when his foot touches the floor.  We  discussed different types of shoes but he says he is unable to use anything other than his current tennis shoe.  I asked him to talk to the company that is working to do his left prosthesis if they can perhaps do some type of molded insert to help offload his metatarsal head complex.  And of course he could discuss all this with his podiatrist next week.  Offloading:will be key as noted above  Nutrition: Must have a high protein diet to support wound  healing; (if renal disease, see nephrologist for amount allowed):  this should be over 100g protein /day (if no kidney issues); Also rec MVI along with vit C, vit D, zinc and Donaldo  Diabetes: must have a strict diabetic diet, take meds and encourage lower hba1c  Smoker: must stop smoking:  It is unfortunate that throughout all this process he is still smoking;  explained the negative impact this has own not only the wounds (delayed healing) but overall health as well  No need for follow-up appointment at this time         The time spent including preparing to see the patient, obtaining patient history and assessment, evaluation of the plan of care, patient/caregiver counseling and education, orders, documentation, coordination of care, and other professional medical management activities for today's encounter was 55 minutes.

## 2023-03-03 NOTE — PROCEDURES
"Debridement    Date/Time: 3/2/2023 1:00 PM  Performed by: Shannon Verdin MD  Authorized by: Shannon Verdin MD   Associated wounds:        (Retired) Wound 05/23/22 1345 Diabetic Ulcer Right plantar Foot #2  Time out: Immediately prior to procedure a "time out" was called to verify the correct patient, procedure, equipment, support staff and site/side marked as required.    Consent Done?:  Yes (Written)    Preparation: Patient was prepped and draped in usual sterile fashion    Local anesthesia used?: Yes    Local anesthetic:  Topical anesthetic    Wound Details:    Location:  Right foot    Location:  Right Plantar    Type of Debridement:  Excisional       Length (cm):  2.2       Area (sq cm):  4.4       Width (cm):  2       Percent Debrided (%):  100       Depth (cm):  0       Total Area Debrided (sq cm):  4.4    Depth of debridement:  Epidermis/Dermis    Tissue debrided:  Dermis and Epidermis    Devitalized tissue debrided:  Callus    Instruments:  Blade and Curette    Bleeding:  None  Patient tolerance:  Patient tolerated the procedure well with no immediate complications  "

## 2023-05-27 ENCOUNTER — HOSPITAL ENCOUNTER (EMERGENCY)
Facility: HOSPITAL | Age: 63
Discharge: HOME | End: 2023-05-27
Attending: FAMILY MEDICINE
Payer: MEDICARE

## 2023-05-27 VITALS
DIASTOLIC BLOOD PRESSURE: 79 MMHG | SYSTOLIC BLOOD PRESSURE: 144 MMHG | BODY MASS INDEX: 27.63 KG/M2 | OXYGEN SATURATION: 97 % | RESPIRATION RATE: 18 BRPM | WEIGHT: 204 LBS | HEART RATE: 50 BPM | HEIGHT: 72 IN | TEMPERATURE: 98 F

## 2023-05-27 DIAGNOSIS — K80.50 BILIARY COLIC: ICD-10-CM

## 2023-05-27 DIAGNOSIS — K80.20 CALCULUS OF GALLBLADDER WITHOUT CHOLECYSTITIS WITHOUT OBSTRUCTION: Primary | ICD-10-CM

## 2023-05-27 DIAGNOSIS — Z72.0 TOBACCO ABUSE: ICD-10-CM

## 2023-05-27 LAB
ALBUMIN SERPL-MCNC: 3.3 G/DL (ref 3.4–4.8)
ALBUMIN/GLOB SERPL: 1.1 RATIO (ref 1.1–2)
ALP SERPL-CCNC: 56 UNIT/L (ref 40–150)
ALT SERPL-CCNC: 15 UNIT/L (ref 0–55)
AST SERPL-CCNC: 27 UNIT/L (ref 5–34)
BASOPHILS # BLD AUTO: 0.03 X10(3)/MCL
BASOPHILS NFR BLD AUTO: 0.5 %
BILIRUBIN DIRECT+TOT PNL SERPL-MCNC: 0.7 MG/DL
BUN SERPL-MCNC: 19.9 MG/DL (ref 8.4–25.7)
CALCIUM SERPL-MCNC: 10 MG/DL (ref 8.8–10)
CHLORIDE SERPL-SCNC: 109 MMOL/L (ref 98–107)
CO2 SERPL-SCNC: 24 MMOL/L (ref 23–31)
CREAT SERPL-MCNC: 1.09 MG/DL (ref 0.73–1.18)
EOSINOPHIL # BLD AUTO: 0.2 X10(3)/MCL (ref 0–0.9)
EOSINOPHIL NFR BLD AUTO: 3.2 %
ERYTHROCYTE [DISTWIDTH] IN BLOOD BY AUTOMATED COUNT: 13.2 % (ref 11.5–17)
GFR SERPLBLD CREATININE-BSD FMLA CKD-EPI: >60 MLS/MIN/1.73/M2
GLOBULIN SER-MCNC: 2.9 GM/DL (ref 2.4–3.5)
GLUCOSE SERPL-MCNC: 109 MG/DL (ref 82–115)
HCT VFR BLD AUTO: 41.7 % (ref 42–52)
HGB BLD-MCNC: 14.6 G/DL (ref 14–18)
IMM GRANULOCYTES # BLD AUTO: 0.04 X10(3)/MCL (ref 0–0.04)
IMM GRANULOCYTES NFR BLD AUTO: 0.6 %
LIPASE SERPL-CCNC: 12 U/L
LYMPHOCYTES # BLD AUTO: 1.84 X10(3)/MCL (ref 0.6–4.6)
LYMPHOCYTES NFR BLD AUTO: 29.3 %
MAGNESIUM SERPL-MCNC: 1.4 MG/DL (ref 1.6–2.6)
MCH RBC QN AUTO: 33.6 PG (ref 27–31)
MCHC RBC AUTO-ENTMCNC: 35 G/DL (ref 33–36)
MCV RBC AUTO: 95.9 FL (ref 80–94)
MONOCYTES # BLD AUTO: 0.76 X10(3)/MCL (ref 0.1–1.3)
MONOCYTES NFR BLD AUTO: 12.1 %
NEUTROPHILS # BLD AUTO: 3.42 X10(3)/MCL (ref 2.1–9.2)
NEUTROPHILS NFR BLD AUTO: 54.3 %
NRBC BLD AUTO-RTO: 0 %
PLATELET # BLD AUTO: 118 X10(3)/MCL (ref 130–400)
PLATELETS.RETICULATED NFR BLD AUTO: 6 % (ref 0.9–11.2)
PMV BLD AUTO: 11.8 FL (ref 7.4–10.4)
POTASSIUM SERPL-SCNC: 3.6 MMOL/L (ref 3.5–5.1)
PROT SERPL-MCNC: 6.2 GM/DL (ref 5.8–7.6)
RBC # BLD AUTO: 4.35 X10(6)/MCL (ref 4.7–6.1)
SODIUM SERPL-SCNC: 142 MMOL/L (ref 136–145)
WBC # SPEC AUTO: 6.29 X10(3)/MCL (ref 4.5–11.5)

## 2023-05-27 PROCEDURE — 80053 COMPREHEN METABOLIC PANEL: CPT | Performed by: PHYSICIAN ASSISTANT

## 2023-05-27 PROCEDURE — 99285 EMERGENCY DEPT VISIT HI MDM: CPT | Mod: 25

## 2023-05-27 PROCEDURE — 96361 HYDRATE IV INFUSION ADD-ON: CPT

## 2023-05-27 PROCEDURE — 83690 ASSAY OF LIPASE: CPT | Performed by: PHYSICIAN ASSISTANT

## 2023-05-27 PROCEDURE — 96365 THER/PROPH/DIAG IV INF INIT: CPT | Mod: 59

## 2023-05-27 PROCEDURE — 85025 COMPLETE CBC W/AUTO DIFF WBC: CPT | Performed by: PHYSICIAN ASSISTANT

## 2023-05-27 PROCEDURE — 96375 TX/PRO/DX INJ NEW DRUG ADDON: CPT

## 2023-05-27 PROCEDURE — 96372 THER/PROPH/DIAG INJ SC/IM: CPT | Performed by: FAMILY MEDICINE

## 2023-05-27 PROCEDURE — 63600175 PHARM REV CODE 636 W HCPCS: Performed by: FAMILY MEDICINE

## 2023-05-27 PROCEDURE — 83735 ASSAY OF MAGNESIUM: CPT | Performed by: FAMILY MEDICINE

## 2023-05-27 PROCEDURE — 25500020 PHARM REV CODE 255: Performed by: FAMILY MEDICINE

## 2023-05-27 RX ORDER — MAGNESIUM SULFATE HEPTAHYDRATE 40 MG/ML
2 INJECTION, SOLUTION INTRAVENOUS
Status: COMPLETED | OUTPATIENT
Start: 2023-05-27 | End: 2023-05-27

## 2023-05-27 RX ORDER — ONDANSETRON 2 MG/ML
4 INJECTION INTRAMUSCULAR; INTRAVENOUS
Status: COMPLETED | OUTPATIENT
Start: 2023-05-27 | End: 2023-05-27

## 2023-05-27 RX ORDER — DICYCLOMINE HYDROCHLORIDE 10 MG/ML
20 INJECTION INTRAMUSCULAR
Status: COMPLETED | OUTPATIENT
Start: 2023-05-27 | End: 2023-05-27

## 2023-05-27 RX ADMIN — SODIUM CHLORIDE, POTASSIUM CHLORIDE, SODIUM LACTATE AND CALCIUM CHLORIDE 1000 ML: 600; 310; 30; 20 INJECTION, SOLUTION INTRAVENOUS at 02:05

## 2023-05-27 RX ADMIN — ONDANSETRON 4 MG: 2 INJECTION INTRAMUSCULAR; INTRAVENOUS at 02:05

## 2023-05-27 RX ADMIN — DICYCLOMINE HYDROCHLORIDE 20 MG: 20 INJECTION, SOLUTION INTRAMUSCULAR at 02:05

## 2023-05-27 RX ADMIN — MAGNESIUM SULFATE HEPTAHYDRATE 2 G: 40 INJECTION, SOLUTION INTRAVENOUS at 04:05

## 2023-05-27 RX ADMIN — IOHEXOL 100 ML: 350 INJECTION, SOLUTION INTRAVENOUS at 04:05

## 2023-05-27 NOTE — DISCHARGE INSTRUCTIONS
As discussed, you do have a gallstone that appears to be intermittently causing some temporary blockage of the gallbladder which leads to pain and other symptoms.      For the time being, you should follow a very low-fat, low oil, low grease diet.     I have referred you to the General Surgery clinic, they will call you to schedule a clinic appointment to talk about findings and options including possible gallbladder removal.      Return to the ER as needed.

## 2023-05-27 NOTE — ED PROVIDER NOTES
"Encounter Date: 5/27/2023       History     Chief Complaint   Patient presents with    Abdominal Pain     States currently in Rehab for Left BKA.  Is having RUQ pain, nausea and vomiting since 8pm.       Patient is a pleasant 62-year-old gentleman presents emergency room with complaints of abdominal cramping and diarrhea that has been ongoing for approximately 1 week.  Patient recently status post BKA left lower extremity, currently admitted to an inpatient rehab in order to help him acclimate to his prosthesis.  Patient reports arriving on Thursday the 18th of May.  Patient then began having nausea, diarrhea, abdominal cramping beginning on Saturday.  Patient reports symptoms continued, and he was started on Zosyn around Tuesday of this week (4 days ago) due to "an elevated white blood cell count".  Patient then continued to have diarrhea with intermittent abdominal cramping.  Patient reports symptoms have still persisted therefore requested to come to the emergency room for evaluation.  Patient is sent with records from the rehab facility.  Patient currently reports generalized abdominal cramping more so on the right upper and right lower quadrants with associated nausea and diarrhea    The history is provided by the patient.   Review of patient's allergies indicates:   Allergen Reactions    Sulfa (sulfonamide antibiotics) Other (See Comments)     Past Medical History:   Diagnosis Date    Cataract     Diabetes mellitus, type 2     Glaucoma     Hyperlipidemia     Neuropathy     Osteomyelitis      Past Surgical History:   Procedure Laterality Date    APPENDECTOMY      BELOW KNEE AMPUTATION OF LOWER EXTREMITY Left 12/21/2022    Procedure: AMPUTATION, BELOW KNEE;  Surgeon: William Aguayo MD;  Location: [facility];  Service: General;  Laterality: Left;    CARPAL TUNNEL RELEASE Bilateral     CATARACT EXTRACTION Left     FEMORAL ARTERY STENT Left     FOOT SURGERY      removal of rt 2nd toe Right      Family History "   Problem Relation Age of Onset    COPD Mother     Macular degeneration Mother     Hypertension Father     Cancer Father      Social History     Tobacco Use    Smoking status: Some Days     Packs/day: 0.25     Types: Cigarettes    Smokeless tobacco: Never   Substance Use Topics    Alcohol use: Never    Drug use: Never     Review of Systems   Constitutional:  Negative for chills, fatigue and fever.   HENT:  Negative for ear pain, rhinorrhea and sore throat.    Eyes:  Negative for photophobia and pain.   Respiratory:  Negative for cough, shortness of breath and wheezing.    Cardiovascular:  Negative for chest pain.   Gastrointestinal:  Positive for abdominal pain, diarrhea, nausea and vomiting.   Genitourinary:  Negative for dysuria.   Neurological:  Negative for dizziness, weakness and headaches.   All other systems reviewed and are negative.    Physical Exam     Initial Vitals [05/27/23 0144]   BP Pulse Resp Temp SpO2   125/75 65 18 98.4 °F (36.9 °C) 96 %      MAP       --         Physical Exam    Nursing note and vitals reviewed.  Constitutional: He appears well-developed and well-nourished.   HENT:   Head: Normocephalic and atraumatic.   Eyes: EOM are normal. Pupils are equal, round, and reactive to light.   Neck: Neck supple.   Normal range of motion.  Cardiovascular:  Normal rate, regular rhythm, normal heart sounds and intact distal pulses.     Exam reveals no gallop and no friction rub.       No murmur heard.  Pulmonary/Chest: Breath sounds normal. No respiratory distress.   Abdominal: Abdomen is soft. Bowel sounds are normal. He exhibits no distension. There is no abdominal tenderness.   Musculoskeletal:         General: Normal range of motion.      Cervical back: Normal range of motion and neck supple.     Neurological: He is alert and oriented to person, place, and time. He has normal strength.   Skin: Skin is warm and dry.   Psychiatric: He has a normal mood and affect. His behavior is normal. Judgment and  thought content normal.       ED Course   Procedures  Labs Reviewed   COMPREHENSIVE METABOLIC PANEL - Abnormal; Notable for the following components:       Result Value    Chloride 109 (*)     Albumin Level 3.3 (*)     All other components within normal limits   CBC WITH DIFFERENTIAL - Abnormal; Notable for the following components:    RBC 4.35 (*)     Hct 41.7 (*)     MCV 95.9 (*)     MCH 33.6 (*)     Platelet 118 (*)     MPV 11.8 (*)     All other components within normal limits   MAGNESIUM - Abnormal; Notable for the following components:    Magnesium Level 1.40 (*)     All other components within normal limits   LIPASE - Normal   CBC W/ AUTO DIFFERENTIAL    Narrative:     The following orders were created for panel order CBC Auto Differential.  Procedure                               Abnormality         Status                     ---------                               -----------         ------                     CBC with Differential[[id number]]        Abnormal            Final result                 Please view results for these tests on the individual orders.   EXTRA TUBES    Narrative:     The following orders were created for panel order EXTRA TUBES.  Procedure                               Abnormality         Status                     ---------                               -----------         ------                     Light Blue Top Hold[[id number]]                              In process                 Gold Top Hold[[id number]]                                    In process                   Please view results for these tests on the individual orders.   LIGHT BLUE TOP HOLD   GOLD TOP HOLD          Imaging Results              US Abdomen Limited (In process)  Result time 05/27/23 08:17:23      In process by Monique Kimble MD (05/27/23 08:17:23)                Initial Result:     Impression:    1. There is a single gallstone in the gallbladder. The gallbladder wall   measures 1.7 mm in thickness  which is within normal limits. There is mild   pericholecystic fluid near the gallbladder fundus. The sonographic [place]   sign is negative. Correlate with clinical and laboratory findings as   regards follow-up.  2. Details and other findings as noted above.               Narrative:    START OF REPORT:  Technique: Limited right upper quadrant abdominal ultrasound was   performed.    Comparison: None.    Clinical History: GB edema.    Findings:  Liver: The liver appears unremarkable and measures 16.7 centimeters in   greatest dimension.  Biliary ducts: The common bile duct is within normal limits at 4.5 mm in   diameter.  Gallbladder: There is a single gallstone in the gallbladder. The   gallbladder wall measures 1.7 mm in thickness which is within normal   limits. There is mild pericholecystic fluid near the gallbladder fundus.   The sonographic [place] sign is negative.  Pancreas: The pancreas cannot be definitively evaluated to to obscuration   by bowel gas.  Right kidney:  Dimensions: The right kidney measures 11.9 x 6.6 x 5.6 cm in dimension.   Mild pelviectasia is seen in the right kidney. Correlate with clinical and   laboratory findings as regards additional evaluation and follow up.                          Preliminary result by Monique Kimble MD (05/27/23 07:03:44)                   Narrative:    START OF REPORT:  Technique: Limited right upper quadrant abdominal ultrasound was performed.    Comparison: None.    Clinical History: GB edema.    Findings:  Liver: The liver appears unremarkable and measures 16.7 centimeters in greatest dimension.  Biliary ducts: The common bile duct is within normal limits at 4.5 mm in diameter.  Gallbladder: There is a single gallstone in the gallbladder. The gallbladder wall measures 1.7 mm in thickness which is within normal limits. There is mild pericholecystic fluid near the gallbladder fundus. The sonographic [place] sign is negative.  Pancreas: The pancreas  cannot be definitively evaluated to to obscuration by bowel gas.  Right kidney:  Dimensions: The right kidney measures 11.9 x 6.6 x 5.6 cm in dimension. Mild pelviectasia is seen in the right kidney. Correlate with clinical and laboratory findings as regards additional evaluation and follow up.      Impression:  1. There is a single gallstone in the gallbladder. The gallbladder wall measures 1.7 mm in thickness which is within normal limits. There is mild pericholecystic fluid near the gallbladder fundus. The sonographic [place] sign is negative. Correlate with clinical and laboratory findings as regards follow-up.  2. Details and other findings as noted above.                                         CT Abdomen Pelvis With Contrast (Final result)  Result time 05/27/23 07:14:25      Final result by Cristiano Holley MD (05/27/23 07:14:25)                   Impression:    Impression:    1. The gallbladder is distended with pericholecystic edema. A 3 mm stone is noted near the neck of gall baldder. No pericholecystic inflammatory fat stranding noted. Correlate with clinical and laboratory findings as cholecystitis is not excluded.    2. Details and other findings as discussed above.    No significant discrepancy with overnight report      Electronically signed by: Cristiano Holley  Date:    05/27/2023  Time:    07:14               Narrative:      Technique:CT of the abdomen and pelvis was performed with axial images as well as sagittal and coronal reconstruction images with intravenous contrast.    Comparison:None available.    Clinical History:Abdominal Pain (States currently in Rehab for Left BKA. Is having RUQ pain, nausea and vomiting since 8pm.    Dosage Information:Automated Exposure Control was utilized.      Findings:    Lines and Tubes:None.    Thorax:    Lungs:The visualized lung bases appear unremarkable.    Pleura:No effusions or thickening.    Heart:The heart size is within normal  limits.    Abdomen:    Abdominal Wall:No abdominal wall pathology is seen.    Liver: Fatty infiltration and marginal nodularity. This is suggestive of chronic liver parenchymal disease. There are no focal hepatic masses.    Biliary System:No intrahepatic or extrahepatic biliary duct dilatation is seen.    Gallbladder:The gallbladder is distended with pericholecystic edema. A 3 mm stone is noted near the neck of gall baldder. No pericholecystic inflammatory fat stranding noted.    Pancreas:The pancreas appears unremarkable.    Spleen:Mildly enlarged spleen.    Adrenals:The adrenal glands appear unremarkable.    Kidneys:The kidneys appear unremarkable with no stones cysts masses or hydronephrosis.    Aorta:The abdominal aorta appears unremarkable.    Bowel:    Esophagus:There appears to be mild thickening versus underdistention of the distal esophagus. Correlate clinically as regards reflux esophagitis.    Stomach:The stomach appears unremarkable.    Duodenum:Unremarkable appearing duodenum.    Small Bowel:The small bowel appears unremarkable.    Colon:There is moderate stool in the colon which could reflect an element of constipation.    Appendix:The appendix is not identified but no inflammatory changes are seen in the right lower quadrant to suggest appendicitis.    Peritoneum:No intraperitoneal free air or ascites is seen.    Pelvis:    Bladder:The bladder appears unremarkable.    Male:    Prostate gland:The prostate gland is mildly enlarged.    Bony structures:    Dorsal Spine:There is mild spondylosis of the visualized dorsal spine.    Bony Pelvis:The visualized bony structures of the pelvis appear unremarkable.    Notifications:The results were discussed with the emergency room physician (Dr Joy) prior to dictation at 2023-05-27 05:33:10 CDT.                        Preliminary result by Cristiano Holley MD (05/27/23 04:56:41)                   Narrative:    START OF REPORT:  Technique: CT of the abdomen  and pelvis was performed with axial images as well as sagittal and coronal reconstruction images with intravenous contrast.    Comparison: None available.    Clinical History: Abdominal Pain (States currently in Rehab for Left BKA. Is having RUQ pain, nausea and vomiting since 8pm.    Dosage Information: Automated Exposure Control was utilized.    Findings:  Lines and Tubes: None.  Thorax:  Lungs: The visualized lung bases appear unremarkable.  Pleura: No effusions or thickening.  Heart: The heart size is within normal limits.  Abdomen:  Abdominal Wall: No abdominal wall pathology is seen.  Liver: The liver is mildly enlarged with fatty infiltration and marginal nodularity. This is suggestive of chronic liver parenchymal disease. There are no focal hepatic masses.  Biliary System: No intrahepatic or extrahepatic biliary duct dilatation is seen.  Gallbladder: The gallbladder is distended with pericholecystic edema. A 3 mm stone is noted near the neck of gall baldder. No pericholecystic inflammatory fat stranding noted.  Pancreas: The pancreas appears unremarkable.  Spleen: Mildly enlarged spleen.  Adrenals: The adrenal glands appear unremarkable.  Kidneys: The kidneys appear unremarkable with no stones cysts masses or hydronephrosis.  Aorta: The abdominal aorta appears unremarkable.  IVC: Unremarkable.  Bowel:  Esophagus: There appears to be mild thickening versus underdistention of the distal esophagus. Correlate clinically as regards reflux esophagitis.  Stomach: The stomach appears unremarkable.  Duodenum: Unremarkable appearing duodenum.  Small Bowel: The small bowel appears unremarkable.  Colon: There is moderate stool in the colon which could reflect an element of constipation.  Appendix: The appendix is not identified but no inflammatory changes are seen in the right lower quadrant to suggest appendicitis.  Peritoneum: No intraperitoneal free air or ascites is seen.    Pelvis:  Bladder: The bladder appears  unremarkable.  Male:  Prostate gland: The prostate gland is mildly enlarged.    Bony structures:  Dorsal Spine: There is mild spondylosis of the visualized dorsal spine.  Bony Pelvis: The visualized bony structures of the pelvis appear unremarkable.    Notifications: The results were discussed with the emergency room physician (Dr Joy) prior to dictation at 2023-05-27 05:33:10 CDT.      Impression:  1. The gallbladder is distended with pericholecystic edema. A 3 mm stone is noted near the neck of gall baldder. No pericholecystic inflammatory fat stranding noted. Correlate with clinical and laboratory findings as cholecystitis is not excluded.  2. Details and other findings as discussed above.                                         Medications   lactated ringers bolus 1,000 mL (0 mLs Intravenous Stopped 5/27/23 0335)   ondansetron injection 4 mg (4 mg Intravenous Given 5/27/23 0236)   dicyclomine injection 20 mg (20 mg Intramuscular Given 5/27/23 0236)   magnesium sulfate 2g in water 50mL IVPB (premix) (0 g Intravenous Stopped 5/27/23 0451)   iohexoL (OMNIPAQUE 350) injection 100 mL (100 mLs Intravenous Given 5/27/23 0456)     Medical Decision Making:   History:   Old Records Summarized: records from another hospital.       <> Summary of Records: Ultrasound right upper quadrant performed 05/24/2023:  1.  Technically limited visualization due to patient's body habitus  2.  Hepatomegaly   Diffuse hepatic fatty infiltration.    Gallbladder is unremarkable, gallbladder appears normal in size and wall thickness, common bile duct measures 4.1 mm in diameter.    Patient had a stool sample collected on 05/25/2023 that consisted of a GI panel PCR which was negative.  C. Diff sample on 5/22 was negative.  Initial Assessment:   62-year-old gentleman presents emergency room complaints of abdominal cramping, nausea, vomiting, persistent diarrhea.  Currently nontoxic appearing.  Due to worsening abdominal cramping,  patient went to come the emergency room for further evaluation.  Will obtain laboratory evaluation including CBC CMP and magnesium level.  Will give 1 L of IV fluids while here in the emergency room.  Will likely obtain a CT scan the abdomen pelvis due to persistent symptoms for further evaluation.  Will continue to monitor.  Differential Diagnosis:   Gastroenteritis, colitis, diverticulitis  Additional MDM:   Differential Diagnosis:   Cholecystitis, cholelithiasis, biliary colic, colitis, other intra-abdominal pathology.           ED Course as of 05/27/23 0819   Sat May 27, 2023   0535 Notified by radiologist regarding edema of the gallbladder.  Appears to have stone at the neck of the gallbladder.  Will repeat ultrasound for further evaluation and to evaluate for acute cholecystitis.  No elevated liver enzymes. Patient currently resting comfortably. [MW]      ED Course User Index  [MW] Marcelo Joy MD          7:25 AM I assumed care at 7:00 a.m., case discussed in detail with Dr. Joy, data reviewed, patient interviewed and examined.  Currently at rehab after amputation as outlined, recent abdominal symptoms including diarrhea and cramps, recent acute kidney injury and leukocytosis with short course of Zosyn, stool studies have been negative.  Recent ultrasound fairly unremarkable but CT suggestive of gallbladder edema, repeat ultrasound pending this morning.  LFTs unremarkable.  Monitoring closely.    Freddy Bunch MD      8:15 AM Resting comfortably, vital signs stable, all symptoms resolved.  He indicates that his previous right upper quadrant abdominal pain has completely resolved.  Abdomen is non-tender and benign to exam at this point. Discussed findings in detail.  Recent outpatient ultrasound did not identify a gallstone, CT showed gallstone with signs of obstruction, subsequent ultrasound confirms gallstone with minimal pericholecystic fluid and no signs of obstruction or edema.  Labs  are fine and exam is benign.  It appears that he is having some intermittent partial gallbladder obstruction from this stone leading to biliary colic and the radiographic findings.  Appropriate for outpatient management, counseled regarding findings, low-fat diet, elective cholecystectomy, etc..  Is stable to return to rehab, he finishes rehab in about 6 days and at that point will be returning home to his house in [place].  He should be a good candidate for elective cholecystectomy at that point.  Strongly encouraged to quit smoking.  Stable for discharge.      Medical Decision Making  Problems Addressed:  Biliary colic: acute illness or injury    Amount and/or Complexity of Data Reviewed  External Data Reviewed: labs, radiology and notes.  Labs: ordered. Decision-making details documented in ED Course.  Radiology: ordered. Decision-making details documented in ED Course.    Risk  Prescription drug management.  Decision regarding hospitalization.               Clinical Impression:   Final diagnoses:  [K80.20] Calculus of gallbladder without cholecystitis without obstruction (Primary)  [K80.50] Biliary colic  [Z72.0] Tobacco abuse        ED Disposition Condition    Discharge Stable          ED Prescriptions    None       Follow-up Information       Follow up With Specialties Details Why Contact Info    [facility] Emergency Medicine  As needed [address] [facility] [id number] [phone]    [facility]   If symptoms worsen [address] [place] [address] [phone]               Freddy Bunch MD  05/27/23 1256

## 2023-06-02 NOTE — ED NOTES
[facility] Surgery clinic does not accept [employer] insurance, referral faxed to Dr. Thomas

## 2023-06-08 ENCOUNTER — CLINICAL SUPPORT (OUTPATIENT)
Dept: PREADMISSION TESTING | Facility: HOSPITAL | Age: 63
End: 2023-06-08
Attending: SURGERY
Payer: MEDICARE

## 2023-06-08 DIAGNOSIS — Z01.818 OTHER SPECIFIED PRE-OPERATIVE EXAMINATION: ICD-10-CM

## 2023-06-08 DIAGNOSIS — Z01.818 OTHER SPECIFIED PRE-OPERATIVE EXAMINATION: Primary | ICD-10-CM

## 2023-06-08 PROCEDURE — 93010 ELECTROCARDIOGRAM REPORT: CPT | Mod: ,,, | Performed by: INTERNAL MEDICINE

## 2023-06-08 PROCEDURE — 93005 ELECTROCARDIOGRAM TRACING: CPT

## 2023-06-08 PROCEDURE — 93010 EKG 12-LEAD: ICD-10-PCS | Mod: ,,, | Performed by: INTERNAL MEDICINE

## 2023-06-13 ENCOUNTER — ANESTHESIA EVENT (OUTPATIENT)
Dept: SURGERY | Facility: HOSPITAL | Age: 63
End: 2023-06-13
Payer: MEDICARE

## 2023-06-13 PROBLEM — K80.20 GALLSTONES: Status: ACTIVE | Noted: 2023-06-13

## 2023-06-13 RX ORDER — ACETAMINOPHEN 500 MG
1000 TABLET ORAL ONCE
Status: CANCELLED | OUTPATIENT
Start: 2023-06-13 | End: 2023-06-13

## 2023-06-14 ENCOUNTER — ANESTHESIA (OUTPATIENT)
Dept: SURGERY | Facility: HOSPITAL | Age: 63
End: 2023-06-14
Payer: MEDICARE

## 2023-06-14 ENCOUNTER — HOSPITAL ENCOUNTER (OUTPATIENT)
Facility: HOSPITAL | Age: 63
Discharge: HOME | End: 2023-06-14
Attending: SURGERY | Admitting: SURGERY
Payer: MEDICARE

## 2023-06-14 DIAGNOSIS — K80.20 CALCULUS OF GALLBLADDER WITHOUT CHOLECYSTITIS WITHOUT OBSTRUCTION: ICD-10-CM

## 2023-06-14 DIAGNOSIS — K76.9 LIVER DISEASE: ICD-10-CM

## 2023-06-14 LAB
POCT GLUCOSE: 157 MG/DL (ref 70–110)
POCT GLUCOSE: 166 MG/DL (ref 70–110)

## 2023-06-14 PROCEDURE — 27201423 OPTIME MED/SURG SUP & DEVICES STERILE SUPPLY: Performed by: SURGERY

## 2023-06-14 PROCEDURE — 25000003 PHARM REV CODE 250: Performed by: NURSE PRACTITIONER

## 2023-06-14 PROCEDURE — 63600175 PHARM REV CODE 636 W HCPCS: Performed by: NURSE ANESTHETIST, CERTIFIED REGISTERED

## 2023-06-14 PROCEDURE — D9220A PRA ANESTHESIA: Mod: CRNA,,, | Performed by: NURSE ANESTHETIST, CERTIFIED REGISTERED

## 2023-06-14 PROCEDURE — 25000003 PHARM REV CODE 250: Performed by: SURGERY

## 2023-06-14 PROCEDURE — 37000009 HC ANESTHESIA EA ADD 15 MINS: Performed by: SURGERY

## 2023-06-14 PROCEDURE — D9220A PRA ANESTHESIA: Mod: ANES,,, | Performed by: ANESTHESIOLOGY

## 2023-06-14 PROCEDURE — 36000709 HC OR TIME LEV III EA ADD 15 MIN: Performed by: SURGERY

## 2023-06-14 PROCEDURE — 36000708 HC OR TIME LEV III 1ST 15 MIN: Performed by: SURGERY

## 2023-06-14 PROCEDURE — 82962 GLUCOSE BLOOD TEST: CPT | Mod: 91 | Performed by: SURGERY

## 2023-06-14 PROCEDURE — 71000033 HC RECOVERY, INTIAL HOUR: Performed by: SURGERY

## 2023-06-14 PROCEDURE — 37000008 HC ANESTHESIA 1ST 15 MINUTES: Performed by: SURGERY

## 2023-06-14 PROCEDURE — 63600175 PHARM REV CODE 636 W HCPCS: Performed by: NURSE PRACTITIONER

## 2023-06-14 PROCEDURE — 88307 TISSUE EXAM BY PATHOLOGIST: CPT

## 2023-06-14 PROCEDURE — 82962 GLUCOSE BLOOD TEST: CPT | Performed by: SURGERY

## 2023-06-14 PROCEDURE — 88304 TISSUE EXAM BY PATHOLOGIST: CPT | Performed by: SURGERY

## 2023-06-14 PROCEDURE — 71000015 HC POSTOP RECOV 1ST HR: Performed by: SURGERY

## 2023-06-14 PROCEDURE — 63600175 PHARM REV CODE 636 W HCPCS: Performed by: ANESTHESIOLOGY

## 2023-06-14 PROCEDURE — 25000003 PHARM REV CODE 250: Performed by: NURSE ANESTHETIST, CERTIFIED REGISTERED

## 2023-06-14 PROCEDURE — D9220A PRA ANESTHESIA: ICD-10-PCS | Mod: ANES,,, | Performed by: ANESTHESIOLOGY

## 2023-06-14 PROCEDURE — 71000016 HC POSTOP RECOV ADDL HR: Performed by: SURGERY

## 2023-06-14 PROCEDURE — D9220A PRA ANESTHESIA: ICD-10-PCS | Mod: CRNA,,, | Performed by: NURSE ANESTHETIST, CERTIFIED REGISTERED

## 2023-06-14 PROCEDURE — 25000003 PHARM REV CODE 250: Performed by: ANESTHESIOLOGY

## 2023-06-14 RX ORDER — MEPERIDINE HYDROCHLORIDE 25 MG/ML
50 INJECTION INTRAMUSCULAR; INTRAVENOUS; SUBCUTANEOUS EVERY 4 HOURS PRN
Status: DISCONTINUED | OUTPATIENT
Start: 2023-06-14 | End: 2023-06-14 | Stop reason: HOSPADM

## 2023-06-14 RX ORDER — ENOXAPARIN SODIUM 100 MG/ML
40 INJECTION SUBCUTANEOUS
Status: COMPLETED | OUTPATIENT
Start: 2023-06-14 | End: 2023-06-14

## 2023-06-14 RX ORDER — MIDAZOLAM HYDROCHLORIDE 1 MG/ML
2 INJECTION INTRAMUSCULAR; INTRAVENOUS
Status: DISPENSED | OUTPATIENT
Start: 2023-06-14 | End: 2023-06-14

## 2023-06-14 RX ORDER — BUPIVACAINE HYDROCHLORIDE 2.5 MG/ML
INJECTION, SOLUTION EPIDURAL; INFILTRATION; INTRACAUDAL
Status: DISCONTINUED
Start: 2023-06-14 | End: 2023-06-14 | Stop reason: HOSPADM

## 2023-06-14 RX ORDER — LIDOCAINE HYDROCHLORIDE 10 MG/ML
1 INJECTION, SOLUTION EPIDURAL; INFILTRATION; INTRACAUDAL; PERINEURAL ONCE
Status: DISCONTINUED | OUTPATIENT
Start: 2023-06-14 | End: 2023-06-15 | Stop reason: HOSPADM

## 2023-06-14 RX ORDER — PROCHLORPERAZINE EDISYLATE 5 MG/ML
5 INJECTION INTRAMUSCULAR; INTRAVENOUS EVERY 6 HOURS PRN
Status: DISCONTINUED | OUTPATIENT
Start: 2023-06-14 | End: 2023-06-15 | Stop reason: HOSPADM

## 2023-06-14 RX ORDER — DIPHENHYDRAMINE HYDROCHLORIDE 50 MG/ML
25 INJECTION INTRAMUSCULAR; INTRAVENOUS EVERY 6 HOURS PRN
Status: DISCONTINUED | OUTPATIENT
Start: 2023-06-14 | End: 2023-06-15 | Stop reason: HOSPADM

## 2023-06-14 RX ORDER — ACETAMINOPHEN 500 MG
1000 TABLET ORAL
Status: COMPLETED | OUTPATIENT
Start: 2023-06-14 | End: 2023-06-14

## 2023-06-14 RX ORDER — HYDROCODONE BITARTRATE AND ACETAMINOPHEN 7.5; 325 MG/1; MG/1
1 TABLET ORAL EVERY 6 HOURS PRN
Status: DISCONTINUED | OUTPATIENT
Start: 2023-06-14 | End: 2023-06-15 | Stop reason: HOSPADM

## 2023-06-14 RX ORDER — ROCURONIUM BROMIDE 10 MG/ML
INJECTION, SOLUTION INTRAVENOUS
Status: DISCONTINUED | OUTPATIENT
Start: 2023-06-14 | End: 2023-06-14

## 2023-06-14 RX ORDER — METHOCARBAMOL 100 MG/ML
INJECTION, SOLUTION INTRAMUSCULAR; INTRAVENOUS
Status: DISCONTINUED
Start: 2023-06-14 | End: 2023-06-15 | Stop reason: HOSPADM

## 2023-06-14 RX ORDER — ONDANSETRON HYDROCHLORIDE 2 MG/ML
INJECTION, SOLUTION INTRAMUSCULAR; INTRAVENOUS
Status: DISCONTINUED | OUTPATIENT
Start: 2023-06-14 | End: 2023-06-14

## 2023-06-14 RX ORDER — LIDOCAINE HYDROCHLORIDE 10 MG/ML
INJECTION, SOLUTION EPIDURAL; INFILTRATION; INTRACAUDAL; PERINEURAL
Status: DISCONTINUED | OUTPATIENT
Start: 2023-06-14 | End: 2023-06-14

## 2023-06-14 RX ORDER — SODIUM CHLORIDE, SODIUM LACTATE, POTASSIUM CHLORIDE, CALCIUM CHLORIDE 600; 310; 30; 20 MG/100ML; MG/100ML; MG/100ML; MG/100ML
INJECTION, SOLUTION INTRAVENOUS CONTINUOUS
Status: DISCONTINUED | OUTPATIENT
Start: 2023-06-14 | End: 2023-06-15 | Stop reason: HOSPADM

## 2023-06-14 RX ORDER — ONDANSETRON 2 MG/ML
4 INJECTION INTRAMUSCULAR; INTRAVENOUS EVERY 6 HOURS PRN
Status: DISCONTINUED | OUTPATIENT
Start: 2023-06-14 | End: 2023-06-15 | Stop reason: HOSPADM

## 2023-06-14 RX ORDER — CEFAZOLIN SODIUM 1 G/3ML
INJECTION, POWDER, FOR SOLUTION INTRAMUSCULAR; INTRAVENOUS
Status: DISCONTINUED
Start: 2023-06-14 | End: 2023-06-14 | Stop reason: HOSPADM

## 2023-06-14 RX ORDER — METHOCARBAMOL 100 MG/ML
1000 INJECTION, SOLUTION INTRAMUSCULAR; INTRAVENOUS ONCE
Status: COMPLETED | OUTPATIENT
Start: 2023-06-14 | End: 2023-06-14

## 2023-06-14 RX ORDER — HYDROMORPHONE HYDROCHLORIDE 2 MG/ML
0.2 INJECTION, SOLUTION INTRAMUSCULAR; INTRAVENOUS; SUBCUTANEOUS EVERY 5 MIN PRN
Status: DISCONTINUED | OUTPATIENT
Start: 2023-06-14 | End: 2023-06-14

## 2023-06-14 RX ORDER — EPHEDRINE SULFATE 50 MG/ML
INJECTION, SOLUTION INTRAVENOUS
Status: DISCONTINUED | OUTPATIENT
Start: 2023-06-14 | End: 2023-06-14

## 2023-06-14 RX ORDER — GABAPENTIN 300 MG/1
300 CAPSULE ORAL
Status: COMPLETED | OUTPATIENT
Start: 2023-06-14 | End: 2023-06-14

## 2023-06-14 RX ORDER — BUPIVACAINE HYDROCHLORIDE 2.5 MG/ML
INJECTION, SOLUTION EPIDURAL; INFILTRATION; INTRACAUDAL
Status: DISCONTINUED | OUTPATIENT
Start: 2023-06-14 | End: 2023-06-14 | Stop reason: HOSPADM

## 2023-06-14 RX ORDER — PROPOFOL 10 MG/ML
VIAL (ML) INTRAVENOUS
Status: DISCONTINUED | OUTPATIENT
Start: 2023-06-14 | End: 2023-06-14

## 2023-06-14 RX ORDER — HYDROMORPHONE HYDROCHLORIDE 2 MG/ML
0.4 INJECTION, SOLUTION INTRAMUSCULAR; INTRAVENOUS; SUBCUTANEOUS EVERY 5 MIN PRN
Status: DISCONTINUED | OUTPATIENT
Start: 2023-06-14 | End: 2023-06-15 | Stop reason: HOSPADM

## 2023-06-14 RX ORDER — MEPERIDINE HYDROCHLORIDE 25 MG/ML
12.5 INJECTION INTRAMUSCULAR; INTRAVENOUS; SUBCUTANEOUS EVERY 10 MIN PRN
Status: DISCONTINUED | OUTPATIENT
Start: 2023-06-14 | End: 2023-06-15 | Stop reason: HOSPADM

## 2023-06-14 RX ORDER — SODIUM CITRATE AND CITRIC ACID MONOHYDRATE 334; 500 MG/5ML; MG/5ML
30 SOLUTION ORAL ONCE
Status: COMPLETED | OUTPATIENT
Start: 2023-06-14 | End: 2023-06-14

## 2023-06-14 RX ORDER — SODIUM CHLORIDE, SODIUM GLUCONATE, SODIUM ACETATE, POTASSIUM CHLORIDE AND MAGNESIUM CHLORIDE 30; 37; 368; 526; 502 MG/100ML; MG/100ML; MG/100ML; MG/100ML; MG/100ML
INJECTION, SOLUTION INTRAVENOUS CONTINUOUS
Status: DISCONTINUED | OUTPATIENT
Start: 2023-06-14 | End: 2023-06-15 | Stop reason: HOSPADM

## 2023-06-14 RX ORDER — HYDROMORPHONE HYDROCHLORIDE 2 MG/ML
0.2 INJECTION, SOLUTION INTRAMUSCULAR; INTRAVENOUS; SUBCUTANEOUS EVERY 5 MIN PRN
Status: DISCONTINUED | OUTPATIENT
Start: 2023-06-14 | End: 2023-06-15 | Stop reason: HOSPADM

## 2023-06-14 RX ORDER — FENTANYL CITRATE 50 UG/ML
INJECTION, SOLUTION INTRAMUSCULAR; INTRAVENOUS
Status: DISCONTINUED | OUTPATIENT
Start: 2023-06-14 | End: 2023-06-14

## 2023-06-14 RX ORDER — ONDANSETRON 4 MG/1
4 TABLET, ORALLY DISINTEGRATING ORAL
Status: COMPLETED | OUTPATIENT
Start: 2023-06-14 | End: 2023-06-14

## 2023-06-14 RX ORDER — ONDANSETRON 2 MG/ML
4 INJECTION INTRAMUSCULAR; INTRAVENOUS DAILY PRN
Status: DISCONTINUED | OUTPATIENT
Start: 2023-06-14 | End: 2023-06-15 | Stop reason: HOSPADM

## 2023-06-14 RX ORDER — PHENYLEPHRINE HYDROCHLORIDE 10 MG/ML
INJECTION INTRAVENOUS
Status: DISCONTINUED | OUTPATIENT
Start: 2023-06-14 | End: 2023-06-14

## 2023-06-14 RX ORDER — TRAMADOL HYDROCHLORIDE 50 MG/1
50 TABLET ORAL EVERY 4 HOURS PRN
Status: DISCONTINUED | OUTPATIENT
Start: 2023-06-14 | End: 2023-06-15 | Stop reason: HOSPADM

## 2023-06-14 RX ORDER — CELECOXIB 200 MG/1
200 CAPSULE ORAL
Status: DISCONTINUED | OUTPATIENT
Start: 2023-06-14 | End: 2023-06-14 | Stop reason: HOSPADM

## 2023-06-14 RX ORDER — PROCHLORPERAZINE EDISYLATE 5 MG/ML
5 INJECTION INTRAMUSCULAR; INTRAVENOUS EVERY 30 MIN PRN
Status: DISCONTINUED | OUTPATIENT
Start: 2023-06-14 | End: 2023-06-15 | Stop reason: HOSPADM

## 2023-06-14 RX ORDER — MORPHINE SULFATE 4 MG/ML
1 INJECTION, SOLUTION INTRAMUSCULAR; INTRAVENOUS
Status: DISCONTINUED | OUTPATIENT
Start: 2023-06-14 | End: 2023-06-15 | Stop reason: HOSPADM

## 2023-06-14 RX ADMIN — HYDROMORPHONE HYDROCHLORIDE 0.4 MG: 2 INJECTION INTRAMUSCULAR; INTRAVENOUS; SUBCUTANEOUS at 01:06

## 2023-06-14 RX ADMIN — PROPOFOL 175 MG: 10 INJECTION, EMULSION INTRAVENOUS at 12:06

## 2023-06-14 RX ADMIN — METHOCARBAMOL 1000 MG: 100 INJECTION INTRAMUSCULAR; INTRAVENOUS at 01:06

## 2023-06-14 RX ADMIN — MIDAZOLAM HYDROCHLORIDE 2 MG: 1 INJECTION, SOLUTION INTRAMUSCULAR; INTRAVENOUS at 12:06

## 2023-06-14 RX ADMIN — FENTANYL CITRATE 50 MCG: 50 INJECTION, SOLUTION INTRAMUSCULAR; INTRAVENOUS at 12:06

## 2023-06-14 RX ADMIN — SODIUM CITRATE AND CITRIC ACID MONOHYDRATE 30 ML: 500; 334 SOLUTION ORAL at 11:06

## 2023-06-14 RX ADMIN — LIDOCAINE HYDROCHLORIDE 50 MG: 10 INJECTION, SOLUTION EPIDURAL; INFILTRATION; INTRACAUDAL; PERINEURAL at 12:06

## 2023-06-14 RX ADMIN — ENOXAPARIN SODIUM 40 MG: 40 INJECTION SUBCUTANEOUS at 11:06

## 2023-06-14 RX ADMIN — PROPOFOL 25 MG: 10 INJECTION, EMULSION INTRAVENOUS at 01:06

## 2023-06-14 RX ADMIN — PHENYLEPHRINE HYDROCHLORIDE 100 MCG: 10 INJECTION INTRAVENOUS at 01:06

## 2023-06-14 RX ADMIN — GABAPENTIN 300 MG: 300 CAPSULE ORAL at 11:06

## 2023-06-14 RX ADMIN — ONDANSETRON 4 MG: 2 INJECTION INTRAMUSCULAR; INTRAVENOUS at 01:06

## 2023-06-14 RX ADMIN — DEXTROSE MONOHYDRATE 1 G: 2.5 INJECTION INTRAVENOUS at 12:06

## 2023-06-14 RX ADMIN — EPHEDRINE SULFATE 20 MG: 50 INJECTION INTRAVENOUS at 12:06

## 2023-06-14 RX ADMIN — FENTANYL CITRATE 50 MCG: 50 INJECTION, SOLUTION INTRAMUSCULAR; INTRAVENOUS at 01:06

## 2023-06-14 RX ADMIN — PHENYLEPHRINE HYDROCHLORIDE 100 MCG: 10 INJECTION INTRAVENOUS at 12:06

## 2023-06-14 RX ADMIN — ROCURONIUM BROMIDE 50 MG: 50 INJECTION INTRAVENOUS at 12:06

## 2023-06-14 RX ADMIN — SUGAMMADEX 200 MG: 100 INJECTION, SOLUTION INTRAVENOUS at 01:06

## 2023-06-14 RX ADMIN — ONDANSETRON 4 MG: 4 TABLET, ORALLY DISINTEGRATING ORAL at 11:06

## 2023-06-14 RX ADMIN — SODIUM CHLORIDE, POTASSIUM CHLORIDE, SODIUM LACTATE AND CALCIUM CHLORIDE: 600; 310; 30; 20 INJECTION, SOLUTION INTRAVENOUS at 01:06

## 2023-06-14 RX ADMIN — SODIUM CHLORIDE, POTASSIUM CHLORIDE, SODIUM LACTATE AND CALCIUM CHLORIDE: 600; 310; 30; 20 INJECTION, SOLUTION INTRAVENOUS at 11:06

## 2023-06-14 RX ADMIN — SUGAMMADEX 50 MG: 100 INJECTION, SOLUTION INTRAVENOUS at 01:06

## 2023-06-14 RX ADMIN — ACETAMINOPHEN 1000 MG: 500 TABLET, FILM COATED ORAL at 11:06

## 2023-06-14 NOTE — DISCHARGE INSTRUCTIONS
Dr. Thomas's Discharge Instructions   Laparoscopic Cholecystectomy     Dr. Thomas's Sutured Wound Care Instructions:    - Keep surgical dressing clean and dry for 48 hours post op, then ok to remove dressing and shower.  - Wash incision daily with antibacterial soap and water. Pat dry.   - Do not remove steri strips for 5-7 days post op. After post op day 7, ok to remove steri strips once edges of steri strips begin to curl. Do not keep steri strips in place longer than 10 days after surgery.     No lifting (over 10 pounds) or straining for 2 weeks after surgery.    No driving for 3 days after surgery, or as long as taking narcotic pain medication.     Follow low fat diet (gallbladder eating plan listed below) for the first 4 weeks after surgery. After 4 weeks, ok to advance to regular diet as tolerated.     Contact Dr. Thomas's office with any questions or concerns. [phone]      Cholelithiasis    Cholelithiasis is a form of gallbladder disease in which gallstones form in the gallbladder. The gallbladder is an organ that stores bile. Bile is made in the liver, and it helps to digest fats. Gallstones begin as small crystals and slowly grow into stones. They may cause no symptoms until the gallbladder tightens (contracts) and a gallstone is blocking the duct (gallbladder attack), which can cause pain. Cholelithiasis is also referred to as gallstones.  There are two main types of gallstones:   Cholesterol stones. These are made of hardened cholesterol and are usually yellow-green in color. They are the most common type of gallstone. Cholesterol is a white, waxy, fat-like substance that is made in the liver.   Pigment stones. These are dark in color and are made of a red-yellow substance that forms when hemoglobin from red blood cells breaks down (bilirubin).  What are the causes?  This condition may be caused by an imbalance in the substances that bile is made of. This can happen if the bile:   Has too  much bilirubin.   Has too much cholesterol.   Does not have enough bile salts. These salts help the body absorb and digest fats.  In some cases, this condition can also be caused by the gallbladder not emptying completely or often enough.  What increases the risk?  The following factors may make you more likely to develop this condition:   Being female.   Having multiple pregnancies. Health care providers sometimes advise removing diseased gallbladders before future pregnancies.   Eating a diet that is heavy in fried foods, fat, and refined carbohydrates, like white bread and white rice.   Being obese.   Being older than age 40.   Prolonged use of medicines that contain female hormones (estrogen).   Having diabetes mellitus.   Rapidly losing weight.   Having a family history of gallstones.   Being of  or [place] descent.   Having an intestinal disease such as Crohn disease.   Having metabolic syndrome.   Having cirrhosis.   Having severe types of anemia such as sickle cell anemia.  What are the signs or symptoms?  In most cases, there are no symptoms. These are known as silent gallstones. If a gallstone blocks the bile ducts, it can cause a gallbladder attack. The main symptom of a gallbladder attack is sudden pain in the upper right abdomen. The pain usually comes at night or after eating a large meal. The pain can last for one or several hours and can spread to the right shoulder or chest.  If the bile duct is blocked for more than a few hours, it can cause infection or inflammation of the gallbladder, liver, or pancreas, which may cause:   Nausea.   Vomiting.   Abdominal pain that lasts for 5 hours or more.   Fever or chills.   Yellowing of the skin or the whites of the eyes (jaundice).   Dark urine.   Light-colored stools.  How is this diagnosed?  This condition may be diagnosed based on:   A physical exam.   Your medical history.   An ultrasound of your gallbladder.   CT scan.   MRI.   Blood  tests to check for signs of infection or inflammation.   A scan of your gallbladder and bile ducts (biliary system) using nonharmful radioactive material and special cameras that can see the radioactive material (cholescintigram). This test checks to see how your gallbladder contracts and whether bile ducts are blocked.   Inserting a small tube with a camera on the end (endoscope) through your mouth to inspect bile ducts and check for blockages (endoscopic retrograde cholangiopancreatogram).  How is this treated?  Treatment for gallstones depends on the severity of the condition. Silent gallstones do not need treatment. If the gallstones cause a gallbladder attack or other symptoms, treatment may be required. Options for treatment include:   Surgery to remove the gallbladder (cholecystectomy). This is the most common treatment.   Medicines to dissolve gallstones. These are most effective at treating small gallstones. You may need to take medicines for up to 6-12 months.   Shock wave treatment (extracorporeal biliary lithotripsy). In this treatment, an ultrasound machine sends shock waves to the gallbladder to break gallstones into smaller pieces. These pieces can then be passed into the intestines or be dissolved by medicine. This is rarely used.   Removing gallstones through endoscopic retrograde cholangiopancreatogram. A small basket can be attached to the endoscope and used to capture and remove gallstones.  Follow these instructions at home:   Take over-the-counter and prescription medicines only as told by your health care provider.   Maintain a healthy weight and follow a healthy diet. This includes:  ? Reducing fatty foods, such as fried food.  ? Reducing refined carbohydrates, like white bread and white rice.  ? Increasing fiber. Aim for foods like almonds, fruit, and beans.   Keep all follow-up visits as told by your health care provider. This is important.  Contact a health care provider if:   You think  you have had a gallbladder attack.   You have been diagnosed with silent gallstones and you develop abdominal pain or indigestion.  Get help right away if:   You have pain from a gallbladder attack that lasts for more than 2 hours.   You have abdominal pain that lasts for more than 5 hours.   You have a fever or chills.   You have persistent nausea and vomiting.   You develop jaundice.   You have dark urine or light-colored stools.  Summary   Cholelithiasis (also called gallstones) is a form of gallbladder disease in which gallstones form in the gallbladder.   This condition is caused by an imbalance in the substances that make up bile. This can happen if the bile has too much cholesterol, too much bilirubin, or not enough bile salts.   You are more likely to develop this condition if you are female, pregnant, using medicines with estrogen, obese, older than age 40, or have a family history of gallstones. You may also develop gallstones if you have diabetes, an intestinal disease, cirrhosis, or metabolic syndrome.   Treatment for gallstones depends on the severity of the condition. Silent gallstones do not need treatment.   If gallstones cause a gallbladder attack or other symptoms, treatment may be needed. The most common treatment is surgery to remove the gallbladder.  This information is not intended to replace advice given to you by your health care provider. Make sure you discuss any questions you have with your health care provider.  Document Released: 12/14/2006 Document Revised: 11/30/2018 Document Reviewed: 09/03/2017  [employer] Patient Education © 2020 [employer]    Laparoscopic Cholecystectomy  Laparoscopic cholecystectomy is surgery to remove the gallbladder. The gallbladder is a pear-shaped organ that lies beneath the liver on the right side of the body. The gallbladder stores bile, which is a fluid that helps the body to digest fats. Cholecystectomy is often done for inflammation of the gallbladder  (cholecystitis). This condition is usually caused by a buildup of gallstones (cholelithiasis) in the gallbladder. Gallstones can block the flow of bile, which can result in inflammation and pain. In severe cases, emergency surgery may be required.  This procedure is done though small incisions in your abdomen (laparoscopic surgery). A thin scope with a camera (laparoscope) is inserted through one incision. Thin surgical instruments are inserted through the other incisions. In some cases, a laparoscopic procedure may be turned into a type of surgery that is done through a larger incision (open surgery).  What happens during the procedure?     To reduce your risk of infection:  ? Your health care team will wash or sanitize their hands.  ? Your skin will be washed with soap.  ? Hair may be removed from the surgical area.   An IV tube may be inserted into one of your veins.   You will be given one or more of the following:  ? A medicine to help you relax (sedative).  ? A medicine to make you fall asleep (general anesthetic).   A breathing tube will be placed in your mouth.   Your surgeon will make several small cuts (incisions) in your abdomen.   The laparoscope will be inserted through one of the small incisions. The camera on the laparoscope will send images to a TV screen (monitor) in the operating room. This lets your surgeon see inside your abdomen.   Air-like gas will be pumped into your abdomen. This will expand your abdomen to give the surgeon more room to perform the surgery.   Other tools that are needed for the procedure will be inserted through the other incisions. The gallbladder will be removed through one of the incisions.   Your common bile duct may be examined. If stones are found in the common bile duct, they may be removed.   After your gallbladder has been removed, the incisions will be closed with stitches (sutures), staples, or skin glue.   Your incisions may be covered with a bandage  (dressing).  The procedure may vary among health care providers and hospitals.  What happens after the procedure?   Your blood pressure, heart rate, breathing rate, and blood oxygen level will be monitored until the medicines you were given have worn off.   You will be given medicines as needed to control your pain.    This information is not intended to replace advice given to you by your health care provider. Make sure you discuss any questions you have with your health care provider.  Document Released: 12/18/2006 Document Revised: 11/30/2018 Document Reviewed: 06/05/2017  [employer] Patient Education © 2020 [employer]    Gallbladder Eating Plan  If you have a gallbladder condition, you may have trouble digesting fats. Eating a low-fat diet can help reduce your symptoms, and may be helpful before and after having surgery to remove your gallbladder (cholecystectomy). Your health care provider may recommend that you work with a diet and nutrition specialist (dietitian) to help you reduce the amount of fat in your diet.  What are tips for following this plan?  General guidelines   Limit your fat intake to less than 30% of your total daily calories. If you eat around 1,800 calories each day, this is less than 60 grams (g) of fat per day.   Fat is an important part of a healthy diet. Eating a low-fat diet can make it hard to maintain a healthy body weight. Ask your dietitian how much fat, calories, and other nutrients you need each day.   Eat small, frequent meals throughout the day instead of three large meals.   Drink at least 8-10 cups of fluid a day. Drink enough fluid to keep your urine clear or pale yellow.   Limit alcohol intake to no more than 1 drink a day for nonpregnant women and 2 drinks a day for men. One drink equals 12 oz of beer, 5 oz of wine, or 1½ oz of hard liquor.  Reading food labels   Check Nutrition Facts on food labels for the amount of fat per serving. Choose foods with less than 3 grams of  fat per serving.  Shopping   Choose nonfat and low-fat healthy foods. Look for the words nonfat, low fat, or fat free.   Avoid buying processed or prepackaged foods.  Cooking   Cook using low-fat methods, such as baking, broiling, grilling, or boiling.   Cook with small amounts of healthy fats, such as olive oil, grapeseed oil, canola oil, or sunflower oil.  What foods are recommended?     All fresh, frozen, or canned fruits and vegetables.   Whole grains.   Low-fat or non-fat (skim) milk and yogurt.   Lean meat, skinless poultry, fish, eggs, and beans.   Low-fat protein supplement powders or drinks.   Spices and herbs.  What foods are not recommended?   High-fat foods. These include baked goods, fast food, fatty cuts of meat, ice cream, french toast, sweet rolls, pizza, cheese bread, foods covered with butter, creamy sauces, or cheese.   Fried foods. These include french fries, tempura, battered fish, breaded chicken, fried breads, and sweets.   Foods with strong odors.   Foods that cause bloating and gas.  Summary   A low-fat diet can be helpful if you have a gallbladder condition, or before and after gallbladder surgery.   Limit your fat intake to less than 30% of your total daily calories. This is about 60 g of fat if you eat 1,800 calories each day.   Eat small, frequent meals throughout the day instead of three large meals.  This information is not intended to replace advice given to you by your health care provider. Make sure you discuss any questions you have with your health care provider.  Document Released: 12/23/2014 Document Revised: 04/09/2020 Document Reviewed: 01/25/2018  [employer] Patient Education © 2020 [employer]    Laparoscopic Cholecystectomy, Care After  This sheet gives you information about how to care for yourself after your procedure. Your health care provider may also give you more specific instructions. If you have problems or questions, contact your health care provider.  What  can I expect after the procedure?  After the procedure, it is common to have:   Pain at your incision sites. You will be given medicines to control this pain.   Mild nausea or vomiting.   Bloating and possible shoulder pain from the air-like gas that was used during the procedure.  Follow these instructions at home:  Incision care     Follow instructions from your health care provider about how to take care of your incisions. Make sure you:  ? Wash your hands with soap and water before you change your bandage (dressing). If soap and water are not available, use hand .  ? Change your dressing as told by your health care provider.  ? Leave stitches (sutures), skin glue, or adhesive strips in place. These skin closures may need to be in place for 2 weeks or longer. If adhesive strip edges start to loosen and curl up, you may trim the loose edges. Do not remove adhesive strips completely unless your health care provider tells you to do that.   Do not take baths, swim, or use a hot tub until your health care provider approves. Ask your health care provider if you can take showers. You may only be allowed to take sponge baths for bathing.   Check your incision area every day for signs of infection. Check for:  ? More redness, swelling, or pain.  ? More fluid or blood.  ? Warmth.  ? Pus or a bad smell.  Activity   Do not drive or use heavy machinery while taking prescription pain medicine.   Do not lift anything that is heavier than 10 lb (4.5 kg) until your health care provider approves.   Do not play contact sports until your health care provider approves.   Rest as needed. Do not return to work or school until your health care provider approves.  General instructions   Take over-the-counter and prescription medicines only as told by your health care provider.   To prevent or treat constipation while you are taking prescription pain medicine, your health care provider may recommend that you:  ? Drink enough  fluid to keep your urine clear or pale yellow.  ? Take over-the-counter or prescription medicines.  ? Eat foods that are high in fiber, such as fresh fruits and vegetables, whole grains, and beans.  ? Limit foods that are high in fat and processed sugars, such as fried and sweet foods.  Contact a health care provider if:   You develop a rash.   You have more redness, swelling, or pain around your incisions.   You have more fluid or blood coming from your incisions.   Your incisions feel warm to the touch.   You have pus or a bad smell coming from your incisions.   You have a fever.   One or more of your incisions breaks open.  Get help right away if:   You have trouble breathing.   You have chest pain.   You have increasing pain in your shoulders.   You faint or feel dizzy when you stand.   You have severe pain in your abdomen.   You have nausea or vomiting that lasts for more than one day.   You have leg pain.  This information is not intended to replace advice given to you by your health care provider. Make sure you discuss any questions you have with your health care provider.  Document Released: 12/18/2006 Document Revised: 11/30/2018 Document Reviewed: 06/05/2017  [employer] Patient Education © 2020 [employer]

## 2023-06-14 NOTE — OP NOTE
[facility] Surgical - Periop Services  Surgery Department  Operative Note    SUMMARY     Date of Procedure: 6/14/2023     Procedure:  Lap Parvin, Liver Biopsy  Surgeon(s) and Role:     * Jung Thomas MD - Primary    Assistant: BRADFORD Graff NP    Pre-Operative Diagnosis: Calculus of gallbladder without cholecystitis without obstruction [K80.20]  Liver disease [K76.9]    Post-Operative Diagnosis: Post-Op Diagnosis Codes:     * Calculus of gallbladder without cholecystitis without obstruction [K80.20]     * Liver disease [K76.9]    Anesthesia: General    Operative Findings (including complications, if any):  Cholelithiases Cirrhosis    Description of Technical Procedures: The patient was taken to the operating room. Patient was placed under general anesthesia. Abdomen was prepped and draped in the usual sterile fashion. An appropriate timeout was performed. Optical tipped trocar was used to access the peritoneal cavity. Pneumoperitoneum was instituted. Abdominal exploration revealed cirrhosis. . Gallbladder was noted and held in a cephalad direction. The infundibulum was noted and held in a lateral direction. The peritoneum overlying the infundibulum was dissected revealing the cystic duct gallbladder junction and the cystic artery. The critical view was obtained. The cystic duct and cystic artery were clipped and transected. The gallbladder was removed from the undersurface of the liver with sharp and electric dissection. Hemostasis was assured. The clips were in excellent position and there was no bleeding or leakage of bile. Gallbladder was completely removed from the liver hemostasis was assured. The gallbladder was removed from the abdomen. Sharp dissection with used to excise a portion for liver for biopsy. Once again hemostasis was assured the operative site was irrigated until clear. Trochars were removed and pneumoperitoneum released the incisions were closed with Vicryl.      Estimated Blood Loss  (EBL): * No values recorded between 6/14/2023  1:03 PM and 6/14/2023  1:30 PM *             Specimens:   Specimen (24h ago, onward)       Start     Ordered    06/14/23 1315  Specimen to Pathology  RELEASE UPON ORDERING        References:    Click here for ordering Quick Tip   Question:  Release to patient  Answer:  Immediate    06/14/23 1315                            Condition: Good    Disposition: PACU - hemodynamically stable.    Attestation: I was present and scrubbed for the entire procedure.      DISCHARGE NOTE    DISCHARGE DATE:  6/14/2023    PRINCIPAL DIAGNOSIS:  Cholelithiases    OUTCOME:  Satisfactory    DISPOSITION:  Home    FOLLOWUP:  In general surgery clinic

## 2023-06-14 NOTE — CARE UPDATE
Received patient from the OR, he is arousing upon pacu arrival,mackenzie,rejected oral airway, oriented/reassured him, c/o pain-will medicate per anesthesia orders, respirations full-regular-deep-clear,hob up 30 degrees.
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
## 2023-06-14 NOTE — CARE UPDATE
Updated Dr. Rodríguez on patients status, v/s, meds given, post-op CBG of 166 - no new orders given.

## 2023-06-14 NOTE — BRIEF OP NOTE
[facility] Surgical - Periop Services  Brief Operative Note    Surgery Date: 6/14/2023     Surgeon(s) and Role:     * Jung Thomas MD - Primary    Assist: BAILEY Graff NP     Pre-op Diagnosis:  Calculus of gallbladder without cholecystitis without obstruction [K80.20]  Liver disease [K76.9]    Post-op Diagnosis:  Post-Op Diagnosis Codes:     * Calculus of gallbladder without cholecystitis without obstruction [K80.20]     * Liver disease [K76.9]  Liver cirrhosis     Procedure: Laparoscopic cholecystectomy, Laparoscopic liver wedge biopsy    Anesthesia: General    Operative Findings: cirrhosis of liver, intra op photos taken.     Estimated Blood Loss: 10 cc         Specimens:   Specimen (24h ago, onward)       Start     Ordered    06/14/23 1315  Specimen to Pathology  RELEASE UPON ORDERING        References:    Click here for ordering Quick Tip   Question:  Release to patient  Answer:  Immediate    06/14/23 1315                      Discharge Note    OUTCOME: Patient tolerated treatment/procedure well without complication and is now ready for discharge.    DISPOSITION: Home or Self Care    FINAL DIAGNOSIS:  Cholelithiases    FOLLOWUP: In clinic    DISCHARGE INSTRUCTIONS:  DC instructions given to patient.

## 2023-06-14 NOTE — INTERVAL H&P NOTE
The patient has been examined and the H&P has been reviewed:    I concur with the findings and no changes have occurred since H&P was written.    Procedure risks, benefits and alternative options discussed and understood by patient/family.    Dr. Thomas examined patient, discussed recommendations, obtained informed consent, and answered all questions.     Lap pedro, possible Lap liver biopsy, possible EGD, and other indicated procedures.       Active Hospital Problems    Diagnosis  POA    *Cholelithiases [K80.20]  Yes      Resolved Hospital Problems   No resolved problems to display.

## 2023-06-14 NOTE — ANESTHESIA PROCEDURE NOTES
Intubation    Date/Time: 6/14/2023 12:39 PM  Performed by: Bhupendra Foreman CRNA  Authorized by: Fernando Rodríguez MD     Intubation:     Induction:  Intravenous    Intubated:  Postinduction    Mask Ventilation:  Easy mask    Attempts:  1    Attempted By:  CRNA    Method of Intubation:  Direct    Blade:  [place] 2    Laryngeal View Grade: Grade I - full view of cords      Difficult Airway Encountered?: No      Complications:  None    Airway Device Size:  7.5    Style/Cuff Inflation:  Cuffed    Tube secured:  21    Placement Verified By:  Capnometry    Complicating Factors:  None    Findings Post-Intubation:  BS equal bilateral

## 2023-06-14 NOTE — TRANSFER OF CARE
Anesthesia Transfer of Care Note    Patient: Con Arnold    Procedure(s) Performed: Procedure(s) (LRB):  CHOLECYSTECTOMY, LAPAROSCOPIC (N/A)  BIOPSY, LIVER, LAPAROSCOPIC wedge (N/A)  EGD (ESOPHAGOGASTRODUODENOSCOPY) (N/A)    Patient location: PACU    Anesthesia Type: general    Transport from OR: Transported from OR on room air with adequate spontaneous ventilation    Post pain: adequate analgesia    Post assessment: no apparent anesthetic complications    Post vital signs: stable    Level of consciousness: responds to stimulation    Nausea/Vomiting: no nausea/vomiting    Complications: none    Transfer of care protocol was followed      Last vitals:   Visit Vitals  /66   Pulse 66   Temp 36.7 °C (98.1 °F)   Resp 17   Ht 6' (1.829 m)   Wt 97.4 kg (214 lb 11.7 oz)   SpO2 97%   BMI 29.12 kg/m²

## 2023-06-14 NOTE — ANESTHESIA POSTPROCEDURE EVALUATION
Anesthesia Post Evaluation    Patient: Con Arnold    Procedure(s) Performed: Procedure(s) (LRB):  CHOLECYSTECTOMY, LAPAROSCOPIC (N/A)  BIOPSY, LIVER, LAPAROSCOPIC wedge (N/A)  EGD (ESOPHAGOGASTRODUODENOSCOPY) (N/A)    Final Anesthesia Type: general      Patient location during evaluation: PACU  Patient participation: Yes- Able to Participate  Level of consciousness: awake and alert and oriented  Post-procedure vital signs: reviewed and stable  Pain management: adequate  Airway patency: patent    PONV status at discharge: No PONV  Anesthetic complications: no      Cardiovascular status: hemodynamically stable  Respiratory status: unassisted  Hydration status: euvolemic  Follow-up not needed.          Vitals Value Taken Time   /65 06/14/23 1410   Temp 36.7 °C (98.1 °F) 06/14/23 1410   Pulse 80 06/14/23 1410   Resp 18 06/14/23 1410   SpO2 96 % 06/14/23 1410         No case tracking events are documented in the log.      Pain/Adalberto Score: Pain Rating Prior to Med Admin: 8 (6/14/2023  2:10 PM)  Pain Rating Post Med Admin: 1 (6/14/2023  2:10 PM)  Adalberto Score: 10 (6/14/2023  2:20 PM)

## 2023-06-15 VITALS
HEART RATE: 77 BPM | RESPIRATION RATE: 16 BRPM | HEIGHT: 72 IN | TEMPERATURE: 98 F | DIASTOLIC BLOOD PRESSURE: 78 MMHG | WEIGHT: 214.75 LBS | OXYGEN SATURATION: 93 % | BODY MASS INDEX: 29.09 KG/M2 | SYSTOLIC BLOOD PRESSURE: 127 MMHG

## 2023-06-19 LAB — PSYCHE PATHOLOGY RESULT: NORMAL

## 2023-09-07 ENCOUNTER — PATIENT MESSAGE (OUTPATIENT)
Dept: RESEARCH | Facility: HOSPITAL | Age: 63
End: 2023-09-07
Payer: MEDICARE

## 2023-09-11 ENCOUNTER — LAB VISIT (OUTPATIENT)
Dept: LAB | Facility: HOSPITAL | Age: 63
End: 2023-09-11
Attending: STUDENT IN AN ORGANIZED HEALTH CARE EDUCATION/TRAINING PROGRAM
Payer: MEDICARE

## 2023-09-11 DIAGNOSIS — Z11.59 SCREENING EXAMINATION FOR POLIOMYELITIS: ICD-10-CM

## 2023-09-11 DIAGNOSIS — Z72.89 OTHER PROBLEMS RELATED TO LIFESTYLE: ICD-10-CM

## 2023-09-11 DIAGNOSIS — K74.60 HEPATIC CIRRHOSIS, UNSPECIFIED HEPATIC CIRRHOSIS TYPE, UNSPECIFIED WHETHER ASCITES PRESENT: Primary | ICD-10-CM

## 2023-09-11 DIAGNOSIS — Z80.0 FH: COLON CANCER: ICD-10-CM

## 2023-09-11 DIAGNOSIS — I85.00 ESOPHAGEAL VARICES WITHOUT BLEEDING: ICD-10-CM

## 2023-09-11 LAB
AFP-TM SERPL-MCNC: 2.4 NG/ML
AMYLASE SERPL-CCNC: 48 UNIT/L (ref 25–125)
BASOPHILS # BLD AUTO: 0.08 X10(3)/MCL
BASOPHILS NFR BLD AUTO: 1.1 %
EOSINOPHIL # BLD AUTO: 0.16 X10(3)/MCL (ref 0–0.9)
EOSINOPHIL NFR BLD AUTO: 2.2 %
ERYTHROCYTE [DISTWIDTH] IN BLOOD BY AUTOMATED COUNT: 13.2 % (ref 11.5–17)
ERYTHROCYTE [SEDIMENTATION RATE] IN BLOOD: 15 MM/HR (ref 0–15)
FERRITIN SERPL-MCNC: 239.07 NG/ML (ref 21.81–274.66)
GGT SERPL-CCNC: 58 U/L (ref 12–64)
HCT VFR BLD AUTO: 51 % (ref 42–52)
HGB BLD-MCNC: 17.9 G/DL (ref 14–18)
IMM GRANULOCYTES # BLD AUTO: 0.03 X10(3)/MCL (ref 0–0.04)
IMM GRANULOCYTES NFR BLD AUTO: 0.4 %
INR PPP: 1
LIPASE SERPL-CCNC: 23 U/L
LYMPHOCYTES # BLD AUTO: 2.12 X10(3)/MCL (ref 0.6–4.6)
LYMPHOCYTES NFR BLD AUTO: 29.6 %
MCH RBC QN AUTO: 34 PG (ref 27–31)
MCHC RBC AUTO-ENTMCNC: 35.1 G/DL (ref 33–36)
MCV RBC AUTO: 97 FL (ref 80–94)
MONOCYTES # BLD AUTO: 0.54 X10(3)/MCL (ref 0.1–1.3)
MONOCYTES NFR BLD AUTO: 7.5 %
NEUTROPHILS # BLD AUTO: 4.23 X10(3)/MCL (ref 2.1–9.2)
NEUTROPHILS NFR BLD AUTO: 59.2 %
NRBC BLD AUTO-RTO: 0 %
PLATELET # BLD AUTO: 135 X10(3)/MCL (ref 130–400)
PMV BLD AUTO: 11.4 FL (ref 7.4–10.4)
PROTHROMBIN TIME: 13.5 SECONDS (ref 12.5–14.5)
RBC # BLD AUTO: 5.26 X10(6)/MCL (ref 4.7–6.1)
WBC # SPEC AUTO: 7.16 X10(3)/MCL (ref 4.5–11.5)

## 2023-09-11 PROCEDURE — 82105 ALPHA-FETOPROTEIN SERUM: CPT

## 2023-09-11 PROCEDURE — 86036 ANCA SCREEN EACH ANTIBODY: CPT

## 2023-09-11 PROCEDURE — 82150 ASSAY OF AMYLASE: CPT

## 2023-09-11 PROCEDURE — 36415 COLL VENOUS BLD VENIPUNCTURE: CPT

## 2023-09-11 PROCEDURE — 82728 ASSAY OF FERRITIN: CPT

## 2023-09-11 PROCEDURE — 85652 RBC SED RATE AUTOMATED: CPT

## 2023-09-11 PROCEDURE — 82390 ASSAY OF CERULOPLASMIN: CPT

## 2023-09-11 PROCEDURE — 87340 HEPATITIS B SURFACE AG IA: CPT

## 2023-09-11 PROCEDURE — 86708 HEPATITIS A ANTIBODY: CPT

## 2023-09-11 PROCEDURE — 82104 ALPHA-1-ANTITRYPSIN PHENO: CPT

## 2023-09-11 PROCEDURE — 82977 ASSAY OF GGT: CPT

## 2023-09-11 PROCEDURE — 86376 MICROSOMAL ANTIBODY EACH: CPT

## 2023-09-11 PROCEDURE — 86381 MITOCHONDRIAL ANTIBODY EACH: CPT

## 2023-09-11 PROCEDURE — 86803 HEPATITIS C AB TEST: CPT

## 2023-09-11 PROCEDURE — 86704 HEP B CORE ANTIBODY TOTAL: CPT

## 2023-09-11 PROCEDURE — 86706 HEP B SURFACE ANTIBODY: CPT

## 2023-09-11 PROCEDURE — 85610 PROTHROMBIN TIME: CPT

## 2023-09-11 PROCEDURE — 86015 ACTIN ANTIBODY EACH: CPT

## 2023-09-11 PROCEDURE — 86709 HEPATITIS A IGM ANTIBODY: CPT

## 2023-09-11 PROCEDURE — 82103 ALPHA-1-ANTITRYPSIN TOTAL: CPT

## 2023-09-11 PROCEDURE — 83690 ASSAY OF LIPASE: CPT

## 2023-09-11 PROCEDURE — 86039 ANTINUCLEAR ANTIBODIES (ANA): CPT

## 2023-09-11 PROCEDURE — 85025 COMPLETE CBC W/AUTO DIFF WBC: CPT

## 2023-09-12 ENCOUNTER — LAB REQUISITION (OUTPATIENT)
Dept: LAB | Facility: HOSPITAL | Age: 63
End: 2023-09-12
Payer: MEDICARE

## 2023-09-12 DIAGNOSIS — I85.00 ESOPHAGEAL VARICES WITHOUT BLEEDING: ICD-10-CM

## 2023-09-12 DIAGNOSIS — K74.60 UNSPECIFIED CIRRHOSIS OF LIVER: ICD-10-CM

## 2023-09-12 LAB
ALBUMIN SERPL-MCNC: 4 G/DL (ref 3.4–4.8)
ALBUMIN/GLOB SERPL: 1.1 RATIO (ref 1.1–2)
ALP SERPL-CCNC: 91 UNIT/L (ref 40–150)
ALT SERPL-CCNC: 18 UNIT/L (ref 0–55)
ANA PAT SER IF-IMP: ABNORMAL
ANA SER QL HEP2 SUBST: ABNORMAL
ANA TITR SER HEP2 SUBST: ABNORMAL {TITER}
AST SERPL-CCNC: 27 UNIT/L (ref 5–34)
BILIRUB SERPL-MCNC: 1.1 MG/DL
BUN SERPL-MCNC: 18.9 MG/DL (ref 8.4–25.7)
CALCIUM SERPL-MCNC: 9.7 MG/DL (ref 8.8–10)
CHLORIDE SERPL-SCNC: 97 MMOL/L (ref 98–107)
CHOLEST SERPL-MCNC: 245 MG/DL
CHOLEST/HDLC SERPL: 6 {RATIO} (ref 0–5)
CO2 SERPL-SCNC: 25 MMOL/L (ref 23–31)
CREAT SERPL-MCNC: 1.23 MG/DL (ref 0.73–1.18)
GFR SERPLBLD CREATININE-BSD FMLA CKD-EPI: >60 MLS/MIN/1.73/M2
GLOBULIN SER-MCNC: 3.6 GM/DL (ref 2.4–3.5)
GLUCOSE SERPL-MCNC: 377 MG/DL (ref 82–115)
HAV AB SER QL IA: NONREACTIVE
HAV IGM SERPL QL IA: NONREACTIVE
HBV CORE AB SERPL QL IA: NONREACTIVE
HBV SURFACE AB SER-ACNC: 0.6 MIU/ML
HBV SURFACE AB SERPL IA-ACNC: NONREACTIVE M[IU]/ML
HBV SURFACE AG SERPL QL IA: NONREACTIVE
HCV AB SERPL QL IA: NONREACTIVE
HDLC SERPL-MCNC: 44 MG/DL (ref 35–60)
LDLC SERPL CALC-MCNC: 168 MG/DL (ref 50–140)
LKM-1 AB SER-ACNC: <5 U
POTASSIUM SERPL-SCNC: 4.5 MMOL/L (ref 3.5–5.1)
PROT SERPL-MCNC: 7.6 GM/DL (ref 5.8–7.6)
SODIUM SERPL-SCNC: 137 MMOL/L (ref 136–145)
TRIGL SERPL-MCNC: 165 MG/DL (ref 34–140)
VLDLC SERPL CALC-MCNC: 33 MG/DL

## 2023-09-12 PROCEDURE — 80061 LIPID PANEL: CPT | Performed by: STUDENT IN AN ORGANIZED HEALTH CARE EDUCATION/TRAINING PROGRAM

## 2023-09-12 PROCEDURE — 80053 COMPREHEN METABOLIC PANEL: CPT | Performed by: STUDENT IN AN ORGANIZED HEALTH CARE EDUCATION/TRAINING PROGRAM

## 2023-09-13 LAB
A1AT PHENOTYP SERPL-IMP: NORMAL BANDS
A1AT SERPL NEPH-MCNC: 160 MG/DL (ref 100–190)
C-ANCA TITR SER IF: NEGATIVE {TITER}
CERULOPLASMIN SERPL-MCNC: 26.9 MG/DL (ref 19–31)
MITOCHONDRIA M2 AB SER IA-ACNC: <0.1 U
P-ANCA SER QL IF: NEGATIVE
SMOOTH MUSCLE AB SER QL IF: NEGATIVE

## 2023-09-20 ENCOUNTER — LAB VISIT (OUTPATIENT)
Dept: LAB | Facility: HOSPITAL | Age: 63
End: 2023-09-20
Attending: STUDENT IN AN ORGANIZED HEALTH CARE EDUCATION/TRAINING PROGRAM
Payer: MEDICARE

## 2023-09-20 DIAGNOSIS — Z80.0 FAMILY HISTORY OF COLON CANCER: ICD-10-CM

## 2023-09-20 DIAGNOSIS — K74.60 HEPATIC CIRRHOSIS, UNSPECIFIED HEPATIC CIRRHOSIS TYPE, UNSPECIFIED WHETHER ASCITES PRESENT: Primary | ICD-10-CM

## 2023-09-20 DIAGNOSIS — I85.00 ESOPHAGEAL VARICES: ICD-10-CM

## 2023-09-20 LAB
ALBUMIN SERPL-MCNC: 3.6 G/DL (ref 3.4–4.8)
ALBUMIN/GLOB SERPL: 1.2 RATIO (ref 1.1–2)
ALP SERPL-CCNC: 73 UNIT/L (ref 40–150)
ALT SERPL-CCNC: 21 UNIT/L (ref 0–55)
AST SERPL-CCNC: 22 UNIT/L (ref 5–34)
BILIRUB SERPL-MCNC: 1 MG/DL
BUN SERPL-MCNC: 18.9 MG/DL (ref 8.4–25.7)
CALCIUM SERPL-MCNC: 8.9 MG/DL (ref 8.8–10)
CHLORIDE SERPL-SCNC: 98 MMOL/L (ref 98–107)
CHOLEST SERPL-MCNC: 252 MG/DL
CHOLEST/HDLC SERPL: 6 {RATIO} (ref 0–5)
CO2 SERPL-SCNC: 30 MMOL/L (ref 23–31)
CREAT SERPL-MCNC: 1.05 MG/DL (ref 0.73–1.18)
GFR SERPLBLD CREATININE-BSD FMLA CKD-EPI: >60 MLS/MIN/1.73/M2
GLOBULIN SER-MCNC: 3.1 GM/DL (ref 2.4–3.5)
GLUCOSE SERPL-MCNC: 298 MG/DL (ref 82–115)
HDLC SERPL-MCNC: 40 MG/DL (ref 35–60)
LDLC SERPL CALC-MCNC: 175 MG/DL (ref 50–140)
POTASSIUM SERPL-SCNC: 4 MMOL/L (ref 3.5–5.1)
PROT SERPL-MCNC: 6.7 GM/DL (ref 5.8–7.6)
SODIUM SERPL-SCNC: 138 MMOL/L (ref 136–145)
TRIGL SERPL-MCNC: 186 MG/DL (ref 34–140)
VLDLC SERPL CALC-MCNC: 37 MG/DL

## 2023-09-20 PROCEDURE — 80061 LIPID PANEL: CPT

## 2023-09-20 PROCEDURE — 80053 COMPREHEN METABOLIC PANEL: CPT

## 2023-09-20 PROCEDURE — 36415 COLL VENOUS BLD VENIPUNCTURE: CPT

## 2023-11-02 ENCOUNTER — HOSPITAL ENCOUNTER (INPATIENT)
Facility: HOSPITAL | Age: 63
LOS: 2 days | Discharge: PSYCHIATRIC OR SUBSTANCE USE FACILITY | DRG: 885 | End: 2023-11-04
Attending: EMERGENCY MEDICINE | Admitting: INTERNAL MEDICINE
Payer: MEDICARE

## 2023-11-02 DIAGNOSIS — E11.65 UNCONTROLLED TYPE 2 DIABETES MELLITUS WITH HYPERGLYCEMIA: ICD-10-CM

## 2023-11-02 DIAGNOSIS — R29.818 ACUTE FOCAL NEUROLOGICAL DEFICIT: ICD-10-CM

## 2023-11-02 DIAGNOSIS — E11.621 TYPE 2 DIABETES MELLITUS WITH LEFT DIABETIC FOOT ULCER: Primary | ICD-10-CM

## 2023-11-02 DIAGNOSIS — F31.60 BIPOLAR AFFECTIVE DISORDER, CURRENT EPISODE MIXED, CURRENT EPISODE SEVERITY UNSPECIFIED: ICD-10-CM

## 2023-11-02 DIAGNOSIS — I63.9 CVA (CEREBRAL VASCULAR ACCIDENT): ICD-10-CM

## 2023-11-02 DIAGNOSIS — L97.529 TYPE 2 DIABETES MELLITUS WITH LEFT DIABETIC FOOT ULCER: Primary | ICD-10-CM

## 2023-11-02 PROBLEM — R53.1 RIGHT SIDED WEAKNESS: Status: ACTIVE | Noted: 2023-11-02

## 2023-11-02 LAB
ALBUMIN SERPL-MCNC: 3.3 G/DL (ref 3.4–4.8)
ALBUMIN/GLOB SERPL: 1.1 RATIO (ref 1.1–2)
ALP SERPL-CCNC: 93 UNIT/L (ref 40–150)
ALT SERPL-CCNC: 30 UNIT/L (ref 0–55)
AST SERPL-CCNC: 27 UNIT/L (ref 5–34)
BASOPHILS # BLD AUTO: 0.08 X10(3)/MCL
BASOPHILS NFR BLD AUTO: 1.4 %
BILIRUB SERPL-MCNC: 1.1 MG/DL
BUN SERPL-MCNC: 11 MG/DL (ref 8.4–25.7)
CALCIUM SERPL-MCNC: 9.3 MG/DL (ref 8.8–10)
CHLORIDE SERPL-SCNC: 98 MMOL/L (ref 98–107)
CHOLEST SERPL-MCNC: 287 MG/DL
CHOLEST/HDLC SERPL: 7 {RATIO} (ref 0–5)
CO2 SERPL-SCNC: 30 MMOL/L (ref 23–31)
CREAT SERPL-MCNC: 1.09 MG/DL (ref 0.73–1.18)
CRP SERPL-MCNC: 4.1 MG/L
EOSINOPHIL # BLD AUTO: 0.19 X10(3)/MCL (ref 0–0.9)
EOSINOPHIL NFR BLD AUTO: 3.3 %
ERYTHROCYTE [DISTWIDTH] IN BLOOD BY AUTOMATED COUNT: 12.9 % (ref 11.5–17)
ERYTHROCYTE [SEDIMENTATION RATE] IN BLOOD: 20 MM/HR (ref 0–15)
GFR SERPLBLD CREATININE-BSD FMLA CKD-EPI: >60 MLS/MIN/1.73/M2
GLOBULIN SER-MCNC: 3.1 GM/DL (ref 2.4–3.5)
GLUCOSE SERPL-MCNC: 358 MG/DL (ref 82–115)
HCT VFR BLD AUTO: 45.2 % (ref 42–52)
HDLC SERPL-MCNC: 43 MG/DL (ref 35–60)
HGB BLD-MCNC: 16.4 G/DL (ref 14–18)
IMM GRANULOCYTES # BLD AUTO: 0.02 X10(3)/MCL (ref 0–0.04)
IMM GRANULOCYTES NFR BLD AUTO: 0.4 %
INR PPP: 1.1
LDLC SERPL CALC-MCNC: 202 MG/DL (ref 50–140)
LYMPHOCYTES # BLD AUTO: 1.81 X10(3)/MCL (ref 0.6–4.6)
LYMPHOCYTES NFR BLD AUTO: 31.8 %
MCH RBC QN AUTO: 34.3 PG (ref 27–31)
MCHC RBC AUTO-ENTMCNC: 36.3 G/DL (ref 33–36)
MCV RBC AUTO: 94.6 FL (ref 80–94)
MONOCYTES # BLD AUTO: 0.54 X10(3)/MCL (ref 0.1–1.3)
MONOCYTES NFR BLD AUTO: 9.5 %
NEUTROPHILS # BLD AUTO: 3.05 X10(3)/MCL (ref 2.1–9.2)
NEUTROPHILS NFR BLD AUTO: 53.6 %
NRBC BLD AUTO-RTO: 0 %
PLATELET # BLD AUTO: 100 X10(3)/MCL (ref 130–400)
PMV BLD AUTO: 12.1 FL (ref 7.4–10.4)
POCT GLUCOSE: 234 MG/DL (ref 70–110)
POTASSIUM SERPL-SCNC: 4.1 MMOL/L (ref 3.5–5.1)
PROT SERPL-MCNC: 6.4 GM/DL (ref 5.8–7.6)
PROTHROMBIN TIME: 13.6 SECONDS (ref 12.5–14.5)
RBC # BLD AUTO: 4.78 X10(6)/MCL (ref 4.7–6.1)
SODIUM SERPL-SCNC: 134 MMOL/L (ref 136–145)
TRIGL SERPL-MCNC: 212 MG/DL (ref 34–140)
TSH SERPL-ACNC: 1.29 UIU/ML (ref 0.35–4.94)
VLDLC SERPL CALC-MCNC: 42 MG/DL
WBC # SPEC AUTO: 5.69 X10(3)/MCL (ref 4.5–11.5)

## 2023-11-02 PROCEDURE — 82962 GLUCOSE BLOOD TEST: CPT

## 2023-11-02 PROCEDURE — 85610 PROTHROMBIN TIME: CPT | Performed by: EMERGENCY MEDICINE

## 2023-11-02 PROCEDURE — 63600175 PHARM REV CODE 636 W HCPCS: Performed by: EMERGENCY MEDICINE

## 2023-11-02 PROCEDURE — 85025 COMPLETE CBC W/AUTO DIFF WBC: CPT | Performed by: EMERGENCY MEDICINE

## 2023-11-02 PROCEDURE — 11000001 HC ACUTE MED/SURG PRIVATE ROOM

## 2023-11-02 PROCEDURE — 85652 RBC SED RATE AUTOMATED: CPT | Performed by: PHYSICIAN ASSISTANT

## 2023-11-02 PROCEDURE — 80061 LIPID PANEL: CPT | Performed by: EMERGENCY MEDICINE

## 2023-11-02 PROCEDURE — 99285 EMERGENCY DEPT VISIT HI MDM: CPT | Mod: 25

## 2023-11-02 PROCEDURE — 84443 ASSAY THYROID STIM HORMONE: CPT | Performed by: EMERGENCY MEDICINE

## 2023-11-02 PROCEDURE — 96372 THER/PROPH/DIAG INJ SC/IM: CPT | Performed by: EMERGENCY MEDICINE

## 2023-11-02 PROCEDURE — 99223 1ST HOSP IP/OBS HIGH 75: CPT | Mod: ,,, | Performed by: PSYCHIATRY & NEUROLOGY

## 2023-11-02 PROCEDURE — 86140 C-REACTIVE PROTEIN: CPT | Performed by: PHYSICIAN ASSISTANT

## 2023-11-02 PROCEDURE — 99223 PR INITIAL HOSPITAL CARE,LEVL III: ICD-10-PCS | Mod: ,,, | Performed by: PSYCHIATRY & NEUROLOGY

## 2023-11-02 PROCEDURE — 21400001 HC TELEMETRY ROOM

## 2023-11-02 PROCEDURE — 80053 COMPREHEN METABOLIC PANEL: CPT | Performed by: EMERGENCY MEDICINE

## 2023-11-02 PROCEDURE — 25000003 PHARM REV CODE 250: Performed by: INTERNAL MEDICINE

## 2023-11-02 PROCEDURE — 25500020 PHARM REV CODE 255: Performed by: EMERGENCY MEDICINE

## 2023-11-02 PROCEDURE — 96374 THER/PROPH/DIAG INJ IV PUSH: CPT | Mod: 59

## 2023-11-02 PROCEDURE — 25000003 PHARM REV CODE 250: Performed by: EMERGENCY MEDICINE

## 2023-11-02 RX ORDER — ASPIRIN 325 MG
325 TABLET, DELAYED RELEASE (ENTERIC COATED) ORAL
Status: COMPLETED | OUTPATIENT
Start: 2023-11-02 | End: 2023-11-02

## 2023-11-02 RX ORDER — INSULIN ASPART 100 [IU]/ML
0-10 INJECTION, SOLUTION INTRAVENOUS; SUBCUTANEOUS EVERY 6 HOURS PRN
Status: DISCONTINUED | OUTPATIENT
Start: 2023-11-02 | End: 2023-11-04 | Stop reason: HOSPADM

## 2023-11-02 RX ORDER — ASPIRIN 81 MG/1
81 TABLET ORAL DAILY
Status: DISCONTINUED | OUTPATIENT
Start: 2023-11-03 | End: 2023-11-03

## 2023-11-02 RX ORDER — HYDRALAZINE HYDROCHLORIDE 20 MG/ML
10 INJECTION INTRAMUSCULAR; INTRAVENOUS EVERY 6 HOURS PRN
Status: DISCONTINUED | OUTPATIENT
Start: 2023-11-02 | End: 2023-11-04 | Stop reason: HOSPADM

## 2023-11-02 RX ORDER — GLUCAGON 1 MG
1 KIT INJECTION
Status: DISCONTINUED | OUTPATIENT
Start: 2023-11-02 | End: 2023-11-04 | Stop reason: HOSPADM

## 2023-11-02 RX ORDER — ZIPRASIDONE MESYLATE 20 MG/ML
10 INJECTION, POWDER, LYOPHILIZED, FOR SOLUTION INTRAMUSCULAR EVERY 6 HOURS PRN
Status: DISCONTINUED | OUTPATIENT
Start: 2023-11-02 | End: 2023-11-04 | Stop reason: HOSPADM

## 2023-11-02 RX ORDER — WATER FOR INJECTION,STERILE
VIAL (ML) INJECTION
Status: DISPENSED
Start: 2023-11-02 | End: 2023-11-03

## 2023-11-02 RX ORDER — ZIPRASIDONE MESYLATE 20 MG/ML
20 INJECTION, POWDER, LYOPHILIZED, FOR SOLUTION INTRAMUSCULAR
Status: COMPLETED | OUTPATIENT
Start: 2023-11-02 | End: 2023-11-02

## 2023-11-02 RX ORDER — SODIUM CHLORIDE 9 MG/ML
INJECTION, SOLUTION INTRAVENOUS CONTINUOUS
Status: ACTIVE | OUTPATIENT
Start: 2023-11-02 | End: 2023-11-03

## 2023-11-02 RX ADMIN — INSULIN HUMAN 6 UNITS: 100 INJECTION, SOLUTION PARENTERAL at 02:11

## 2023-11-02 RX ADMIN — ZIPRASIDONE MESYLATE 20 MG: 20 INJECTION, POWDER, LYOPHILIZED, FOR SOLUTION INTRAMUSCULAR at 01:11

## 2023-11-02 RX ADMIN — ASPIRIN 325 MG: 325 TABLET, COATED ORAL at 02:11

## 2023-11-02 RX ADMIN — IOPAMIDOL 100 ML: 755 INJECTION, SOLUTION INTRAVENOUS at 12:11

## 2023-11-02 RX ADMIN — SODIUM CHLORIDE: 9 INJECTION, SOLUTION INTRAVENOUS at 09:11

## 2023-11-02 NOTE — H&P
[facility] Hospital Medicine History & Physical Examination       Patient Name: Con Arnold  MRN: [id number]  Patient Class: IP- Inpatient   Admission Date: 11/2/2023   Admitting Physician: Esme Gates MD  Length of Stay: 0  Attending Physician: Esme Gates MD  [facility]  Face-to-Face encounter date: 11/02/2023  Code Status: Full code   Chief Complaint: Numbness (AASI from home- medic reports pt last normal at 2300 last night with waking at approx 0945 and unable to transfer self into wheelchair (left leg BKA) and fell onto ground. Pt reports he was unable to get up and called EMS. EMS reports [place] negative en route with a possible very mild right arm weakness. No deficits with assessment- further documentation in notes. )        Patient information was obtained from patient, patient's family, past medical records and ER records.     HISTORY OF PRESENT ILLNESS:   Con Arnold is a 63 y.o. male with a past medical history of essential hypertension, hyperlipidemia, TBI, diabetes mellitus type 2, COPD, neuropathy, cataracts, glaucoma, and neuropathy presented to [facility] on 11/2/2023 for weakness.  Patient reported right-sided numbness and weakness began when he woke up this morning.  Patient stated he fell while trying to transfer from his bed to his chair. Patient's last known normal was at 11:00 p.m. last night, 11/01/2023.  In ED patient was uncooperative with examination.  Patient began using explicit language and kicking/swinging at nursing staff.  Patient was placed under PEC in ED.   Initial vital signs in ED were /78, pulse 67, respirations 16, temperature 36.9° C, and SpO2 100% on room air.  Labs revealed WBC 5.69, platelets 100, sodium 134, and glucose 358.  CTA head and neck revealed no evidence of hemorrhage or other acute intracranial process on the precontrast images of the head, no acute or  focal abnormality of the intracranial vasculature, and no flow-limiting stenosis or other significant abnormality involving the carotid and vertebral arteries.  Patient was given aspirin 325 mg, IM Zyprexa down 20 mg, and IV insulin 6 units in ED. Patient was admitted to hospital medicine service for further medical management.     PAST MEDICAL HISTORY:   Essential hypertension  Hyperlipidemia  TBI  Diabetes mellitus type 2   COPD  Neuropathy  Cataracts   Glaucoma   Neuropathy    PAST SURGICAL HISTORY:     Past Surgical History:   Procedure Laterality Date    APPENDECTOMY      BELOW KNEE AMPUTATION OF LOWER EXTREMITY Left 12/21/2022    Procedure: AMPUTATION, BELOW KNEE;  Surgeon: William Aguayo MD;  Location: [facility];  Service: General;  Laterality: Left;    CARPAL TUNNEL RELEASE Bilateral     CATARACT EXTRACTION Left     ESOPHAGOGASTRODUODENOSCOPY N/A 6/14/2023    Procedure: EGD (ESOPHAGOGASTRODUODENOSCOPY);  Surgeon: Jung Thomas MD;  Location: [facility];  Service: General;  Laterality: N/A;    FEMORAL ARTERY STENT Left     FOOT SURGERY      LAPAROSCOPIC BIOPSY OF LIVER N/A 6/14/2023    Procedure: BIOPSY, LIVER, LAPAROSCOPIC wedge;  Surgeon: Jung Thomas MD;  Location: [facility];  Service: General;  Laterality: N/A;    LAPAROSCOPIC CHOLECYSTECTOMY N/A 6/14/2023    Procedure: CHOLECYSTECTOMY, LAPAROSCOPIC;  Surgeon: Jung Thomas MD;  Location: [facility];  Service: General;  Laterality: N/A;    removal of rt 2nd toe Right        ALLERGIES:   Sulfa (sulfonamide antibiotics)    FAMILY HISTORY:   Reviewed and negative    SOCIAL HISTORY:   Denies tobacco, drug, and alcohol use    HOME MEDICATIONS:     Prior to Admission medications    Medication Sig Start Date End Date Taking? Authorizing Provider   albuterol (PROVENTIL/VENTOLIN HFA) 90 mcg/actuation inhaler Ventolin HFA 90 mcg/actuation aerosol inhaler    Provider, Historical   aspirin (ECOTRIN) 81 MG EC tablet 1 tablet Orally Once a day    Provider,  "Historical   atorvastatin (LIPITOR) 40 MG tablet Take 1 tablet (40 mg total) by mouth once daily. 12/23/22 12/23/23  Con Anaya MD   blood sugar diagnostic Strp Accu-Chek Rianna Plus test strips    Provider, Historical   DEPAKOTE  mg Tb24 Take 1 tablet (500 mg total) by mouth once daily. 11/11/22 11/11/23  Damion Arauz MD   DULoxetine (CYMBALTA) 30 MG capsule Take 1 capsule (30 mg total) by mouth 2 (two) times daily. 12/27/22 12/27/23  Con Anaya MD   fluticasone-salmeterol diskus inhaler 250-50 mcg 1 puff.    Provider, Historical   folic acid (FOLVITE) 1 MG tablet  7/11/22   Provider, Historical   gabapentin (NEURONTIN) 300 MG capsule Take 300 mg by mouth 3 (three) times daily. 3/11/22   Provider, Historical   glimepiride (AMARYL) 2 MG tablet  7/11/22   Provider, Historical   insulin glargine (LANTUS) 100 unit/mL injection inject 35 units in the morning and 20 units at bedtime subcutaneously DISCARD AFTER 28 DAYS 2/1/23   Provider, Historical   lisinopriL 10 MG tablet Take 1 tablet (10 mg total) by mouth once daily. 11/11/22 11/11/23  Damion Arauz MD   multivitamin capsule Take 1 capsule by mouth once daily.    Provider, Historical   pen needle, diabetic 31 gauge x 5/16" Ndle BD Ultra-Fine Short Pen Needle 31 gauge x 5/16"    Provider, Historical   spironolactone (ALDACTONE) 25 MG tablet Take 25 mg by mouth. 5/5/23   Provider, Historical   SURE COMFORT INSULIN SYRINGE 0.5 mL 31 gauge x 5/16" Syrg USE TO INJECT UNDER THE SKIN LANTUS TWICE DAILY AS DIRECTED 1/11/23   Provider, Historical   TRUE METRIX GLUCOSE METER Misc TEST BLOOD SUGAR FOUR TIMES DAILY 1/19/23   Provider, Historical   TRUEPLUS LANCETS 28 gauge Misc TEST BLOOD SUGAR FOUR TIMES DAILY 1/19/23   Provider, Historical       REVIEW OF SYSTEMS:   Except as documented, all other systems reviewed and negative     PHYSICAL EXAM:     VITAL SIGNS: 24 HRS MIN & MAX LAST   Temp  Min: 98.4 °F (36.9 °C)  " Max: 98.4 °F (36.9 °C) 98.4 °F (36.9 °C)   BP  Min: 98/54  Max: 156/99 102/60   Pulse  Min: 56  Max: 69  69   Resp  Min: 14  Max: 22 16   SpO2  Min: 95 %  Max: 100 % 95 %       General appearance: Male in no apparent distress.  HEENNT: Atraumatic head.   Lungs: Clear to auscultation bilaterally.   Heart: Regular rate and rhythm.    Abdomen: Soft, non-distended, non-tender. Bowel sounds are normal.   Extremities:  Left BKA  Skin: No Rash. Warm and dry.   Neuro:  Patient drowsy on exam-was given Zyprexa recently.  4/5 strength in right upper and lower extremities.  5/5 strength in left upper extremity. Follows commands    LABS AND IMAGING:     Recent Labs   Lab 11/02/23  1105   WBC 5.69   RBC 4.78   HGB 16.4   HCT 45.2   MCV 94.6*   MCH 34.3*   MCHC 36.3*   RDW 12.9   *   MPV 12.1*       Recent Labs   Lab 11/02/23  1105   *   K 4.1   CO2 30   BUN 11.0   CREATININE 1.09   CALCIUM 9.3   ALBUMIN 3.3*   ALKPHOS 93   ALT 30   AST 27   BILITOT 1.1       Microbiology Results (last 7 days)       ** No results found for the last 168 hours. **             CTA Head and Neck (xpd)  EXAMINATION  CTA HEAD AND NECK (XPD)    CLINICAL HISTORY  Neuro deficit, acute, stroke suspected;    TECHNIQUE  Pre- and post-contrast helical-acquisition CT images were obtained, imaging timed to coincide with arterial opacification for purposes of CT angiography.  Multiplanar reconstructions, to include MIP and volume-rendered (3D) images, were accomplished by a CT technologist at a separate workstation, pushed to PACS for physician review.    Evaluation of any visualized ICA stenosis was performed using NASCET criteria.    CONTRAST  IV: ISOVUE-370, 100 mL    COMPARISON  *No prior CTA is available at the time of initial interpretation.  *Non-contrast head CT obtained 7 October 2021 was reviewed.    FINDINGS  Images were reviewed in soft tissue, brain, subdural, and bone windows.    Exam quality: adequate for evaluation    Non-contrast  Head: No significant change from the above-referenced non-contrast head CT comparison.    Intracranial Vasculature: The included intracranial ICA segments are without evidence of flow limiting stenosis or other focal abnormality.  Unremarkable appearance of the CoW, to include the bilateral CAMMIE A1 and PCA P1 segments.  Patency of the anterior and bilateral posterior communicating arteries grossly maintained.  There is no evidence of focal contour irregularity, aneurysmal dilatation, or angiographic cut off involving the bilateral CAMMIE and MCA vessels.  Unremarkable course, caliber, and intraluminal contrast opacification appreciated throughout the bilateral PCA vessels, as well as the basilar artery.  The intracranial right vertebral artery is uniformly hypoplastic, likely congenital; additionally, the vessel terminates at the posteroinferior cerebellar circulation.  The left vertebral artery is unremarkable as visualized within the posterior fossa.  Normal contour and intraluminal contrast opacification of the dural sinuses.    Extracranial Vasculature: The bilateral common carotid vessels are unremarkable. Mild burden of bilateral carotid bifurcation and proximal ICA mural calcification noted, with no resulting luminal stenosis.  No suspicious findings of the bilateral ECA branches. Co-dominant vertebral artery system is present, with both vessels well opacified throughout their extracranial course and no focal narrowing or intraluminal abnormality. Visualized aortic arch is without focal contour irregularity, aneurysmal dilatation, or intraluminal abnormality. Normal 3-branch arch configuration is present.    Nonvascular: The included nonvascular structures are without convincing evidence of acute or suspicious focal abnormality. Aerodigestive structures are widely patent. No acute or destructive osseous process is appreciated.    IMPRESSION  1. No evidence of hemorrhage or other acute intracranial process on the  pre-contrast images of the head.  2. No acute or focal abnormality of the intracranial vasculature.  3. No flow limiting stenosis or other significant abnormality involving the carotid and vertebral arteries.    RADIATION DOSE  Automated tube current modulation, weight-based exposure dosing, and/or iterative reconstruction technique utilized to reach lowest reasonably achievable exposure rate.    DLP: 2699 mGy*cm    Electronically signed by: Baljeet Pang  Date:    11/02/2023  Time:    14:07        ASSESSMENT & PLAN:   Assessment:  CVA rule out   Aggressive behavior  History of essential hypertension, hyperlipidemia, TBI, diabetes mellitus type 2, COPD, neuropathy, cataracts, glaucoma, and neuropathy    Plan:  Neurology consulted, appreciate recommendations   MRI brain   Echo   Bilateral carotid artery ultrasound   Lipid panel   Hemoglobin A1c   ESR   CRP   Urine drug screen   Physical, occupational, and speech therapy consulted   Allow for permissive hypertension,  hydralazine PRN for SBP greater than 220 and/or DBP greater than 100  Neuro checks q.4 hours   Fall precautions   Patient currently under PEC  Psych consulted, appreciate recommendations   PRN IM Ziprasidone 10 mg q 6  Insulin sliding scale with Accu-Checks  Continue appropriate home medications once med rec updated   Labs in a.m.    VTE Prophylaxis: SCDs    ________________________________________________  INPATIENT LIST OF MEDICATIONS     Scheduled Meds:   sterile water for injection         Continuous Infusions:  PRN Meds:.sterile water for injection      Adelso LESTER PA-C, have reviewed and discussed the case with Dr. Melani Gates MD  Please see the following addendum for further assessment and plan from there attending MD.    11/02/2023    ________________________________________________________________________________    MD Addendum:  melani LESTER  assumed care of this patient today   For the patient encounter, I performed the  substantive portion of the visit, I reviewed the PA documentation, treatment plan, and medical decision making.  I had face to face time with this patient       63-year-old male with past medical history of hypertension, TBI, hyperlipidemia, diabetes to,, glaucoma was brought into the ED with complaints of right-sided weakness that was noted after he woke up while he was transferring from his bed to his wheelchair.  Patient was very uncooperative with the physical exam with the nursing staff and using some explicit language and was kicking and swinging so patient was placed in pec was given Zyprexa CTA of the head was negative for any acute or focal abnormality as such no flow-limiting stenosis.  Blood sugar was found to be elevated so patient was given IV insulin.  He has been admitted to hospitalist service for stroke workup he was PEC'd in the ER  General awake alert  Chest clear to auscultate     Continue with stroke protocol   MRI,  ordered, Neurology consulted   PT OT ST consulted   Pec status  Zyprexa was given for agitation  NPO FOR NOW   Will start on IV fluids normal saline at 75 cc an hour     Prophylaxis: SCD        Discharge Planning and Disposition: No mobility needs. Ambulating well. Good social support system.   Anticipated discharge    All diagnosis and differential diagnosis have been reviewed; assessment and plan has been documented; I have personally reviewed the labs and test results that are presently available; I have reviewed the patients medication list; I have reviewed the consulting providers response and recommendations. I have reviewed or attempted to review medical records based upon their availability.    All of the patient and family questions have been addressed and answered. Patient's is agreeable to the above stated plan. I will continue to monitor closely and make adjustments to medical management as needed.      11/02/2023

## 2023-11-02 NOTE — NURSING
Nurses Note -- 4 Eyes      11/2/2023   5:15 PM      Skin assessed during: Transfer      [x] No Altered Skin Integrity Present    []Prevention Measures Documented      [] Yes- Altered Skin Integrity Present or Discovered   [] LDA Added if Not in Epic (Describe Wound)   [] New Altered Skin Integrity was Present on Admit and Documented in LDA   [] Wound Image Taken    Wound Care Consulted? No    Attending Nurse:  Gardenia Wade RN     Second RN/Staff Member:  Don Valentin RN

## 2023-11-02 NOTE — ASSESSMENT & PLAN NOTE
Acute right sided weakness - r/o stroke    Plan:   Admit for stroke workup  Allow permissive HTN ... prn hydralazine and labetalol for SBP > 220 or DBP > 120     - after 24 hours from symptom onset, ok to normalize blood pressure  Neuro checks q4hr ... stat CTh if any neuro change   Begin ASA 325mg daily .... if failed [place], then ASA 300mg [place] daily  DVT ppx with SCD or lovenox 40 s/c daily  Continuous telemetry monitoring  Bedrest and HOB flat for 24 hours  NPO until passes [place] or cleared by SLP  PT/OT/SLP to evaluate after 24 hour bedrest completed (from symptom onset)    Further recommendations to follow.
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
## 2023-11-02 NOTE — HPI
Con Arnold is a 63 y.o. male with a past medical history of essential hypertension, hyperlipidemia, TBI, diabetes mellitus type 2, COPD, neuropathy, cataracts, glaucoma, and neuropathy presented to [facility] on 11/2/2023 for right-sided weakness.  Patient reported right-sided numbness and weakness noted upon waking this morning.   Per report, he fell while trying to transfer from his bed to his wheel chair. Patient's last known normal was at 11:00 p.m. last night, 11/01/2023. Per chart, the patient was uncooperative with PE. He was using explicit language and kicking/swinging at nursing staff.  Patient was placed under PEC in ED. He received IM Zyprexa. He was notably hyperglycemic and treated with insulin. CTA head and neck revealed no evidence of hemorrhage or other acute intracranial process on the precontrast images of the head, no acute or focal abnormality of the intracranial vasculature, and no flow-limiting stenosis or other significant abnormality involving the carotid and vertebral arteries.  Patient was admitted to hospital medicine service for further medical management and Neurology consulted for further evaluation.
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
## 2023-11-02 NOTE — Clinical Note
Diagnosis: Acute focal neurological deficit [001331]   Admitting Provider:: AUBRIE VELASCO [[id number]]   Future Attending Provider: AUBRIE VELASCO [[id number]]   Admit to which facility:: [facility] [[id number]]   Reason for IP Medical Treatment  (Clinical interventions that can only be accomplished in the IP setting? ) :: inpatient services necessary   Estimated Length of Stay:: 2 midnights   I certify that Inpatient services for greater than or equal to 2 midnights are medically necessary:: Yes   Plans for Post-Acute care--if anticipated (pick the single best option):: A. No post acute care anticipated at this time

## 2023-11-03 LAB
ALBUMIN SERPL-MCNC: 3.1 G/DL (ref 3.4–4.8)
ALBUMIN/GLOB SERPL: 1.1 RATIO (ref 1.1–2)
ALP SERPL-CCNC: 82 UNIT/L (ref 40–150)
ALT SERPL-CCNC: 24 UNIT/L (ref 0–55)
AMPHET UR QL SCN: POSITIVE
APPEARANCE UR: CLEAR
AST SERPL-CCNC: 22 UNIT/L (ref 5–34)
AV INDEX (PROSTH): 0.86
AV MEAN GRADIENT: 6 MMHG
AV PEAK GRADIENT: 10 MMHG
AV VELOCITY RATIO: 0.79
BACTERIA #/AREA URNS AUTO: ABNORMAL /HPF
BARBITURATE SCN PRESENT UR: NEGATIVE
BASOPHILS # BLD AUTO: 0.08 X10(3)/MCL
BASOPHILS NFR BLD AUTO: 1.2 %
BENZODIAZ UR QL SCN: NEGATIVE
BILIRUB SERPL-MCNC: 1.1 MG/DL
BILIRUB UR QL STRIP.AUTO: NEGATIVE
BSA FOR ECHO PROCEDURE: 2.23 M2
BUN SERPL-MCNC: 15 MG/DL (ref 8.4–25.7)
CALCIUM SERPL-MCNC: 8.7 MG/DL (ref 8.8–10)
CANNABINOIDS UR QL SCN: NEGATIVE
CHLORIDE SERPL-SCNC: 104 MMOL/L (ref 98–107)
CHOLEST SERPL-MCNC: 274 MG/DL
CHOLEST/HDLC SERPL: 7 {RATIO} (ref 0–5)
CO2 SERPL-SCNC: 28 MMOL/L (ref 23–31)
COCAINE UR QL SCN: NEGATIVE
COLOR UR AUTO: YELLOW
CREAT SERPL-MCNC: 0.83 MG/DL (ref 0.73–1.18)
CV ECHO LV RWT: 0.65 CM
DOP CALC AO PEAK VEL: 1.57 M/S
DOP CALC AO VTI: 33.4 CM
DOP CALC LVOT PEAK VEL: 1.24 M/S
DOP CALCLVOT PEAK VEL VTI: 28.6 CM
E WAVE DECELERATION TIME: 293 MSEC
E/A RATIO: 0.63
E/E' RATIO: 10.38 M/S
ECHO LV POSTERIOR WALL: 1.3 CM (ref 0.6–1.1)
EOSINOPHIL # BLD AUTO: 0.2 X10(3)/MCL (ref 0–0.9)
EOSINOPHIL NFR BLD AUTO: 3 %
ERYTHROCYTE [DISTWIDTH] IN BLOOD BY AUTOMATED COUNT: 13 % (ref 11.5–17)
EST. AVERAGE GLUCOSE BLD GHB EST-MCNC: 340.8 MG/DL
FENTANYL UR QL SCN: NEGATIVE
FRACTIONAL SHORTENING: 40 % (ref 28–44)
GFR SERPLBLD CREATININE-BSD FMLA CKD-EPI: >60 MLS/MIN/1.73/M2
GLOBULIN SER-MCNC: 2.7 GM/DL (ref 2.4–3.5)
GLUCOSE SERPL-MCNC: 228 MG/DL (ref 82–115)
GLUCOSE UR QL STRIP.AUTO: ABNORMAL
HBA1C MFR BLD: 13.5 %
HCT VFR BLD AUTO: 45.2 % (ref 42–52)
HDLC SERPL-MCNC: 40 MG/DL (ref 35–60)
HGB BLD-MCNC: 16 G/DL (ref 14–18)
IMM GRANULOCYTES # BLD AUTO: 0.03 X10(3)/MCL (ref 0–0.04)
IMM GRANULOCYTES NFR BLD AUTO: 0.4 %
INTERVENTRICULAR SEPTUM: 1.2 CM (ref 0.6–1.1)
KETONES UR QL STRIP.AUTO: ABNORMAL
LDLC SERPL CALC-MCNC: 198 MG/DL (ref 50–140)
LEFT ATRIUM SIZE: 4.1 CM
LEFT ATRIUM VOLUME INDEX MOD: 22.9 ML/M2
LEFT ATRIUM VOLUME MOD: 50.4 CM3
LEFT INTERNAL DIMENSION IN SYSTOLE: 2.4 CM (ref 2.1–4)
LEFT VENTRICLE DIASTOLIC VOLUME INDEX: 31.82 ML/M2
LEFT VENTRICLE DIASTOLIC VOLUME: 70 ML
LEFT VENTRICLE MASS INDEX: 80 G/M2
LEFT VENTRICLE SYSTOLIC VOLUME INDEX: 9.2 ML/M2
LEFT VENTRICLE SYSTOLIC VOLUME: 20.2 ML
LEFT VENTRICULAR INTERNAL DIMENSION IN DIASTOLE: 4 CM (ref 3.5–6)
LEFT VENTRICULAR MASS: 175.84 G
LEUKOCYTE ESTERASE UR QL STRIP.AUTO: NEGATIVE
LV LATERAL E/E' RATIO: 7.55 M/S
LV SEPTAL E/E' RATIO: 16.6 M/S
LVOT MG: 3 MMHG
LVOT MV: 0.85 CM/S
LYMPHOCYTES # BLD AUTO: 2.55 X10(3)/MCL (ref 0.6–4.6)
LYMPHOCYTES NFR BLD AUTO: 37.8 %
MCH RBC QN AUTO: 33.8 PG (ref 27–31)
MCHC RBC AUTO-ENTMCNC: 35.4 G/DL (ref 33–36)
MCV RBC AUTO: 95.6 FL (ref 80–94)
MDMA UR QL SCN: NEGATIVE
MONOCYTES # BLD AUTO: 0.61 X10(3)/MCL (ref 0.1–1.3)
MONOCYTES NFR BLD AUTO: 9.1 %
MUCOUS THREADS URNS QL MICRO: ABNORMAL /LPF
MV PEAK A VEL: 1.31 M/S
MV PEAK E VEL: 0.83 M/S
NEUTROPHILS # BLD AUTO: 3.27 X10(3)/MCL (ref 2.1–9.2)
NEUTROPHILS NFR BLD AUTO: 48.5 %
NITRITE UR QL STRIP.AUTO: NEGATIVE
NRBC BLD AUTO-RTO: 0 %
OHS LV EJECTION FRACTION SIMPSONS BIPLANE MOD: 58 %
OPIATES UR QL SCN: NEGATIVE
PCP UR QL: NEGATIVE
PH UR STRIP.AUTO: 6 [PH]
PH UR: 6 [PH] (ref 3–11)
PLATELET # BLD AUTO: 126 X10(3)/MCL (ref 130–400)
PLATELETS.RETICULATED NFR BLD AUTO: 7.7 % (ref 0.9–11.2)
PMV BLD AUTO: 11.8 FL (ref 7.4–10.4)
POCT GLUCOSE: 251 MG/DL (ref 70–110)
POCT GLUCOSE: 259 MG/DL (ref 70–110)
POTASSIUM SERPL-SCNC: 4.2 MMOL/L (ref 3.5–5.1)
PROT SERPL-MCNC: 5.8 GM/DL (ref 5.8–7.6)
PROT UR QL STRIP.AUTO: ABNORMAL
RA PRESSURE ESTIMATED: 3 MMHG
RBC # BLD AUTO: 4.73 X10(6)/MCL (ref 4.7–6.1)
RBC #/AREA URNS AUTO: ABNORMAL /HPF
RBC UR QL AUTO: NEGATIVE
RIGHT VENTRICULAR END-DIASTOLIC DIMENSION: 4.1 CM
SODIUM SERPL-SCNC: 140 MMOL/L (ref 136–145)
SPECIFIC GRAVITY, URINE AUTO (.000) (OHS): 1.02 (ref 1–1.03)
SQUAMOUS #/AREA URNS LPF: ABNORMAL /HPF
TDI LATERAL: 0.11 M/S
TDI SEPTAL: 0.05 M/S
TDI: 0.08 M/S
TRICUSPID ANNULAR PLANE SYSTOLIC EXCURSION: 2.28 CM
TRIGL SERPL-MCNC: 178 MG/DL (ref 34–140)
UROBILINOGEN UR STRIP-ACNC: NORMAL
VLDLC SERPL CALC-MCNC: 36 MG/DL
WBC # SPEC AUTO: 6.74 X10(3)/MCL (ref 4.5–11.5)
WBC #/AREA URNS AUTO: ABNORMAL /HPF
Z-SCORE OF LEFT VENTRICULAR DIMENSION IN END DIASTOLE: -6.3
Z-SCORE OF LEFT VENTRICULAR DIMENSION IN END SYSTOLE: -5.08

## 2023-11-03 PROCEDURE — 21400001 HC TELEMETRY ROOM

## 2023-11-03 PROCEDURE — 25000003 PHARM REV CODE 250: Performed by: INTERNAL MEDICINE

## 2023-11-03 PROCEDURE — 94761 N-INVAS EAR/PLS OXIMETRY MLT: CPT

## 2023-11-03 PROCEDURE — 81001 URINALYSIS AUTO W/SCOPE: CPT | Performed by: EMERGENCY MEDICINE

## 2023-11-03 PROCEDURE — 83036 HEMOGLOBIN GLYCOSYLATED A1C: CPT | Performed by: PHYSICIAN ASSISTANT

## 2023-11-03 PROCEDURE — 99900035 HC TECH TIME PER 15 MIN (STAT)

## 2023-11-03 PROCEDURE — 99233 PR SUBSEQUENT HOSPITAL CARE,LEVL III: ICD-10-PCS | Mod: ,,, | Performed by: PSYCHIATRY & NEUROLOGY

## 2023-11-03 PROCEDURE — 80061 LIPID PANEL: CPT | Performed by: PHYSICIAN ASSISTANT

## 2023-11-03 PROCEDURE — 80307 DRUG TEST PRSMV CHEM ANLYZR: CPT | Performed by: PHYSICIAN ASSISTANT

## 2023-11-03 PROCEDURE — 80053 COMPREHEN METABOLIC PANEL: CPT | Performed by: PHYSICIAN ASSISTANT

## 2023-11-03 PROCEDURE — 99233 SBSQ HOSP IP/OBS HIGH 50: CPT | Mod: ,,, | Performed by: PSYCHIATRY & NEUROLOGY

## 2023-11-03 PROCEDURE — 85025 COMPLETE CBC W/AUTO DIFF WBC: CPT | Performed by: PHYSICIAN ASSISTANT

## 2023-11-03 PROCEDURE — 92610 EVALUATE SWALLOWING FUNCTION: CPT

## 2023-11-03 RX ORDER — OXCARBAZEPINE 150 MG/1
150 TABLET, FILM COATED ORAL 2 TIMES DAILY
Status: DISCONTINUED | OUTPATIENT
Start: 2023-11-03 | End: 2023-11-04 | Stop reason: HOSPADM

## 2023-11-03 RX ORDER — RISPERIDONE 0.25 MG/1
0.5 TABLET ORAL 2 TIMES DAILY
Status: DISCONTINUED | OUTPATIENT
Start: 2023-11-03 | End: 2023-11-04 | Stop reason: HOSPADM

## 2023-11-03 RX ORDER — ASPIRIN 300 MG/1
300 SUPPOSITORY RECTAL DAILY
Status: DISCONTINUED | OUTPATIENT
Start: 2023-11-03 | End: 2023-11-04

## 2023-11-03 RX ADMIN — SODIUM CHLORIDE: 9 INJECTION, SOLUTION INTRAVENOUS at 11:11

## 2023-11-03 NOTE — PROGRESS NOTES
[facility] 4th Floor Medical Telemetry  Neurology  Progress Note    Patient Name: Con Arnold  MRN: [id number]  Admission Date: 11/2/2023  Hospital Length of Stay: 1 days  Code Status: Full Code   Attending Provider: Esme Gates MD  Primary Care Physician: [facility] -   Principal Problem:<principal problem not specified>    HPI:   Con Arnold is a 63 y.o. male with a past medical history of essential hypertension, hyperlipidemia, TBI, diabetes mellitus type 2, COPD, neuropathy, cataracts, glaucoma, and neuropathy presented to [facility] on 11/2/2023 for right-sided weakness.  Patient reported right-sided numbness and weakness noted upon waking this morning.   Per report, he fell while trying to transfer from his bed to his wheel chair. Patient's last known normal was at 11:00 p.m. last night, 11/01/2023. Per chart, the patient was uncooperative with PE. He was using explicit language and kicking/swinging at nursing staff.  Patient was placed under PEC in ED. He received IM Zyprexa. He was notably hyperglycemic and treated with insulin. CTA head and neck revealed no evidence of hemorrhage or other acute intracranial process on the precontrast images of the head, no acute or focal abnormality of the intracranial vasculature, and no flow-limiting stenosis or other significant abnormality involving the carotid and vertebral arteries.  Patient was admitted to hospital medicine service for further medical management and Neurology consulted for further evaluation.       Overview/Hospital Course:  No notes on file        Subjective:     Interval History: RN reports that patient remains uncooperative. He has refused to work with PT/OT. He did complete and pass his swallow study. I was asked not to enter room as he was very agitated. MRI not completed. They are attempting to complete screening process. Per PT/OT and RN, patient has no focal deficits. He is moving his BUE  and RLE freely.     Current Neurological Medications:     Current Facility-Administered Medications   Medication Dose Route Frequency Provider Last Rate Last Admin    0.9%  NaCl infusion   Intravenous Continuous Esme Gates MD 75 mL/hr at 11/02/23 2112 New Bag at 11/02/23 2112    aspirin suppository 300 mg  300 mg Rectal Daily Vero See, NP        dextrose 10% bolus 125 mL 125 mL  12.5 g Intravenous PRN Adelso Jurado PA-C        dextrose 10% bolus 250 mL 250 mL  25 g Intravenous PRN Adelso Jurado PA-C        glucagon (human recombinant) injection 1 mg  1 mg Intramuscular PRN Adelso Jurado PA-C        hydrALAZINE injection 10 mg  10 mg Intravenous Q6H PRN Adelso Jurado PA-C        insulin aspart U-100 injection 0-10 Units  0-10 Units Subcutaneous Q6H PRN Adelso Jurado PA-C        ziprasidone injection 10 mg  10 mg Intramuscular Q6H PRN Adelso Jurado PA-C           Review of Systems  Objective:     Vital Signs (Most Recent):  Temp: 98.1 °F (36.7 °C) (11/03/23 0705)  Pulse: 63 (11/03/23 0705)  Resp: 15 (11/03/23 0705)  BP: (!) 144/83 (11/03/23 0705)  SpO2: (!) 93 % (11/03/23 0900) Vital Signs (24h Range):  Temp:  [98.1 °F (36.7 °C)-98.6 °F (37 °C)] 98.1 °F (36.7 °C)  Pulse:  [53-73] 63  Resp:  [14-22] 15  SpO2:  [93 %-100 %] 93 %  BP: ()/(54-99) 144/83     Weight: 97.5 kg (215 lb)  Body mass index is 29.16 kg/m².     Physical Exam      Not physically  seen by me. Will attempt to see him later today.     Significant Labs: CBC:   Recent Labs   Lab 11/02/23  1105 11/03/23  0543   WBC 5.69 6.74   HGB 16.4 16.0   HCT 45.2 45.2   * 126*     CMP:   Recent Labs   Lab 11/02/23  1105 11/03/23  0543   * 140   K 4.1 4.2   CO2 30 28   BUN 11.0 15.0   CREATININE 1.09 0.83   CALCIUM 9.3 8.7*   ALBUMIN 3.3* 3.1*   BILITOT 1.1 1.1   ALKPHOS 93 82   AST 27 22   ALT 30 24       Significant Imaging: I have reviewed all pertinent imaging results/findings within the past 24  hours.    Assessment and Plan:     Right sided weakness  Acute right sided weakness - r/o stroke    Plan:   Admit for stroke workup  Allow permissive HTN ... prn hydralazine and labetalol for SBP > 220 or DBP > 120     - after 24 hours from symptom onset, ok to normalize blood pressure  Neuro checks q4hr ... stat CTh if any neuro change   Begin ASA 325mg daily   Atorvastatin 40 mg daily  DVT ppx with SCD or lovenox 40 s/c daily  Continuous telemetry monitoring  Ok to mobilize.   MRI pending. If unable to screen, will need to repeat CT head in 24 hours to rule out stroke.    Further recommendations to follow.     Stroke workup   -CTh: negative   -MRI brain:  -CTA h/n: negative  -ECHO:  -CUS: negative   -LDL: 198  -A1c: 13.5  -TSH: 1.289  -home medications: atorvastatin 40 mg daily        VTE Risk Mitigation (From admission, onward)         Ordered     Place sequential compression device  Until discontinued         11/02/23 5029                Vero See NP  Neurology  [facility] - 4th Floor Medical Telemetry

## 2023-11-03 NOTE — SUBJECTIVE & OBJECTIVE
Subjective:     Interval History: RN reports that patient remains uncooperative. He has refused to work with PT/OT. He did complete and pass his swallow study. I was asked not to enter room as he was very agitated. MRI not completed. They are attempting to complete screening process. Per PT/OT and RN, patient has no focal deficits. He is moving his BUE and RLE freely.     Current Neurological Medications:     Current Facility-Administered Medications   Medication Dose Route Frequency Provider Last Rate Last Admin    0.9%  NaCl infusion   Intravenous Continuous Esme Gates MD 75 mL/hr at 11/02/23 2112 New Bag at 11/02/23 2112    aspirin suppository 300 mg  300 mg Rectal Daily Vero See, NP        dextrose 10% bolus 125 mL 125 mL  12.5 g Intravenous PRN Adelso Jurado PA-C        dextrose 10% bolus 250 mL 250 mL  25 g Intravenous PRN Adelso Jurado PA-C        glucagon (human recombinant) injection 1 mg  1 mg Intramuscular PRN Adelso Jurado PA-C        hydrALAZINE injection 10 mg  10 mg Intravenous Q6H PRN Adelso Jurado PA-C        insulin aspart U-100 injection 0-10 Units  0-10 Units Subcutaneous Q6H PRN Adelso Jurado PA-C        ziprasidone injection 10 mg  10 mg Intramuscular Q6H PRN Adelso Jurado PA-C           Review of Systems  Objective:     Vital Signs (Most Recent):  Temp: 98.1 °F (36.7 °C) (11/03/23 0705)  Pulse: 63 (11/03/23 0705)  Resp: 15 (11/03/23 0705)  BP: (!) 144/83 (11/03/23 0705)  SpO2: (!) 93 % (11/03/23 0900) Vital Signs (24h Range):  Temp:  [98.1 °F (36.7 °C)-98.6 °F (37 °C)] 98.1 °F (36.7 °C)  Pulse:  [53-73] 63  Resp:  [14-22] 15  SpO2:  [93 %-100 %] 93 %  BP: ()/(54-99) 144/83     Weight: 97.5 kg (215 lb)  Body mass index is 29.16 kg/m².     Physical Exam      Not physically  seen by me. Will attempt to see him later today.     Significant Labs: CBC:   Recent Labs   Lab 11/02/23  1105 11/03/23  0543   WBC 5.69 6.74   HGB 16.4 16.0   HCT 45.2 45.2   PLT  100* 126*     CMP:   Recent Labs   Lab 11/02/23  1105 11/03/23  0543   * 140   K 4.1 4.2   CO2 30 28   BUN 11.0 15.0   CREATININE 1.09 0.83   CALCIUM 9.3 8.7*   ALBUMIN 3.3* 3.1*   BILITOT 1.1 1.1   ALKPHOS 93 82   AST 27 22   ALT 30 24       Significant Imaging: I have reviewed all pertinent imaging results/findings within the past 24 hours.

## 2023-11-03 NOTE — PT/OT/SLP PROGRESS
Occupational Therapy      Patient Name:  Con Arnold   MRN:  [id number]    OT orders received this date. Attempted OT eval this a.m., pt refused and presented as uncooperative and aggressive. OT will sign off.     11/3/2023

## 2023-11-03 NOTE — PT/OT/SLP PROGRESS
Physical Therapy Treatment    Patient Name:  Cno Arnold   MRN:  [id number]      Two attempts to see patient this a.m. Patient uncooperative and using explicit language. We are signing off. Home vs prison [facility].
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
## 2023-11-03 NOTE — ASSESSMENT & PLAN NOTE
Acute right sided weakness - r/o stroke    Plan:   Admit for stroke workup  Allow permissive HTN ... prn hydralazine and labetalol for SBP > 220 or DBP > 120     - after 24 hours from symptom onset, ok to normalize blood pressure  Neuro checks q4hr ... stat CTh if any neuro change   Begin ASA 325mg daily   Atorvastatin 40 mg daily  DVT ppx with SCD or lovenox 40 s/c daily  Continuous telemetry monitoring  Ok to mobilize.   MRI pending. If unable to screen, will need to repeat CT head in 24 hours to rule out stroke.    Further recommendations to follow.     Stroke workup   -CTh: negative   -MRI brain:  -CTA h/n: negative  -ECHO:  -CUS: negative   -LDL: 198  -A1c: 13.5  -TSH: 1.289  -home medications: atorvastatin 40 mg daily
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
## 2023-11-03 NOTE — PLAN OF CARE
Problem: Violence Risk or Actual  Goal: Anger and Impulse Control  Outcome: Ongoing, Progressing     Problem: Adult Inpatient Plan of Care  Goal: Plan of Care Review  Outcome: Ongoing, Progressing  Goal: Patient-Specific Goal (Individualized)  Outcome: Ongoing, Progressing  Goal: Absence of Hospital-Acquired Illness or Injury  Outcome: Ongoing, Progressing  Goal: Optimal Comfort and Wellbeing  Outcome: Ongoing, Progressing  Goal: Readiness for Transition of Care  Outcome: Ongoing, Progressing     Problem: Diabetes Comorbidity  Goal: Blood Glucose Level Within Targeted Range  Outcome: Ongoing, Progressing     Problem: Skin Injury Risk Increased  Goal: Skin Health and Integrity  Outcome: Ongoing, Progressing

## 2023-11-03 NOTE — PLAN OF CARE
Problem: Adult Inpatient Plan of Care  Goal: Plan of Care Review  Outcome: Ongoing, Progressing  Goal: Patient-Specific Goal (Individualized)  Outcome: Ongoing, Progressing  Goal: Absence of Hospital-Acquired Illness or Injury  Outcome: Ongoing, Progressing  Goal: Optimal Comfort and Wellbeing  Outcome: Ongoing, Progressing  Goal: Readiness for Transition of Care  Outcome: Ongoing, Progressing     Problem: Diabetes Comorbidity  Goal: Blood Glucose Level Within Targeted Range  Outcome: Ongoing, Progressing     Problem: Skin Injury Risk Increased  Goal: Skin Health and Integrity  Outcome: Ongoing, Progressing     Problem: Infection  Goal: Absence of Infection Signs and Symptoms  Outcome: Ongoing, Progressing     Problem: Bowel Elimination Impaired (Stroke, Ischemic/Transient Ischemic Attack)  Goal: Effective Bowel Elimination  Outcome: Ongoing, Progressing     Problem: Cerebral Tissue Perfusion (Stroke, Ischemic/Transient Ischemic Attack)  Goal: Optimal Cerebral Tissue Perfusion  Outcome: Ongoing, Progressing     Problem: Cognitive Impairment (Stroke, Ischemic/Transient Ischemic Attack)  Goal: Optimal Cognitive Function  Outcome: Ongoing, Progressing     Problem: Communication Impairment (Stroke, Ischemic/Transient Ischemic Attack)  Goal: Improved Communication Skills  Outcome: Ongoing, Progressing     Problem: Functional Ability Impaired (Stroke, Ischemic/Transient Ischemic Attack)  Goal: Optimal Functional Ability  Outcome: Ongoing, Progressing     Problem: Respiratory Compromise (Stroke, Ischemic/Transient Ischemic Attack)  Goal: Effective Oxygenation and Ventilation  Outcome: Ongoing, Progressing     Problem: Sensorimotor Impairment (Stroke, Ischemic/Transient Ischemic Attack)  Goal: Improved Sensorimotor Function  Outcome: Ongoing, Progressing     Problem: Swallowing Impairment (Stroke, Ischemic/Transient Ischemic Attack)  Goal: Optimal Eating and Swallowing without Aspiration  Outcome: Ongoing, Progressing      Problem: Urinary Elimination Impaired (Stroke, Ischemic/Transient Ischemic Attack)  Goal: Effective Urinary Elimination  Outcome: Ongoing, Progressing     Problem: Violence Risk or Actual  Goal: Anger and Impulse Control  Outcome: Ongoing, Not Progressing     Problem: Adjustment to Illness (Stroke, Ischemic/Transient Ischemic Attack)  Goal: Optimal Coping  Outcome: Ongoing, Not Progressing

## 2023-11-04 VITALS
OXYGEN SATURATION: 93 % | RESPIRATION RATE: 17 BRPM | HEART RATE: 65 BPM | BODY MASS INDEX: 25.65 KG/M2 | WEIGHT: 189.38 LBS | TEMPERATURE: 98 F | SYSTOLIC BLOOD PRESSURE: 137 MMHG | HEIGHT: 72 IN | DIASTOLIC BLOOD PRESSURE: 73 MMHG

## 2023-11-04 LAB
BASOPHILS # BLD AUTO: 0.08 X10(3)/MCL
BASOPHILS NFR BLD AUTO: 1.5 %
EOSINOPHIL # BLD AUTO: 0.15 X10(3)/MCL (ref 0–0.9)
EOSINOPHIL NFR BLD AUTO: 2.7 %
ERYTHROCYTE [DISTWIDTH] IN BLOOD BY AUTOMATED COUNT: 12.6 % (ref 11.5–17)
HCT VFR BLD AUTO: 46.6 % (ref 42–52)
HGB BLD-MCNC: 16.8 G/DL (ref 14–18)
IMM GRANULOCYTES # BLD AUTO: 0.02 X10(3)/MCL (ref 0–0.04)
IMM GRANULOCYTES NFR BLD AUTO: 0.4 %
LYMPHOCYTES # BLD AUTO: 2.32 X10(3)/MCL (ref 0.6–4.6)
LYMPHOCYTES NFR BLD AUTO: 42.1 %
MCH RBC QN AUTO: 33.7 PG (ref 27–31)
MCHC RBC AUTO-ENTMCNC: 36.1 G/DL (ref 33–36)
MCV RBC AUTO: 93.6 FL (ref 80–94)
MONOCYTES # BLD AUTO: 0.6 X10(3)/MCL (ref 0.1–1.3)
MONOCYTES NFR BLD AUTO: 10.9 %
NEUTROPHILS # BLD AUTO: 2.34 X10(3)/MCL (ref 2.1–9.2)
NEUTROPHILS NFR BLD AUTO: 42.4 %
NRBC BLD AUTO-RTO: 0 %
PLATELET # BLD AUTO: 126 X10(3)/MCL (ref 130–400)
PLATELETS.RETICULATED NFR BLD AUTO: 6.3 % (ref 0.9–11.2)
PMV BLD AUTO: 11 FL (ref 7.4–10.4)
POCT GLUCOSE: 440 MG/DL (ref 70–110)
RBC # BLD AUTO: 4.98 X10(6)/MCL (ref 4.7–6.1)
WBC # SPEC AUTO: 5.51 X10(3)/MCL (ref 4.5–11.5)

## 2023-11-04 PROCEDURE — 99233 PR SUBSEQUENT HOSPITAL CARE,LEVL III: ICD-10-PCS | Mod: ,,, | Performed by: PSYCHIATRY & NEUROLOGY

## 2023-11-04 PROCEDURE — 99233 SBSQ HOSP IP/OBS HIGH 50: CPT | Mod: ,,, | Performed by: PSYCHIATRY & NEUROLOGY

## 2023-11-04 PROCEDURE — 63600175 PHARM REV CODE 636 W HCPCS: Performed by: PHYSICIAN ASSISTANT

## 2023-11-04 PROCEDURE — 85025 COMPLETE CBC W/AUTO DIFF WBC: CPT

## 2023-11-04 RX ORDER — INSULIN ASPART 100 [IU]/ML
10 INJECTION, SOLUTION INTRAVENOUS; SUBCUTANEOUS
Status: DISCONTINUED | OUTPATIENT
Start: 2023-11-04 | End: 2023-11-04 | Stop reason: HOSPADM

## 2023-11-04 RX ORDER — DULOXETIN HYDROCHLORIDE 30 MG/1
30 CAPSULE, DELAYED RELEASE ORAL 2 TIMES DAILY
Status: DISCONTINUED | OUTPATIENT
Start: 2023-11-04 | End: 2023-11-04 | Stop reason: HOSPADM

## 2023-11-04 RX ORDER — DEXTROSE MONOHYDRATE 100 MG/ML
INJECTION, SOLUTION INTRAVENOUS CONTINUOUS
Status: DISCONTINUED | OUTPATIENT
Start: 2023-11-04 | End: 2023-11-04 | Stop reason: HOSPADM

## 2023-11-04 RX ORDER — GLUCAGON 1 MG
1 KIT INJECTION
Status: DISCONTINUED | OUTPATIENT
Start: 2023-11-04 | End: 2023-11-04 | Stop reason: HOSPADM

## 2023-11-04 RX ORDER — OXCARBAZEPINE 150 MG/1
150 TABLET, FILM COATED ORAL 2 TIMES DAILY
Qty: 60 TABLET | Refills: 11 | Status: SHIPPED | OUTPATIENT
Start: 2023-11-04 | End: 2024-11-03

## 2023-11-04 RX ORDER — RISPERIDONE 0.5 MG/1
0.5 TABLET ORAL 2 TIMES DAILY
Qty: 60 TABLET | Refills: 11 | Status: SHIPPED | OUTPATIENT
Start: 2023-11-04 | End: 2024-11-03

## 2023-11-04 RX ORDER — ATORVASTATIN CALCIUM 40 MG/1
40 TABLET, FILM COATED ORAL DAILY
Status: DISCONTINUED | OUTPATIENT
Start: 2023-11-04 | End: 2023-11-04 | Stop reason: HOSPADM

## 2023-11-04 RX ORDER — ASPIRIN 325 MG
325 TABLET, DELAYED RELEASE (ENTERIC COATED) ORAL DAILY
Status: DISCONTINUED | OUTPATIENT
Start: 2023-11-04 | End: 2023-11-04 | Stop reason: HOSPADM

## 2023-11-04 RX ORDER — LISINOPRIL 10 MG/1
10 TABLET ORAL DAILY
Status: DISCONTINUED | OUTPATIENT
Start: 2023-11-04 | End: 2023-11-04 | Stop reason: HOSPADM

## 2023-11-04 RX ORDER — IBUPROFEN 200 MG
24 TABLET ORAL
Status: DISCONTINUED | OUTPATIENT
Start: 2023-11-04 | End: 2023-11-04 | Stop reason: HOSPADM

## 2023-11-04 RX ORDER — IBUPROFEN 200 MG
16 TABLET ORAL
Status: DISCONTINUED | OUTPATIENT
Start: 2023-11-04 | End: 2023-11-04 | Stop reason: HOSPADM

## 2023-11-04 RX ORDER — ASPIRIN 325 MG
325 TABLET, DELAYED RELEASE (ENTERIC COATED) ORAL DAILY
Qty: 360 TABLET | Refills: 0 | Status: SHIPPED | OUTPATIENT
Start: 2023-11-05 | End: 2024-11-04

## 2023-11-04 RX ADMIN — INSULIN ASPART 10 UNITS: 100 INJECTION, SOLUTION INTRAVENOUS; SUBCUTANEOUS at 10:11

## 2023-11-04 NOTE — SUBJECTIVE & OBJECTIVE
Subjective:     Interval History: RN reports that patient still refusing any further care. He was sitting in chair at the side of bed when I entered. He was somewhat cooperative while I was talking with him. He is ready to leave. He did complain of right arm numbness but I noted no other neurological deficits. He did agree to go down for repeat CT Head.     Current Neurological Medications:     Current Facility-Administered Medications   Medication Dose Route Frequency Provider Last Rate Last Admin    aspirin EC tablet 325 mg  325 mg Oral Daily Nikkie Santizo,         dextrose 10% bolus 125 mL 125 mL  12.5 g Intravenous PRN Adelso Jurado PA-C        dextrose 10% bolus 250 mL 250 mL  25 g Intravenous PRN Adelso Jurado PA-C        glucagon (human recombinant) injection 1 mg  1 mg Intramuscular PRN Adelso Jurado PA-C        hydrALAZINE injection 10 mg  10 mg Intravenous Q6H PRN Adelso Jurado PA-C        insulin aspart U-100 injection 0-10 Units  0-10 Units Subcutaneous Q6H PRN Adelso Jurado PA-C        OXcarbazepine tablet 150 mg  150 mg Oral BID Paula Montes, NP        risperiDONE tablet 0.5 mg  0.5 mg Oral BID Paula Montes, NP        ziprasidone injection 10 mg  10 mg Intramuscular Q6H PRN Adelso Jurado PA-C           Review of Systems  Objective:     Vital Signs (Most Recent):  Temp: 98.1 °F (36.7 °C) (11/04/23 0916)  Pulse: 68 (11/04/23 0916)  Resp: 18 (11/04/23 0916)  BP: (!) 148/89 (11/04/23 0916)  SpO2: 96 % (11/04/23 0916) Vital Signs (24h Range):  Temp:  [97.9 °F (36.6 °C)-98.8 °F (37.1 °C)] 98.1 °F (36.7 °C)  Pulse:  [61-76] 68  Resp:  [14-18] 18  SpO2:  [92 %-97 %] 96 %  BP: (125-152)/(70-89) 148/89     Weight: 85.9 kg (189 lb 6 oz)  Body mass index is 25.68 kg/m².     Physical Exam      Did not fully cooperate. He is alert. Answered my questions correctly. Self reported right arm and hand numbness. He did move BUE freely and RLE. LLE BKA present.     Significant Labs: BMP:    Recent Labs   Lab 11/02/23  1105 11/03/23  0543   * 140   K 4.1 4.2   CO2 30 28   BUN 11.0 15.0   CREATININE 1.09 0.83   CALCIUM 9.3 8.7*     CBC:   Recent Labs   Lab 11/02/23  1105 11/03/23  0543 11/04/23  0614   WBC 5.69 6.74 5.51   HGB 16.4 16.0 16.8   HCT 45.2 45.2 46.6   * 126* 126*       Significant Imaging: I have reviewed all pertinent imaging results/findings within the past 24 hours.
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
## 2023-11-04 NOTE — ASSESSMENT & PLAN NOTE
Acute right sided weakness - r/o stroke    Plan:   Repeat CT head negative for acute stroke.   Other imaging unrevealing.  Need to continue home aspirin 81 mg  , would recommend he increased dose of atorvastatin.  A1c 13.5, would benefit from improved glycemic control.   From a stroke standpoint, there are no further recommendations  Appreciate Psych recommendations for further care.  Will sign of at this time.     .     Stroke workup   -CTh: negative   -MRI brain: not applicable   -CTA h/n: negative  -ECHO: EF 60-65%, mildly dilated LA,  BS not done  -CUS: negative   -LDL: 198  -A1c: 13.5  -TSH: 1.289  -home medications: atorvastatin 40 mg daily

## 2023-11-04 NOTE — PLAN OF CARE
11/04/23 1559   Final Note   Assessment Type Final Discharge Note   Anticipated Discharge Disposition Psych   Post-Acute Status   Post-Acute Authorization Placement   Post-Acute Placement Status Set-up Complete/Auth obtained   Discharge Delays None known at this time     Patient will discharge to [facility] in [place]. Accepting is Blanca York NP. Patient not able to transport by SPD due to needing wheelchair accessible van which is not available today per [facility]. Transport arranged via [facility].
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
## 2023-11-04 NOTE — PT/OT/SLP DISCHARGE
[facility]  Speech Language Pathology Department  Diet Tolerance Follow-up    Patient Name:  Con Arnold   MRN:  [id number]  Admitting Diagnosis: CVA workup    Recommendations:     General recommendations:  SLP intervention not indicated  Diet texture/consistency recommendations:  Regular solids (IDDSI 7) and thin liquids (IDDSI 0)  Medications: per patient preference  Swallow strategies/precautions: upright for PO intake  Precautions: Standard,      Diet tolerance:     Nursing reports no difficulty regarding diet tolerance. No further skilled ST indicated            11/04/2023
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
## 2023-11-04 NOTE — PLAN OF CARE
Patient is medically ready for placement. Notified Mago with [facility] Intake.

## 2023-11-04 NOTE — PLAN OF CARE
Problem: Violence Risk or Actual  Goal: Anger and Impulse Control  Outcome: Ongoing, Progressing     Problem: Adult Inpatient Plan of Care  Goal: Plan of Care Review  Outcome: Ongoing, Progressing  Goal: Patient-Specific Goal (Individualized)  Outcome: Ongoing, Progressing  Goal: Absence of Hospital-Acquired Illness or Injury  Outcome: Ongoing, Progressing  Goal: Optimal Comfort and Wellbeing  Outcome: Ongoing, Progressing  Goal: Readiness for Transition of Care  Outcome: Ongoing, Progressing     Problem: Diabetes Comorbidity  Goal: Blood Glucose Level Within Targeted Range  Outcome: Ongoing, Progressing     Problem: Skin Injury Risk Increased  Goal: Skin Health and Integrity  Outcome: Ongoing, Progressing     Problem: Infection  Goal: Absence of Infection Signs and Symptoms  Outcome: Ongoing, Progressing     Problem: Adjustment to Illness (Stroke, Ischemic/Transient Ischemic Attack)  Goal: Optimal Coping  Outcome: Ongoing, Progressing     Problem: Bowel Elimination Impaired (Stroke, Ischemic/Transient Ischemic Attack)  Goal: Effective Bowel Elimination  Outcome: Ongoing, Progressing     Problem: Cerebral Tissue Perfusion (Stroke, Ischemic/Transient Ischemic Attack)  Goal: Optimal Cerebral Tissue Perfusion  Outcome: Ongoing, Progressing     Problem: Cognitive Impairment (Stroke, Ischemic/Transient Ischemic Attack)  Goal: Optimal Cognitive Function  Outcome: Ongoing, Progressing     Problem: Communication Impairment (Stroke, Ischemic/Transient Ischemic Attack)  Goal: Improved Communication Skills  Outcome: Ongoing, Progressing     Problem: Functional Ability Impaired (Stroke, Ischemic/Transient Ischemic Attack)  Goal: Optimal Functional Ability  Outcome: Ongoing, Progressing     Problem: Respiratory Compromise (Stroke, Ischemic/Transient Ischemic Attack)  Goal: Effective Oxygenation and Ventilation  Outcome: Ongoing, Progressing     Problem: Sensorimotor Impairment (Stroke, Ischemic/Transient Ischemic  Attack)  Goal: Improved Sensorimotor Function  Outcome: Ongoing, Progressing     Problem: Swallowing Impairment (Stroke, Ischemic/Transient Ischemic Attack)  Goal: Optimal Eating and Swallowing without Aspiration  Outcome: Ongoing, Progressing     Problem: Urinary Elimination Impaired (Stroke, Ischemic/Transient Ischemic Attack)  Goal: Effective Urinary Elimination  Outcome: Ongoing, Progressing
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
## 2023-11-10 LAB
LEFT CCA DIST DIAS: 13 CM/S
LEFT CCA DIST SYS: 60 CM/S
LEFT CCA PROX DIAS: 10 CM/S
LEFT CCA PROX SYS: 60 CM/S
LEFT ECA DIAS: 6 CM/S
LEFT ECA SYS: 59 CM/S
LEFT ICA DIST DIAS: 22 CM/S
LEFT ICA DIST SYS: 79 CM/S
LEFT ICA MID DIAS: 18 CM/S
LEFT ICA MID SYS: 59 CM/S
LEFT ICA PROX DIAS: 15 CM/S
LEFT ICA PROX SYS: 54 CM/S
LEFT VERTEBRAL DIAS: 6 CM/S
LEFT VERTEBRAL SYS: 29 CM/S
OHS CV CAROTID RIGHT ICA EDV HIGHEST: 14
OHS CV CAROTID ULTRASOUND LEFT ICA/CCA RATIO: 1.32
OHS CV CAROTID ULTRASOUND RIGHT ICA/CCA RATIO: 0.95
OHS CV PV CAROTID LEFT HIGHEST CCA: 60
OHS CV PV CAROTID LEFT HIGHEST ICA: 79
OHS CV PV CAROTID RIGHT HIGHEST CCA: 69
OHS CV PV CAROTID RIGHT HIGHEST ICA: 57
OHS CV US CAROTID LEFT HIGHEST EDV: 22
RIGHT CCA DIST DIAS: 11 CM/S
RIGHT CCA DIST SYS: 60 CM/S
RIGHT CCA PROX DIAS: 12 CM/S
RIGHT CCA PROX SYS: 69 CM/S
RIGHT ECA DIAS: 4 CM/S
RIGHT ECA SYS: 92 CM/S
RIGHT ICA DIST DIAS: 14 CM/S
RIGHT ICA DIST SYS: 57 CM/S
RIGHT ICA MID DIAS: 7 CM/S
RIGHT ICA MID SYS: 34 CM/S
RIGHT ICA PROX DIAS: 4 CM/S
RIGHT ICA PROX SYS: 40 CM/S
RIGHT VERTEBRAL DIAS: 0 CM/S
RIGHT VERTEBRAL SYS: 32 CM/S

## 2023-12-03 ENCOUNTER — PATIENT MESSAGE (OUTPATIENT)
Dept: URGENT CARE | Facility: CLINIC | Age: 63
End: 2023-12-03
Payer: MEDICARE

## 2023-12-03 ENCOUNTER — PATIENT MESSAGE (OUTPATIENT)
Dept: INFECTIOUS DISEASES | Facility: CLINIC | Age: 63
End: 2023-12-03
Payer: MEDICARE

## 2024-04-22 ENCOUNTER — HOSPITAL ENCOUNTER (EMERGENCY)
Facility: HOSPITAL | Age: 64
Discharge: HOME | End: 2024-04-22
Attending: STUDENT IN AN ORGANIZED HEALTH CARE EDUCATION/TRAINING PROGRAM
Payer: MEDICARE

## 2024-04-22 VITALS
BODY MASS INDEX: 25.06 KG/M2 | WEIGHT: 185 LBS | OXYGEN SATURATION: 95 % | SYSTOLIC BLOOD PRESSURE: 116 MMHG | HEART RATE: 58 BPM | TEMPERATURE: 98 F | DIASTOLIC BLOOD PRESSURE: 71 MMHG | RESPIRATION RATE: 19 BRPM | HEIGHT: 72 IN

## 2024-04-22 DIAGNOSIS — M79.671 FOOT PAIN, RIGHT: ICD-10-CM

## 2024-04-22 DIAGNOSIS — M79.606 LEG PAIN: ICD-10-CM

## 2024-04-22 DIAGNOSIS — M79.89 FOOT SWELLING: Primary | ICD-10-CM

## 2024-04-22 LAB
ALBUMIN SERPL-MCNC: 3.2 G/DL (ref 3.4–4.8)
ALBUMIN/GLOB SERPL: 1 RATIO (ref 1.1–2)
ALP SERPL-CCNC: 105 UNIT/L (ref 40–150)
ALT SERPL-CCNC: 20 UNIT/L (ref 0–55)
AST SERPL-CCNC: 21 UNIT/L (ref 5–34)
BASOPHILS # BLD AUTO: 0.07 X10(3)/MCL
BASOPHILS NFR BLD AUTO: 1.6 %
BILIRUB SERPL-MCNC: 0.6 MG/DL
BUN SERPL-MCNC: 19 MG/DL (ref 8.4–25.7)
CALCIUM SERPL-MCNC: 8.8 MG/DL (ref 8.8–10)
CHLORIDE SERPL-SCNC: 101 MMOL/L (ref 98–107)
CO2 SERPL-SCNC: 30 MMOL/L (ref 23–31)
CREAT SERPL-MCNC: 0.93 MG/DL (ref 0.73–1.18)
EOSINOPHIL # BLD AUTO: 0.08 X10(3)/MCL (ref 0–0.9)
EOSINOPHIL NFR BLD AUTO: 1.8 %
ERYTHROCYTE [DISTWIDTH] IN BLOOD BY AUTOMATED COUNT: 13.2 % (ref 11.5–17)
GFR SERPLBLD CREATININE-BSD FMLA CKD-EPI: >60 MLS/MIN/1.73/M2
GLOBULIN SER-MCNC: 3.1 GM/DL (ref 2.4–3.5)
GLUCOSE SERPL-MCNC: 242 MG/DL (ref 82–115)
HCT VFR BLD AUTO: 45.9 % (ref 42–52)
HGB BLD-MCNC: 16.1 G/DL (ref 14–18)
IMM GRANULOCYTES # BLD AUTO: 0.03 X10(3)/MCL (ref 0–0.04)
IMM GRANULOCYTES NFR BLD AUTO: 0.7 %
LACTATE SERPL-SCNC: 1.3 MMOL/L (ref 0.5–2.2)
LYMPHOCYTES # BLD AUTO: 1.46 X10(3)/MCL (ref 0.6–4.6)
LYMPHOCYTES NFR BLD AUTO: 33 %
MCH RBC QN AUTO: 33.6 PG (ref 27–31)
MCHC RBC AUTO-ENTMCNC: 35.1 G/DL (ref 33–36)
MCV RBC AUTO: 95.8 FL (ref 80–94)
MONOCYTES # BLD AUTO: 0.57 X10(3)/MCL (ref 0.1–1.3)
MONOCYTES NFR BLD AUTO: 12.9 %
NEUTROPHILS # BLD AUTO: 2.21 X10(3)/MCL (ref 2.1–9.2)
NEUTROPHILS NFR BLD AUTO: 50 %
NRBC BLD AUTO-RTO: 0 %
PLATELET # BLD AUTO: 121 X10(3)/MCL (ref 130–400)
PLATELETS.RETICULATED NFR BLD AUTO: 7.7 % (ref 0.9–11.2)
PMV BLD AUTO: 12.2 FL (ref 7.4–10.4)
POTASSIUM SERPL-SCNC: 4.4 MMOL/L (ref 3.5–5.1)
PROT SERPL-MCNC: 6.3 GM/DL (ref 5.8–7.6)
RBC # BLD AUTO: 4.79 X10(6)/MCL (ref 4.7–6.1)
SODIUM SERPL-SCNC: 138 MMOL/L (ref 136–145)
WBC # SPEC AUTO: 4.42 X10(3)/MCL (ref 4.5–11.5)

## 2024-04-22 PROCEDURE — 80053 COMPREHEN METABOLIC PANEL: CPT | Performed by: NURSE PRACTITIONER

## 2024-04-22 PROCEDURE — 99284 EMERGENCY DEPT VISIT MOD MDM: CPT | Mod: 25

## 2024-04-22 PROCEDURE — 87040 BLOOD CULTURE FOR BACTERIA: CPT | Performed by: NURSE PRACTITIONER

## 2024-04-22 PROCEDURE — 85025 COMPLETE CBC W/AUTO DIFF WBC: CPT | Performed by: NURSE PRACTITIONER

## 2024-04-22 PROCEDURE — 63600175 PHARM REV CODE 636 W HCPCS: Mod: JZ,JG

## 2024-04-22 PROCEDURE — 63600175 PHARM REV CODE 636 W HCPCS: Performed by: NURSE PRACTITIONER

## 2024-04-22 PROCEDURE — 83605 ASSAY OF LACTIC ACID: CPT | Performed by: NURSE PRACTITIONER

## 2024-04-22 PROCEDURE — 96375 TX/PRO/DX INJ NEW DRUG ADDON: CPT

## 2024-04-22 PROCEDURE — 96374 THER/PROPH/DIAG INJ IV PUSH: CPT

## 2024-04-22 RX ORDER — HYDROCODONE BITARTRATE AND ACETAMINOPHEN 5; 325 MG/1; MG/1
1 TABLET ORAL EVERY 8 HOURS PRN
Qty: 15 TABLET | Refills: 0 | Status: SHIPPED | OUTPATIENT
Start: 2024-04-22 | End: 2024-04-27

## 2024-04-22 RX ORDER — ONDANSETRON 4 MG/1
4 TABLET, ORALLY DISINTEGRATING ORAL
Status: DISCONTINUED | OUTPATIENT
Start: 2024-04-22 | End: 2024-04-22

## 2024-04-22 RX ORDER — ONDANSETRON HYDROCHLORIDE 2 MG/ML
4 INJECTION, SOLUTION INTRAVENOUS
Status: COMPLETED | OUTPATIENT
Start: 2024-04-22 | End: 2024-04-22

## 2024-04-22 RX ORDER — MORPHINE SULFATE 4 MG/ML
4 INJECTION, SOLUTION INTRAMUSCULAR; INTRAVENOUS
Status: COMPLETED | OUTPATIENT
Start: 2024-04-22 | End: 2024-04-22

## 2024-04-22 RX ADMIN — MORPHINE SULFATE 4 MG: 4 INJECTION, SOLUTION INTRAMUSCULAR; INTRAVENOUS at 06:04

## 2024-04-22 RX ADMIN — ONDANSETRON 4 MG: 2 INJECTION INTRAMUSCULAR; INTRAVENOUS at 07:04

## 2024-04-22 NOTE — FIRST PROVIDER EVALUATION
Medical screening examination initiated.  I have conducted a focused provider triage encounter, findings are as follows:    Brief history of present illness:  Patient states right foot pain and swelling. States a hx. Of Osteomyelitis     Vitals:    04/22/24 1257   BP: 129/76   Pulse: 64   Resp: 16   Temp: 97.7 °F (36.5 °C)   TempSrc: Oral   SpO2: 98%   Weight: 83.9 kg (185 lb)   Height: 6' (1.829 m)       Pertinent physical exam:  Awake, alert    Brief workup plan:  Labs, Imaging    Preliminary workup initiated; this workup will be continued and followed by the physician or advanced practice provider that is assigned to the patient when roomed.

## 2024-04-23 LAB
RIGHT ABI: 1.13
RIGHT ARM BP: 136 MMHG
RIGHT DORSALIS PEDIS: 154 MMHG
RIGHT POSTERIOR TIBIAL: 147 MMHG
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
## 2024-04-27 LAB
BACTERIA BLD CULT: NORMAL
BACTERIA BLD CULT: NORMAL

## 2024-05-13 ENCOUNTER — HOSPITAL ENCOUNTER (EMERGENCY)
Facility: HOSPITAL | Age: 64
Discharge: HOME | End: 2024-05-13
Attending: EMERGENCY MEDICINE
Payer: MEDICARE

## 2024-05-13 VITALS
HEART RATE: 62 BPM | HEIGHT: 72 IN | DIASTOLIC BLOOD PRESSURE: 68 MMHG | WEIGHT: 190 LBS | OXYGEN SATURATION: 99 % | RESPIRATION RATE: 18 BRPM | BODY MASS INDEX: 25.73 KG/M2 | SYSTOLIC BLOOD PRESSURE: 106 MMHG | TEMPERATURE: 98 F

## 2024-05-13 DIAGNOSIS — J06.9 VIRAL URI WITH COUGH: ICD-10-CM

## 2024-05-13 DIAGNOSIS — J40 BRONCHITIS: Primary | ICD-10-CM

## 2024-05-13 DIAGNOSIS — J34.0 CELLULITIS OF NOSE: ICD-10-CM

## 2024-05-13 LAB
FLUAV AG UPPER RESP QL IA.RAPID: NOT DETECTED
FLUBV AG UPPER RESP QL IA.RAPID: NOT DETECTED
SARS-COV-2 RNA RESP QL NAA+PROBE: NOT DETECTED

## 2024-05-13 PROCEDURE — 25000242 PHARM REV CODE 250 ALT 637 W/ HCPCS: Performed by: PHYSICIAN ASSISTANT

## 2024-05-13 PROCEDURE — 99900035 HC TECH TIME PER 15 MIN (STAT)

## 2024-05-13 PROCEDURE — 94760 N-INVAS EAR/PLS OXIMETRY 1: CPT

## 2024-05-13 PROCEDURE — 0240U COVID/FLU A&B PCR: CPT | Performed by: PHYSICIAN ASSISTANT

## 2024-05-13 PROCEDURE — 94640 AIRWAY INHALATION TREATMENT: CPT

## 2024-05-13 PROCEDURE — 99284 EMERGENCY DEPT VISIT MOD MDM: CPT | Mod: 25

## 2024-05-13 RX ORDER — IPRATROPIUM BROMIDE AND ALBUTEROL SULFATE 2.5; .5 MG/3ML; MG/3ML
3 SOLUTION RESPIRATORY (INHALATION)
Status: COMPLETED | OUTPATIENT
Start: 2024-05-13 | End: 2024-05-13

## 2024-05-13 RX ORDER — ALBUTEROL SULFATE 90 UG/1
1-2 AEROSOL, METERED RESPIRATORY (INHALATION) EVERY 6 HOURS PRN
Qty: 6.7 G | Refills: 0 | Status: SHIPPED | OUTPATIENT
Start: 2024-05-13

## 2024-05-13 RX ORDER — CLINDAMYCIN HYDROCHLORIDE 150 MG/1
450 CAPSULE ORAL EVERY 8 HOURS
Qty: 63 CAPSULE | Refills: 0 | Status: SHIPPED | OUTPATIENT
Start: 2024-05-13 | End: 2024-05-20

## 2024-05-13 RX ORDER — MUPIROCIN 20 MG/G
OINTMENT TOPICAL 3 TIMES DAILY
Qty: 15 G | Refills: 0 | Status: SHIPPED | OUTPATIENT
Start: 2024-05-13

## 2024-05-13 RX ADMIN — IPRATROPIUM BROMIDE AND ALBUTEROL SULFATE 3 ML: .5; 3 SOLUTION RESPIRATORY (INHALATION) at 04:05
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
## 2024-05-14 ENCOUNTER — HOSPITAL ENCOUNTER (EMERGENCY)
Facility: HOSPITAL | Age: 64
Discharge: PSYCHIATRIC OR SUBSTANCE USE FACILITY | End: 2024-05-14
Attending: EMERGENCY MEDICINE
Payer: MEDICARE

## 2024-05-14 VITALS
HEART RATE: 75 BPM | OXYGEN SATURATION: 98 % | HEIGHT: 72 IN | SYSTOLIC BLOOD PRESSURE: 107 MMHG | BODY MASS INDEX: 23.7 KG/M2 | TEMPERATURE: 98 F | WEIGHT: 175 LBS | DIASTOLIC BLOOD PRESSURE: 61 MMHG | RESPIRATION RATE: 18 BRPM

## 2024-05-14 DIAGNOSIS — Z59.00 HOMELESSNESS: ICD-10-CM

## 2024-05-14 DIAGNOSIS — R45.851 DEPRESSION WITH SUICIDAL IDEATION: Primary | ICD-10-CM

## 2024-05-14 DIAGNOSIS — F32.A DEPRESSION WITH SUICIDAL IDEATION: Primary | ICD-10-CM

## 2024-05-14 LAB
ALBUMIN SERPL-MCNC: 3.1 G/DL (ref 3.4–4.8)
ALBUMIN/GLOB SERPL: 0.8 RATIO (ref 1.1–2)
ALP SERPL-CCNC: 86 UNIT/L (ref 40–150)
ALT SERPL-CCNC: 14 UNIT/L (ref 0–55)
AMPHET UR QL SCN: POSITIVE
APAP SERPL-MCNC: <3 UG/ML (ref 10–30)
AST SERPL-CCNC: 17 UNIT/L (ref 5–34)
BACTERIA #/AREA URNS AUTO: ABNORMAL /HPF
BARBITURATE SCN PRESENT UR: NEGATIVE
BASOPHILS # BLD AUTO: 0.05 X10(3)/MCL
BASOPHILS NFR BLD AUTO: 0.5 %
BENZODIAZ UR QL SCN: NEGATIVE
BILIRUB SERPL-MCNC: 0.9 MG/DL
BILIRUB UR QL STRIP.AUTO: ABNORMAL
BUN SERPL-MCNC: 28.3 MG/DL (ref 8.4–25.7)
CALCIUM SERPL-MCNC: 9.2 MG/DL (ref 8.8–10)
CANNABINOIDS UR QL SCN: NEGATIVE
CHLORIDE SERPL-SCNC: 102 MMOL/L (ref 98–107)
CLARITY UR: ABNORMAL
CO2 SERPL-SCNC: 27 MMOL/L (ref 23–31)
COCAINE UR QL SCN: NEGATIVE
COLOR UR AUTO: ABNORMAL
CREAT SERPL-MCNC: 0.91 MG/DL (ref 0.73–1.18)
EOSINOPHIL # BLD AUTO: 0.1 X10(3)/MCL (ref 0–0.9)
EOSINOPHIL NFR BLD AUTO: 1.1 %
ERYTHROCYTE [DISTWIDTH] IN BLOOD BY AUTOMATED COUNT: 13.6 % (ref 11.5–17)
ETHANOL SERPL-MCNC: <10 MG/DL
FENTANYL UR QL SCN: NEGATIVE
GFR SERPLBLD CREATININE-BSD FMLA CKD-EPI: >60 ML/MIN/1.73/M2
GLOBULIN SER-MCNC: 3.8 GM/DL (ref 2.4–3.5)
GLUCOSE SERPL-MCNC: 175 MG/DL (ref 82–115)
GLUCOSE UR QL STRIP: ABNORMAL
HCT VFR BLD AUTO: 43.8 % (ref 42–52)
HGB BLD-MCNC: 14.8 G/DL (ref 14–18)
HGB UR QL STRIP: NEGATIVE
HYALINE CASTS #/AREA URNS LPF: >20 /LPF
IMM GRANULOCYTES # BLD AUTO: 0.04 X10(3)/MCL (ref 0–0.04)
IMM GRANULOCYTES NFR BLD AUTO: 0.4 %
KETONES UR QL STRIP: ABNORMAL
LEUKOCYTE ESTERASE UR QL STRIP: NEGATIVE
LYMPHOCYTES # BLD AUTO: 2.16 X10(3)/MCL (ref 0.6–4.6)
LYMPHOCYTES NFR BLD AUTO: 23.1 %
MCH RBC QN AUTO: 33.1 PG (ref 27–31)
MCHC RBC AUTO-ENTMCNC: 33.8 G/DL (ref 33–36)
MCV RBC AUTO: 98 FL (ref 80–94)
MDMA UR QL SCN: NEGATIVE
MONOCYTES # BLD AUTO: 0.97 X10(3)/MCL (ref 0.1–1.3)
MONOCYTES NFR BLD AUTO: 10.4 %
MUCOUS THREADS URNS QL MICRO: ABNORMAL /LPF
NEUTROPHILS # BLD AUTO: 6.03 X10(3)/MCL (ref 2.1–9.2)
NEUTROPHILS NFR BLD AUTO: 64.5 %
NITRITE UR QL STRIP: NEGATIVE
NRBC BLD AUTO-RTO: 0 %
OPIATES UR QL SCN: NEGATIVE
PCP UR QL: NEGATIVE
PH UR STRIP: 5.5 [PH]
PH UR: 5.5 [PH] (ref 3–11)
PLATELET # BLD AUTO: 151 X10(3)/MCL (ref 130–400)
PMV BLD AUTO: 10.6 FL (ref 7.4–10.4)
POTASSIUM SERPL-SCNC: 3.8 MMOL/L (ref 3.5–5.1)
PROT SERPL-MCNC: 6.9 GM/DL (ref 5.8–7.6)
PROT UR QL STRIP: ABNORMAL
RBC # BLD AUTO: 4.47 X10(6)/MCL (ref 4.7–6.1)
RBC #/AREA URNS AUTO: ABNORMAL /HPF
SODIUM SERPL-SCNC: 138 MMOL/L (ref 136–145)
SP GR UR STRIP.AUTO: 1.03 (ref 1–1.03)
SPECIFIC GRAVITY, URINE AUTO (.000) (OHS): 1.03 (ref 1–1.03)
SQUAMOUS #/AREA URNS LPF: ABNORMAL /HPF
TSH SERPL-ACNC: 1.28 UIU/ML (ref 0.35–4.94)
UROBILINOGEN UR STRIP-ACNC: 2
WBC # SPEC AUTO: 9.35 X10(3)/MCL (ref 4.5–11.5)
WBC #/AREA URNS AUTO: ABNORMAL /HPF

## 2024-05-14 PROCEDURE — 84443 ASSAY THYROID STIM HORMONE: CPT | Performed by: EMERGENCY MEDICINE

## 2024-05-14 PROCEDURE — 80143 DRUG ASSAY ACETAMINOPHEN: CPT | Performed by: EMERGENCY MEDICINE

## 2024-05-14 PROCEDURE — 80307 DRUG TEST PRSMV CHEM ANLYZR: CPT | Performed by: EMERGENCY MEDICINE

## 2024-05-14 PROCEDURE — 85025 COMPLETE CBC W/AUTO DIFF WBC: CPT | Performed by: EMERGENCY MEDICINE

## 2024-05-14 PROCEDURE — 80053 COMPREHEN METABOLIC PANEL: CPT | Performed by: EMERGENCY MEDICINE

## 2024-05-14 PROCEDURE — 82077 ASSAY SPEC XCP UR&BREATH IA: CPT | Performed by: EMERGENCY MEDICINE

## 2024-05-14 PROCEDURE — 81001 URINALYSIS AUTO W/SCOPE: CPT | Mod: XB | Performed by: EMERGENCY MEDICINE

## 2024-05-14 PROCEDURE — 99285 EMERGENCY DEPT VISIT HI MDM: CPT

## 2024-05-14 RX ORDER — LORAZEPAM 1 MG/1
2 TABLET ORAL EVERY 8 HOURS PRN
Status: DISCONTINUED | OUTPATIENT
Start: 2024-05-14 | End: 2024-05-14 | Stop reason: HOSPADM

## 2024-05-14 RX ORDER — OLANZAPINE 5 MG/1
10 TABLET, ORALLY DISINTEGRATING ORAL EVERY 8 HOURS PRN
Status: DISCONTINUED | OUTPATIENT
Start: 2024-05-14 | End: 2024-05-14 | Stop reason: HOSPADM

## 2024-05-14 RX ORDER — DIPHENHYDRAMINE HYDROCHLORIDE 50 MG/ML
50 INJECTION INTRAMUSCULAR; INTRAVENOUS EVERY 4 HOURS PRN
Status: DISCONTINUED | OUTPATIENT
Start: 2024-05-14 | End: 2024-05-14 | Stop reason: HOSPADM

## 2024-05-14 RX ORDER — HALOPERIDOL 5 MG/ML
5 INJECTION INTRAMUSCULAR EVERY 4 HOURS PRN
Status: DISCONTINUED | OUTPATIENT
Start: 2024-05-14 | End: 2024-05-14 | Stop reason: HOSPADM

## 2024-05-14 RX ORDER — ZOLPIDEM TARTRATE 5 MG/1
5 TABLET ORAL NIGHTLY PRN
Status: DISCONTINUED | OUTPATIENT
Start: 2024-05-14 | End: 2024-05-14 | Stop reason: HOSPADM

## 2024-05-14 RX ORDER — ALUMINUM HYDROXIDE, MAGNESIUM HYDROXIDE, AND SIMETHICONE 1200; 120; 1200 MG/30ML; MG/30ML; MG/30ML
30 SUSPENSION ORAL EVERY 6 HOURS PRN
Status: DISCONTINUED | OUTPATIENT
Start: 2024-05-14 | End: 2024-05-14 | Stop reason: HOSPADM

## 2024-05-14 RX ORDER — HALOPERIDOL 5 MG/1
5 TABLET ORAL EVERY 4 HOURS PRN
Status: DISCONTINUED | OUTPATIENT
Start: 2024-05-14 | End: 2024-05-14 | Stop reason: HOSPADM

## 2024-05-14 RX ORDER — ACETAMINOPHEN 325 MG/1
650 TABLET ORAL EVERY 6 HOURS PRN
Status: DISCONTINUED | OUTPATIENT
Start: 2024-05-14 | End: 2024-05-14 | Stop reason: HOSPADM

## 2024-05-14 RX ORDER — DIPHENHYDRAMINE HCL 25 MG
50 CAPSULE ORAL EVERY 4 HOURS PRN
Status: DISCONTINUED | OUTPATIENT
Start: 2024-05-14 | End: 2024-05-14 | Stop reason: HOSPADM

## 2024-05-14 SDOH — SOCIAL DETERMINANTS OF HEALTH (SDOH): HOMELESSNESS UNSPECIFIED: Z59.00
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
## 2024-05-24 ENCOUNTER — HOSPITAL ENCOUNTER (EMERGENCY)
Facility: HOSPITAL | Age: 64
Discharge: PSYCHIATRIC OR SUBSTANCE USE FACILITY | End: 2024-05-24
Attending: EMERGENCY MEDICINE
Payer: MEDICARE

## 2024-05-24 VITALS
SYSTOLIC BLOOD PRESSURE: 132 MMHG | HEIGHT: 72 IN | DIASTOLIC BLOOD PRESSURE: 66 MMHG | RESPIRATION RATE: 18 BRPM | OXYGEN SATURATION: 98 % | WEIGHT: 200 LBS | BODY MASS INDEX: 27.09 KG/M2 | TEMPERATURE: 97 F | HEART RATE: 65 BPM

## 2024-05-24 DIAGNOSIS — R45.851 SUICIDAL IDEATION: Primary | ICD-10-CM

## 2024-05-24 LAB
ALBUMIN SERPL-MCNC: 3.1 G/DL (ref 3.4–4.8)
ALBUMIN/GLOB SERPL: 1 RATIO (ref 1.1–2)
ALP SERPL-CCNC: 72 UNIT/L (ref 40–150)
ALT SERPL-CCNC: 19 UNIT/L (ref 0–55)
AMPHET UR QL SCN: NEGATIVE
ANION GAP SERPL CALC-SCNC: 5 MEQ/L
APAP SERPL-MCNC: <3 UG/ML (ref 10–30)
AST SERPL-CCNC: 26 UNIT/L (ref 5–34)
BACTERIA #/AREA URNS AUTO: ABNORMAL /HPF
BARBITURATE SCN PRESENT UR: NEGATIVE
BASOPHILS # BLD AUTO: 0.07 X10(3)/MCL
BASOPHILS NFR BLD AUTO: 1.1 %
BENZODIAZ UR QL SCN: NEGATIVE
BILIRUB SERPL-MCNC: 0.8 MG/DL
BILIRUB UR QL STRIP.AUTO: NEGATIVE
BUN SERPL-MCNC: 26.7 MG/DL (ref 8.4–25.7)
CALCIUM SERPL-MCNC: 9 MG/DL (ref 8.8–10)
CANNABINOIDS UR QL SCN: NEGATIVE
CHLORIDE SERPL-SCNC: 102 MMOL/L (ref 98–107)
CLARITY UR: CLEAR
CO2 SERPL-SCNC: 31 MMOL/L (ref 23–31)
COCAINE UR QL SCN: NEGATIVE
COLOR UR AUTO: YELLOW
CREAT SERPL-MCNC: 0.83 MG/DL (ref 0.73–1.18)
CREAT/UREA NIT SERPL: 32
EOSINOPHIL # BLD AUTO: 0.14 X10(3)/MCL (ref 0–0.9)
EOSINOPHIL NFR BLD AUTO: 2.3 %
ERYTHROCYTE [DISTWIDTH] IN BLOOD BY AUTOMATED COUNT: 13.6 % (ref 11.5–17)
ETHANOL SERPL-MCNC: <10 MG/DL
FENTANYL UR QL SCN: NEGATIVE
GFR SERPLBLD CREATININE-BSD FMLA CKD-EPI: >60 ML/MIN/1.73/M2
GLOBULIN SER-MCNC: 3.2 GM/DL (ref 2.4–3.5)
GLUCOSE SERPL-MCNC: 93 MG/DL (ref 82–115)
GLUCOSE UR QL STRIP: NORMAL
HCT VFR BLD AUTO: 42.8 % (ref 42–52)
HGB BLD-MCNC: 15 G/DL (ref 14–18)
HGB UR QL STRIP: NEGATIVE
IMM GRANULOCYTES # BLD AUTO: 0.03 X10(3)/MCL (ref 0–0.04)
IMM GRANULOCYTES NFR BLD AUTO: 0.5 %
KETONES UR QL STRIP: ABNORMAL
LEUKOCYTE ESTERASE UR QL STRIP: NEGATIVE
LYMPHOCYTES # BLD AUTO: 1.55 X10(3)/MCL (ref 0.6–4.6)
LYMPHOCYTES NFR BLD AUTO: 25 %
MCH RBC QN AUTO: 33.9 PG (ref 27–31)
MCHC RBC AUTO-ENTMCNC: 35 G/DL (ref 33–36)
MCV RBC AUTO: 96.8 FL (ref 80–94)
MDMA UR QL SCN: NEGATIVE
MONOCYTES # BLD AUTO: 0.58 X10(3)/MCL (ref 0.1–1.3)
MONOCYTES NFR BLD AUTO: 9.4 %
MUCOUS THREADS URNS QL MICRO: ABNORMAL /LPF
NEUTROPHILS # BLD AUTO: 3.82 X10(3)/MCL (ref 2.1–9.2)
NEUTROPHILS NFR BLD AUTO: 61.7 %
NITRITE UR QL STRIP: NEGATIVE
NRBC BLD AUTO-RTO: 0 %
OPIATES UR QL SCN: NEGATIVE
PCP UR QL: NEGATIVE
PH UR STRIP: 6.5 [PH]
PH UR: 6.5 [PH] (ref 3–11)
PLATELET # BLD AUTO: 144 X10(3)/MCL (ref 130–400)
PMV BLD AUTO: 10.8 FL (ref 7.4–10.4)
POTASSIUM SERPL-SCNC: 4.3 MMOL/L (ref 3.5–5.1)
PROT SERPL-MCNC: 6.3 GM/DL (ref 5.8–7.6)
PROT UR QL STRIP: ABNORMAL
RBC # BLD AUTO: 4.42 X10(6)/MCL (ref 4.7–6.1)
RBC #/AREA URNS AUTO: ABNORMAL /HPF
SALICYLATES SERPL-MCNC: <5 MG/DL (ref 15–30)
SODIUM SERPL-SCNC: 138 MMOL/L (ref 136–145)
SP GR UR STRIP.AUTO: 1.02 (ref 1–1.03)
SPECIFIC GRAVITY, URINE AUTO (.000) (OHS): 1.02 (ref 1–1.03)
SQUAMOUS #/AREA URNS LPF: ABNORMAL /HPF
TSH SERPL-ACNC: 1.15 UIU/ML (ref 0.35–4.94)
UROBILINOGEN UR STRIP-ACNC: NORMAL
WBC # SPEC AUTO: 6.19 X10(3)/MCL (ref 4.5–11.5)
WBC #/AREA URNS AUTO: ABNORMAL /HPF

## 2024-05-24 PROCEDURE — 84443 ASSAY THYROID STIM HORMONE: CPT | Performed by: EMERGENCY MEDICINE

## 2024-05-24 PROCEDURE — 82077 ASSAY SPEC XCP UR&BREATH IA: CPT | Performed by: EMERGENCY MEDICINE

## 2024-05-24 PROCEDURE — 85025 COMPLETE CBC W/AUTO DIFF WBC: CPT | Performed by: EMERGENCY MEDICINE

## 2024-05-24 PROCEDURE — 80143 DRUG ASSAY ACETAMINOPHEN: CPT | Performed by: EMERGENCY MEDICINE

## 2024-05-24 PROCEDURE — 80179 DRUG ASSAY SALICYLATE: CPT | Performed by: EMERGENCY MEDICINE

## 2024-05-24 PROCEDURE — 81001 URINALYSIS AUTO W/SCOPE: CPT | Performed by: EMERGENCY MEDICINE

## 2024-05-24 PROCEDURE — 99285 EMERGENCY DEPT VISIT HI MDM: CPT

## 2024-05-24 PROCEDURE — 80053 COMPREHEN METABOLIC PANEL: CPT | Performed by: EMERGENCY MEDICINE

## 2024-05-24 PROCEDURE — 80307 DRUG TEST PRSMV CHEM ANLYZR: CPT | Performed by: EMERGENCY MEDICINE
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
## 2024-06-01 ENCOUNTER — HOSPITAL ENCOUNTER (EMERGENCY)
Facility: HOSPITAL | Age: 64
Discharge: HOME | End: 2024-06-02
Attending: STUDENT IN AN ORGANIZED HEALTH CARE EDUCATION/TRAINING PROGRAM
Payer: MEDICARE

## 2024-06-01 DIAGNOSIS — Z00.8 MEDICAL CLEARANCE FOR PSYCHIATRIC ADMISSION: ICD-10-CM

## 2024-06-01 DIAGNOSIS — F32.A DEPRESSION WITH SUICIDAL IDEATION: Primary | ICD-10-CM

## 2024-06-01 DIAGNOSIS — R45.851 DEPRESSION WITH SUICIDAL IDEATION: Primary | ICD-10-CM

## 2024-06-01 LAB
ALBUMIN SERPL-MCNC: 3.4 G/DL (ref 3.4–4.8)
ALBUMIN/GLOB SERPL: 1 RATIO (ref 1.1–2)
ALP SERPL-CCNC: 65 UNIT/L (ref 40–150)
ALT SERPL-CCNC: 20 UNIT/L (ref 0–55)
AMPHET UR QL SCN: NEGATIVE
ANION GAP SERPL CALC-SCNC: 8 MEQ/L
APAP SERPL-MCNC: <3 UG/ML (ref 10–30)
AST SERPL-CCNC: 27 UNIT/L (ref 5–34)
BACTERIA #/AREA URNS AUTO: ABNORMAL /HPF
BARBITURATE SCN PRESENT UR: NEGATIVE
BASOPHILS # BLD AUTO: 0.07 X10(3)/MCL
BASOPHILS NFR BLD AUTO: 0.9 %
BENZODIAZ UR QL SCN: NEGATIVE
BILIRUB SERPL-MCNC: 0.5 MG/DL
BILIRUB UR QL STRIP.AUTO: NEGATIVE
BUN SERPL-MCNC: 30 MG/DL (ref 8.4–25.7)
CALCIUM SERPL-MCNC: 9.3 MG/DL (ref 8.8–10)
CANNABINOIDS UR QL SCN: NEGATIVE
CAOX CRY UR QL COMP ASSIST: ABNORMAL
CHLORIDE SERPL-SCNC: 104 MMOL/L (ref 98–107)
CLARITY UR: CLEAR
CO2 SERPL-SCNC: 28 MMOL/L (ref 23–31)
COCAINE UR QL SCN: NEGATIVE
COLOR UR AUTO: YELLOW
CREAT SERPL-MCNC: 0.86 MG/DL (ref 0.73–1.18)
CREAT/UREA NIT SERPL: 35
EOSINOPHIL # BLD AUTO: 0.15 X10(3)/MCL (ref 0–0.9)
EOSINOPHIL NFR BLD AUTO: 1.9 %
ERYTHROCYTE [DISTWIDTH] IN BLOOD BY AUTOMATED COUNT: 13.9 % (ref 11.5–17)
ETHANOL SERPL-MCNC: <10 MG/DL
FENTANYL UR QL SCN: NEGATIVE
GFR SERPLBLD CREATININE-BSD FMLA CKD-EPI: >60 ML/MIN/1.73/M2
GLOBULIN SER-MCNC: 3.4 GM/DL (ref 2.4–3.5)
GLUCOSE SERPL-MCNC: 105 MG/DL (ref 82–115)
GLUCOSE UR QL STRIP: ABNORMAL
HCT VFR BLD AUTO: 44.4 % (ref 42–52)
HGB BLD-MCNC: 15.4 G/DL (ref 14–18)
HGB UR QL STRIP: NEGATIVE
IMM GRANULOCYTES # BLD AUTO: 0.02 X10(3)/MCL (ref 0–0.04)
IMM GRANULOCYTES NFR BLD AUTO: 0.3 %
KETONES UR QL STRIP: ABNORMAL
LEUKOCYTE ESTERASE UR QL STRIP: 25
LYMPHOCYTES # BLD AUTO: 2.5 X10(3)/MCL (ref 0.6–4.6)
LYMPHOCYTES NFR BLD AUTO: 32.3 %
MCH RBC QN AUTO: 33.8 PG (ref 27–31)
MCHC RBC AUTO-ENTMCNC: 34.7 G/DL (ref 33–36)
MCV RBC AUTO: 97.6 FL (ref 80–94)
MDMA UR QL SCN: NEGATIVE
MONOCYTES # BLD AUTO: 0.7 X10(3)/MCL (ref 0.1–1.3)
MONOCYTES NFR BLD AUTO: 9 %
MUCOUS THREADS URNS QL MICRO: ABNORMAL /LPF
NEUTROPHILS # BLD AUTO: 4.3 X10(3)/MCL (ref 2.1–9.2)
NEUTROPHILS NFR BLD AUTO: 55.6 %
NITRITE UR QL STRIP: NEGATIVE
NRBC BLD AUTO-RTO: 0 %
OPIATES UR QL SCN: NEGATIVE
PCP UR QL: NEGATIVE
PH UR STRIP: 6 [PH]
PH UR: 6 [PH] (ref 3–11)
PLATELET # BLD AUTO: 138 X10(3)/MCL (ref 130–400)
PLATELETS.RETICULATED NFR BLD AUTO: 3.7 % (ref 0.9–11.2)
PMV BLD AUTO: 11.4 FL (ref 7.4–10.4)
POTASSIUM SERPL-SCNC: 4.5 MMOL/L (ref 3.5–5.1)
PROT SERPL-MCNC: 6.8 GM/DL (ref 5.8–7.6)
PROT UR QL STRIP: ABNORMAL
RBC # BLD AUTO: 4.55 X10(6)/MCL (ref 4.7–6.1)
RBC #/AREA URNS AUTO: ABNORMAL /HPF
SARS-COV-2 RDRP RESP QL NAA+PROBE: NEGATIVE
SODIUM SERPL-SCNC: 140 MMOL/L (ref 136–145)
SP GR UR STRIP.AUTO: 1.03 (ref 1–1.03)
SPECIFIC GRAVITY, URINE AUTO (.000) (OHS): 1.03 (ref 1–1.03)
SQUAMOUS #/AREA URNS LPF: ABNORMAL /HPF
TSH SERPL-ACNC: 1.79 UIU/ML (ref 0.35–4.94)
UROBILINOGEN UR STRIP-ACNC: NORMAL
WBC # SPEC AUTO: 7.74 X10(3)/MCL (ref 4.5–11.5)
WBC #/AREA URNS AUTO: ABNORMAL /HPF

## 2024-06-01 PROCEDURE — 99285 EMERGENCY DEPT VISIT HI MDM: CPT

## 2024-06-01 PROCEDURE — U0002 COVID-19 LAB TEST NON-CDC: HCPCS | Performed by: STUDENT IN AN ORGANIZED HEALTH CARE EDUCATION/TRAINING PROGRAM

## 2024-06-01 PROCEDURE — 81001 URINALYSIS AUTO W/SCOPE: CPT | Mod: XB | Performed by: STUDENT IN AN ORGANIZED HEALTH CARE EDUCATION/TRAINING PROGRAM

## 2024-06-01 PROCEDURE — 80307 DRUG TEST PRSMV CHEM ANLYZR: CPT | Performed by: STUDENT IN AN ORGANIZED HEALTH CARE EDUCATION/TRAINING PROGRAM

## 2024-06-01 PROCEDURE — 82077 ASSAY SPEC XCP UR&BREATH IA: CPT | Performed by: STUDENT IN AN ORGANIZED HEALTH CARE EDUCATION/TRAINING PROGRAM

## 2024-06-01 PROCEDURE — 85025 COMPLETE CBC W/AUTO DIFF WBC: CPT | Performed by: STUDENT IN AN ORGANIZED HEALTH CARE EDUCATION/TRAINING PROGRAM

## 2024-06-01 PROCEDURE — 84443 ASSAY THYROID STIM HORMONE: CPT | Performed by: STUDENT IN AN ORGANIZED HEALTH CARE EDUCATION/TRAINING PROGRAM

## 2024-06-01 PROCEDURE — 80143 DRUG ASSAY ACETAMINOPHEN: CPT | Performed by: STUDENT IN AN ORGANIZED HEALTH CARE EDUCATION/TRAINING PROGRAM

## 2024-06-01 PROCEDURE — 80053 COMPREHEN METABOLIC PANEL: CPT | Performed by: STUDENT IN AN ORGANIZED HEALTH CARE EDUCATION/TRAINING PROGRAM

## 2024-06-01 RX ORDER — HALOPERIDOL 5 MG/ML
5 INJECTION INTRAMUSCULAR EVERY 4 HOURS PRN
Status: DISCONTINUED | OUTPATIENT
Start: 2024-06-01 | End: 2024-06-02 | Stop reason: HOSPADM

## 2024-06-01 RX ORDER — LORAZEPAM 1 MG/1
2 TABLET ORAL EVERY 4 HOURS PRN
Status: DISCONTINUED | OUTPATIENT
Start: 2024-06-01 | End: 2024-06-02 | Stop reason: HOSPADM

## 2024-06-01 RX ORDER — DIPHENHYDRAMINE HCL 25 MG
50 CAPSULE ORAL EVERY 4 HOURS PRN
Status: DISCONTINUED | OUTPATIENT
Start: 2024-06-01 | End: 2024-06-02 | Stop reason: HOSPADM

## 2024-06-01 RX ORDER — DIPHENHYDRAMINE HYDROCHLORIDE 50 MG/ML
50 INJECTION INTRAMUSCULAR; INTRAVENOUS EVERY 4 HOURS PRN
Status: DISCONTINUED | OUTPATIENT
Start: 2024-06-01 | End: 2024-06-02 | Stop reason: HOSPADM

## 2024-06-01 RX ORDER — HALOPERIDOL 5 MG/1
5 TABLET ORAL EVERY 4 HOURS PRN
Status: DISCONTINUED | OUTPATIENT
Start: 2024-06-01 | End: 2024-06-02 | Stop reason: HOSPADM

## 2024-06-02 VITALS
TEMPERATURE: 98 F | OXYGEN SATURATION: 95 % | DIASTOLIC BLOOD PRESSURE: 74 MMHG | SYSTOLIC BLOOD PRESSURE: 117 MMHG | WEIGHT: 180 LBS | BODY MASS INDEX: 24.41 KG/M2 | HEART RATE: 60 BPM | RESPIRATION RATE: 17 BRPM
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
## 2024-06-30 ENCOUNTER — HOSPITAL ENCOUNTER (EMERGENCY)
Facility: HOSPITAL | Age: 64
Discharge: LEFT BEFORE DISCHARGE | End: 2024-06-30
Payer: MEDICARE

## 2024-06-30 ENCOUNTER — HOSPITAL ENCOUNTER (EMERGENCY)
Facility: HOSPITAL | Age: 64
Discharge: HOME | End: 2024-06-30
Attending: EMERGENCY MEDICINE
Payer: MEDICARE

## 2024-06-30 VITALS
DIASTOLIC BLOOD PRESSURE: 71 MMHG | HEART RATE: 60 BPM | OXYGEN SATURATION: 95 % | RESPIRATION RATE: 18 BRPM | TEMPERATURE: 98 F | SYSTOLIC BLOOD PRESSURE: 125 MMHG

## 2024-06-30 VITALS
WEIGHT: 180 LBS | BODY MASS INDEX: 24.38 KG/M2 | SYSTOLIC BLOOD PRESSURE: 125 MMHG | RESPIRATION RATE: 14 BRPM | HEART RATE: 61 BPM | HEIGHT: 72 IN | OXYGEN SATURATION: 99 % | TEMPERATURE: 99 F | DIASTOLIC BLOOD PRESSURE: 64 MMHG

## 2024-06-30 DIAGNOSIS — R53.1 WEAKNESS: Primary | ICD-10-CM

## 2024-06-30 DIAGNOSIS — T67.9XXA HEAT EXPOSURE, INITIAL ENCOUNTER: ICD-10-CM

## 2024-06-30 DIAGNOSIS — Z59.00 HOMELESSNESS: ICD-10-CM

## 2024-06-30 DIAGNOSIS — E87.6 HYPOKALEMIA: ICD-10-CM

## 2024-06-30 LAB
ANION GAP SERPL CALC-SCNC: 8 MEQ/L
BACTERIA #/AREA URNS AUTO: ABNORMAL /HPF
BASOPHILS # BLD AUTO: 0.07 X10(3)/MCL
BASOPHILS NFR BLD AUTO: 1 %
BILIRUB UR QL STRIP.AUTO: NEGATIVE
BUN SERPL-MCNC: 16.6 MG/DL (ref 8.4–25.7)
CALCIUM SERPL-MCNC: 9 MG/DL (ref 8.8–10)
CHLORIDE SERPL-SCNC: 110 MMOL/L (ref 98–107)
CK SERPL-CCNC: 111 U/L (ref 30–200)
CLARITY UR: CLEAR
CO2 SERPL-SCNC: 29 MMOL/L (ref 23–31)
COLOR UR AUTO: YELLOW
CREAT SERPL-MCNC: 0.85 MG/DL (ref 0.73–1.18)
CREAT/UREA NIT SERPL: 20
EOSINOPHIL # BLD AUTO: 0.16 X10(3)/MCL (ref 0–0.9)
EOSINOPHIL NFR BLD AUTO: 2.3 %
ERYTHROCYTE [DISTWIDTH] IN BLOOD BY AUTOMATED COUNT: 15.3 % (ref 11.5–17)
GFR SERPLBLD CREATININE-BSD FMLA CKD-EPI: >60 ML/MIN/1.73/M2
GLUCOSE SERPL-MCNC: 114 MG/DL (ref 82–115)
GLUCOSE UR QL STRIP: ABNORMAL
HCT VFR BLD AUTO: 39.1 % (ref 42–52)
HGB BLD-MCNC: 13.7 G/DL (ref 14–18)
HGB UR QL STRIP: NEGATIVE
HOLD SPECIMEN: NORMAL
HOLD SPECIMEN: NORMAL
HYALINE CASTS #/AREA URNS LPF: ABNORMAL /LPF
IMM GRANULOCYTES # BLD AUTO: 0.01 X10(3)/MCL (ref 0–0.04)
IMM GRANULOCYTES NFR BLD AUTO: 0.1 %
KETONES UR QL STRIP: NEGATIVE
LEUKOCYTE ESTERASE UR QL STRIP: NEGATIVE
LYMPHOCYTES # BLD AUTO: 2.21 X10(3)/MCL (ref 0.6–4.6)
LYMPHOCYTES NFR BLD AUTO: 32.4 %
MAGNESIUM SERPL-MCNC: 1.7 MG/DL (ref 1.6–2.6)
MCH RBC QN AUTO: 33.8 PG (ref 27–31)
MCHC RBC AUTO-ENTMCNC: 35 G/DL (ref 33–36)
MCV RBC AUTO: 96.5 FL (ref 80–94)
MONOCYTES # BLD AUTO: 0.59 X10(3)/MCL (ref 0.1–1.3)
MONOCYTES NFR BLD AUTO: 8.7 %
MUCOUS THREADS URNS QL MICRO: ABNORMAL /LPF
NEUTROPHILS # BLD AUTO: 3.78 X10(3)/MCL (ref 2.1–9.2)
NEUTROPHILS NFR BLD AUTO: 55.5 %
NITRITE UR QL STRIP: NEGATIVE
NRBC BLD AUTO-RTO: 0 %
PH UR STRIP: 6 [PH]
PLATELET # BLD AUTO: 143 X10(3)/MCL (ref 130–400)
PMV BLD AUTO: 10.9 FL (ref 7.4–10.4)
POCT GLUCOSE: 110 MG/DL (ref 70–110)
POTASSIUM SERPL-SCNC: 2.9 MMOL/L (ref 3.5–5.1)
PROT UR QL STRIP: ABNORMAL
RBC # BLD AUTO: 4.05 X10(6)/MCL (ref 4.7–6.1)
RBC #/AREA URNS AUTO: ABNORMAL /HPF
SODIUM SERPL-SCNC: 147 MMOL/L (ref 136–145)
SP GR UR STRIP.AUTO: 1.03 (ref 1–1.03)
SQUAMOUS #/AREA URNS LPF: ABNORMAL /HPF
UROBILINOGEN UR STRIP-ACNC: ABNORMAL
WBC # BLD AUTO: 6.82 X10(3)/MCL (ref 4.5–11.5)
WBC #/AREA URNS AUTO: ABNORMAL /HPF

## 2024-06-30 PROCEDURE — 82962 GLUCOSE BLOOD TEST: CPT

## 2024-06-30 PROCEDURE — 99284 EMERGENCY DEPT VISIT MOD MDM: CPT

## 2024-06-30 PROCEDURE — 25000003 PHARM REV CODE 250: Performed by: EMERGENCY MEDICINE

## 2024-06-30 PROCEDURE — 81001 URINALYSIS AUTO W/SCOPE: CPT | Performed by: EMERGENCY MEDICINE

## 2024-06-30 PROCEDURE — 85025 COMPLETE CBC W/AUTO DIFF WBC: CPT | Performed by: EMERGENCY MEDICINE

## 2024-06-30 PROCEDURE — 82550 ASSAY OF CK (CPK): CPT | Performed by: EMERGENCY MEDICINE

## 2024-06-30 PROCEDURE — 99282 EMERGENCY DEPT VISIT SF MDM: CPT | Mod: 25,27

## 2024-06-30 PROCEDURE — 83735 ASSAY OF MAGNESIUM: CPT | Performed by: EMERGENCY MEDICINE

## 2024-06-30 PROCEDURE — 96361 HYDRATE IV INFUSION ADD-ON: CPT

## 2024-06-30 PROCEDURE — 96374 THER/PROPH/DIAG INJ IV PUSH: CPT

## 2024-06-30 PROCEDURE — 99281 EMR DPT VST MAYX REQ PHY/QHP: CPT | Mod: 25,27

## 2024-06-30 PROCEDURE — 63600175 PHARM REV CODE 636 W HCPCS: Performed by: EMERGENCY MEDICINE

## 2024-06-30 PROCEDURE — 80048 BASIC METABOLIC PNL TOTAL CA: CPT | Performed by: EMERGENCY MEDICINE

## 2024-06-30 RX ORDER — KETOROLAC TROMETHAMINE 30 MG/ML
15 INJECTION, SOLUTION INTRAMUSCULAR; INTRAVENOUS
Status: COMPLETED | OUTPATIENT
Start: 2024-06-30 | End: 2024-06-30

## 2024-06-30 RX ORDER — POTASSIUM CHLORIDE 20 MEQ/1
20 TABLET, EXTENDED RELEASE ORAL 2 TIMES DAILY
Qty: 14 TABLET | Refills: 0 | Status: SHIPPED | OUTPATIENT
Start: 2024-06-30 | End: 2024-07-07

## 2024-06-30 RX ORDER — POTASSIUM CHLORIDE 20 MEQ/1
40 TABLET, EXTENDED RELEASE ORAL
Status: COMPLETED | OUTPATIENT
Start: 2024-06-30 | End: 2024-06-30

## 2024-06-30 RX ADMIN — KETOROLAC TROMETHAMINE 15 MG: 30 INJECTION, SOLUTION INTRAMUSCULAR at 02:06

## 2024-06-30 RX ADMIN — POTASSIUM CHLORIDE 40 MEQ: 1500 TABLET, EXTENDED RELEASE ORAL at 04:06

## 2024-06-30 RX ADMIN — SODIUM CHLORIDE, POTASSIUM CHLORIDE, SODIUM LACTATE AND CALCIUM CHLORIDE 1000 ML: 600; 310; 30; 20 INJECTION, SOLUTION INTRAVENOUS at 04:06

## 2024-06-30 RX ADMIN — SODIUM CHLORIDE 1000 ML: 9 INJECTION, SOLUTION INTRAVENOUS at 01:06

## 2024-06-30 SDOH — SOCIAL DETERMINANTS OF HEALTH (SDOH): HOMELESSNESS UNSPECIFIED: Z59.00

## 2024-06-30 NOTE — ED NOTES
"Pt yelled at 2 nurses since arrival to ED. GEORGI Nguyen attempted to sort pt. He states, "get out of my face."     Security notified and attempted to give pt a patient a behavioral agreement form. pt states, "I'm leaving."    Pt wheeled himself out of ED via wheelchair.     GCS 15  "
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
## 2024-06-30 NOTE — FIRST PROVIDER EVALUATION
"Medical screening examination initiated.  I have conducted a focused provider triage encounter, findings are as follows:    Brief history of present illness:  64y/o M presents to the ED per EMS  for concerns of having dehydration. Patient refuses to answer any questions during our SORT process. States" Get away from me."    Vitals:    06/30/24 1106   BP: 125/64   Pulse: 61   Resp: 14   Temp: 99.3 °F (37.4 °C)   TempSrc: Oral   SpO2: 99%   Weight: 81.6 kg (180 lb)   Height: 6' (1.829 m)       Pertinent physical exam:  Awake    Brief workup plan:  Lab    Preliminary workup initiated; this workup will be continued and followed by the physician or advanced practice provider that is assigned to the patient when roomed.  "

## 2024-07-05 ENCOUNTER — HOSPITAL ENCOUNTER (EMERGENCY)
Facility: HOSPITAL | Age: 64
Discharge: HOME | End: 2024-07-06
Attending: EMERGENCY MEDICINE
Payer: MEDICARE

## 2024-07-05 DIAGNOSIS — E86.0 DEHYDRATION: ICD-10-CM

## 2024-07-05 DIAGNOSIS — Z59.00 HOMELESSNESS: ICD-10-CM

## 2024-07-05 LAB
ALBUMIN SERPL-MCNC: 3.2 G/DL (ref 3.4–4.8)
ALBUMIN/GLOB SERPL: 1.1 RATIO (ref 1.1–2)
ALP SERPL-CCNC: 75 UNIT/L (ref 40–150)
ALT SERPL-CCNC: 22 UNIT/L (ref 0–55)
ANION GAP SERPL CALC-SCNC: 8 MEQ/L
AST SERPL-CCNC: 28 UNIT/L (ref 5–34)
BASOPHILS # BLD AUTO: 0.04 X10(3)/MCL
BASOPHILS NFR BLD AUTO: 0.7 %
BILIRUB SERPL-MCNC: 0.6 MG/DL
BUN SERPL-MCNC: 12 MG/DL (ref 8.4–25.7)
CALCIUM SERPL-MCNC: 9.2 MG/DL (ref 8.8–10)
CHLORIDE SERPL-SCNC: 103 MMOL/L (ref 98–107)
CO2 SERPL-SCNC: 26 MMOL/L (ref 23–31)
CREAT SERPL-MCNC: 0.81 MG/DL (ref 0.73–1.18)
CREAT/UREA NIT SERPL: 15
EOSINOPHIL # BLD AUTO: 0.29 X10(3)/MCL (ref 0–0.9)
EOSINOPHIL NFR BLD AUTO: 4.9 %
ERYTHROCYTE [DISTWIDTH] IN BLOOD BY AUTOMATED COUNT: 15.2 % (ref 11.5–17)
GFR SERPLBLD CREATININE-BSD FMLA CKD-EPI: >60 ML/MIN/1.73/M2
GLOBULIN SER-MCNC: 2.9 GM/DL (ref 2.4–3.5)
GLUCOSE SERPL-MCNC: 177 MG/DL (ref 82–115)
HCT VFR BLD AUTO: 36.8 % (ref 42–52)
HGB BLD-MCNC: 12.9 G/DL (ref 14–18)
IMM GRANULOCYTES # BLD AUTO: 0.02 X10(3)/MCL (ref 0–0.04)
IMM GRANULOCYTES NFR BLD AUTO: 0.3 %
LYMPHOCYTES # BLD AUTO: 1.87 X10(3)/MCL (ref 0.6–4.6)
LYMPHOCYTES NFR BLD AUTO: 31.9 %
MCH RBC QN AUTO: 34.4 PG (ref 27–31)
MCHC RBC AUTO-ENTMCNC: 35.1 G/DL (ref 33–36)
MCV RBC AUTO: 98.1 FL (ref 80–94)
MONOCYTES # BLD AUTO: 0.58 X10(3)/MCL (ref 0.1–1.3)
MONOCYTES NFR BLD AUTO: 9.9 %
NEUTROPHILS # BLD AUTO: 3.06 X10(3)/MCL (ref 2.1–9.2)
NEUTROPHILS NFR BLD AUTO: 52.3 %
NRBC BLD AUTO-RTO: 0 %
PLATELET # BLD AUTO: 122 X10(3)/MCL (ref 130–400)
PLATELETS.RETICULATED NFR BLD AUTO: 3.5 % (ref 0.9–11.2)
PMV BLD AUTO: 11 FL (ref 7.4–10.4)
POTASSIUM SERPL-SCNC: 3.3 MMOL/L (ref 3.5–5.1)
PROT SERPL-MCNC: 6.1 GM/DL (ref 5.8–7.6)
RBC # BLD AUTO: 3.75 X10(6)/MCL (ref 4.7–6.1)
SODIUM SERPL-SCNC: 137 MMOL/L (ref 136–145)
WBC # BLD AUTO: 5.86 X10(3)/MCL (ref 4.5–11.5)

## 2024-07-05 PROCEDURE — 99284 EMERGENCY DEPT VISIT MOD MDM: CPT | Mod: 25

## 2024-07-05 PROCEDURE — 96360 HYDRATION IV INFUSION INIT: CPT

## 2024-07-05 PROCEDURE — 80053 COMPREHEN METABOLIC PANEL: CPT | Performed by: STUDENT IN AN ORGANIZED HEALTH CARE EDUCATION/TRAINING PROGRAM

## 2024-07-05 PROCEDURE — 25000003 PHARM REV CODE 250: Performed by: PHYSICIAN ASSISTANT

## 2024-07-05 PROCEDURE — 85025 COMPLETE CBC W/AUTO DIFF WBC: CPT | Performed by: STUDENT IN AN ORGANIZED HEALTH CARE EDUCATION/TRAINING PROGRAM

## 2024-07-05 PROCEDURE — 81001 URINALYSIS AUTO W/SCOPE: CPT | Performed by: STUDENT IN AN ORGANIZED HEALTH CARE EDUCATION/TRAINING PROGRAM

## 2024-07-05 RX ORDER — SODIUM CHLORIDE 9 MG/ML
1000 INJECTION, SOLUTION INTRAVENOUS
Status: COMPLETED | OUTPATIENT
Start: 2024-07-05 | End: 2024-07-06

## 2024-07-05 RX ADMIN — SODIUM CHLORIDE 1000 ML: 9 INJECTION, SOLUTION INTRAVENOUS at 11:07

## 2024-07-05 SDOH — SOCIAL DETERMINANTS OF HEALTH (SDOH): HOMELESSNESS UNSPECIFIED: Z59.00

## 2024-07-06 ENCOUNTER — HOSPITAL ENCOUNTER (EMERGENCY)
Facility: HOSPITAL | Age: 64
Discharge: PSYCHIATRIC OR SUBSTANCE USE FACILITY | End: 2024-07-06
Attending: EMERGENCY MEDICINE
Payer: MEDICARE

## 2024-07-06 VITALS
HEART RATE: 69 BPM | RESPIRATION RATE: 17 BRPM | DIASTOLIC BLOOD PRESSURE: 82 MMHG | SYSTOLIC BLOOD PRESSURE: 170 MMHG | HEIGHT: 72 IN | WEIGHT: 170 LBS | TEMPERATURE: 98 F | OXYGEN SATURATION: 99 % | BODY MASS INDEX: 23.03 KG/M2

## 2024-07-06 VITALS
DIASTOLIC BLOOD PRESSURE: 86 MMHG | HEART RATE: 60 BPM | OXYGEN SATURATION: 97 % | HEIGHT: 72 IN | WEIGHT: 170 LBS | RESPIRATION RATE: 18 BRPM | BODY MASS INDEX: 23.03 KG/M2 | TEMPERATURE: 98 F | SYSTOLIC BLOOD PRESSURE: 167 MMHG

## 2024-07-06 DIAGNOSIS — Z59.00 HOMELESSNESS: ICD-10-CM

## 2024-07-06 DIAGNOSIS — Z00.8 MEDICAL CLEARANCE FOR PSYCHIATRIC ADMISSION: ICD-10-CM

## 2024-07-06 DIAGNOSIS — R45.851 DEPRESSION WITH SUICIDAL IDEATION: Primary | ICD-10-CM

## 2024-07-06 DIAGNOSIS — F32.A DEPRESSION WITH SUICIDAL IDEATION: Primary | ICD-10-CM

## 2024-07-06 LAB
ALBUMIN SERPL-MCNC: 2.9 G/DL (ref 3.4–4.8)
ALBUMIN/GLOB SERPL: 0.9 RATIO (ref 1.1–2)
ALP SERPL-CCNC: 76 UNIT/L (ref 40–150)
ALT SERPL-CCNC: 21 UNIT/L (ref 0–55)
AMPHET UR QL SCN: NEGATIVE
ANION GAP SERPL CALC-SCNC: 7 MEQ/L
APAP SERPL-MCNC: <3 UG/ML (ref 10–30)
AST SERPL-CCNC: 26 UNIT/L (ref 5–34)
BACTERIA #/AREA URNS AUTO: ABNORMAL /HPF
BACTERIA #/AREA URNS AUTO: ABNORMAL /HPF
BARBITURATE SCN PRESENT UR: NEGATIVE
BASOPHILS # BLD AUTO: 0.06 X10(3)/MCL
BASOPHILS NFR BLD AUTO: 1.1 %
BENZODIAZ UR QL SCN: NEGATIVE
BILIRUB SERPL-MCNC: 0.7 MG/DL
BILIRUB UR QL STRIP.AUTO: NEGATIVE
BILIRUB UR QL STRIP.AUTO: NEGATIVE
BUN SERPL-MCNC: 11.3 MG/DL (ref 8.4–25.7)
CALCIUM SERPL-MCNC: 9 MG/DL (ref 8.8–10)
CANNABINOIDS UR QL SCN: NEGATIVE
CAOX CRY UR QL COMP ASSIST: ABNORMAL
CHLORIDE SERPL-SCNC: 105 MMOL/L (ref 98–107)
CLARITY UR: CLEAR
CLARITY UR: CLEAR
CO2 SERPL-SCNC: 28 MMOL/L (ref 23–31)
COCAINE UR QL SCN: NEGATIVE
COLOR UR AUTO: ABNORMAL
COLOR UR AUTO: YELLOW
CREAT SERPL-MCNC: 0.81 MG/DL (ref 0.73–1.18)
CREAT/UREA NIT SERPL: 14
EOSINOPHIL # BLD AUTO: 0.25 X10(3)/MCL (ref 0–0.9)
EOSINOPHIL NFR BLD AUTO: 4.6 %
ERYTHROCYTE [DISTWIDTH] IN BLOOD BY AUTOMATED COUNT: 15 % (ref 11.5–17)
ETHANOL SERPL-MCNC: <10 MG/DL
FENTANYL UR QL SCN: NEGATIVE
GFR SERPLBLD CREATININE-BSD FMLA CKD-EPI: >60 ML/MIN/1.73/M2
GLOBULIN SER-MCNC: 3.1 GM/DL (ref 2.4–3.5)
GLUCOSE SERPL-MCNC: 174 MG/DL (ref 82–115)
GLUCOSE UR QL STRIP: ABNORMAL
GLUCOSE UR QL STRIP: ABNORMAL
HCT VFR BLD AUTO: 38.8 % (ref 42–52)
HGB BLD-MCNC: 13.4 G/DL (ref 14–18)
HGB UR QL STRIP: ABNORMAL
HGB UR QL STRIP: ABNORMAL
HYALINE CASTS #/AREA URNS LPF: ABNORMAL /LPF
IMM GRANULOCYTES # BLD AUTO: 0.02 X10(3)/MCL (ref 0–0.04)
IMM GRANULOCYTES NFR BLD AUTO: 0.4 %
KETONES UR QL STRIP: NEGATIVE
KETONES UR QL STRIP: NEGATIVE
LEUKOCYTE ESTERASE UR QL STRIP: NEGATIVE
LEUKOCYTE ESTERASE UR QL STRIP: NEGATIVE
LYMPHOCYTES # BLD AUTO: 1.48 X10(3)/MCL (ref 0.6–4.6)
LYMPHOCYTES NFR BLD AUTO: 27.2 %
MCH RBC QN AUTO: 33.9 PG (ref 27–31)
MCHC RBC AUTO-ENTMCNC: 34.5 G/DL (ref 33–36)
MCV RBC AUTO: 98.2 FL (ref 80–94)
MDMA UR QL SCN: NEGATIVE
MONOCYTES # BLD AUTO: 0.55 X10(3)/MCL (ref 0.1–1.3)
MONOCYTES NFR BLD AUTO: 10.1 %
MUCOUS THREADS URNS QL MICRO: ABNORMAL /LPF
MUCOUS THREADS URNS QL MICRO: ABNORMAL /LPF
NEUTROPHILS # BLD AUTO: 3.08 X10(3)/MCL (ref 2.1–9.2)
NEUTROPHILS NFR BLD AUTO: 56.6 %
NITRITE UR QL STRIP: NEGATIVE
NITRITE UR QL STRIP: NEGATIVE
NRBC BLD AUTO-RTO: 0 %
OPIATES UR QL SCN: NEGATIVE
PCP UR QL: NEGATIVE
PH UR STRIP: 6 [PH]
PH UR STRIP: 6 [PH]
PH UR: 6 [PH] (ref 3–11)
PLATELET # BLD AUTO: 120 X10(3)/MCL (ref 130–400)
PLATELETS.RETICULATED NFR BLD AUTO: 4.5 % (ref 0.9–11.2)
PMV BLD AUTO: 11 FL (ref 7.4–10.4)
POTASSIUM SERPL-SCNC: 3.9 MMOL/L (ref 3.5–5.1)
PROT SERPL-MCNC: 6 GM/DL (ref 5.8–7.6)
PROT UR QL STRIP: ABNORMAL
PROT UR QL STRIP: ABNORMAL
RBC # BLD AUTO: 3.95 X10(6)/MCL (ref 4.7–6.1)
RBC #/AREA URNS AUTO: ABNORMAL /HPF
RBC #/AREA URNS AUTO: ABNORMAL /HPF
SARS-COV-2 RDRP RESP QL NAA+PROBE: NEGATIVE
SODIUM SERPL-SCNC: 140 MMOL/L (ref 136–145)
SP GR UR STRIP.AUTO: 1.02 (ref 1–1.03)
SP GR UR STRIP.AUTO: 1.02 (ref 1–1.03)
SPECIFIC GRAVITY, URINE AUTO (.000) (OHS): 1.02 (ref 1–1.03)
SQUAMOUS #/AREA URNS LPF: ABNORMAL /HPF
SQUAMOUS #/AREA URNS LPF: ABNORMAL /HPF
TSH SERPL-ACNC: 1.29 UIU/ML (ref 0.35–4.94)
UROBILINOGEN UR STRIP-ACNC: NORMAL
UROBILINOGEN UR STRIP-ACNC: NORMAL
WBC # BLD AUTO: 5.44 X10(3)/MCL (ref 4.5–11.5)
WBC #/AREA URNS AUTO: ABNORMAL /HPF
WBC #/AREA URNS AUTO: ABNORMAL /HPF

## 2024-07-06 PROCEDURE — 80307 DRUG TEST PRSMV CHEM ANLYZR: CPT | Performed by: EMERGENCY MEDICINE

## 2024-07-06 PROCEDURE — 81001 URINALYSIS AUTO W/SCOPE: CPT | Performed by: EMERGENCY MEDICINE

## 2024-07-06 PROCEDURE — 81015 MICROSCOPIC EXAM OF URINE: CPT | Performed by: EMERGENCY MEDICINE

## 2024-07-06 PROCEDURE — 25000003 PHARM REV CODE 250: Performed by: EMERGENCY MEDICINE

## 2024-07-06 PROCEDURE — 84443 ASSAY THYROID STIM HORMONE: CPT | Performed by: EMERGENCY MEDICINE

## 2024-07-06 PROCEDURE — 99285 EMERGENCY DEPT VISIT HI MDM: CPT | Mod: 25

## 2024-07-06 PROCEDURE — 80143 DRUG ASSAY ACETAMINOPHEN: CPT | Performed by: EMERGENCY MEDICINE

## 2024-07-06 PROCEDURE — 96361 HYDRATE IV INFUSION ADD-ON: CPT

## 2024-07-06 PROCEDURE — 85025 COMPLETE CBC W/AUTO DIFF WBC: CPT | Performed by: EMERGENCY MEDICINE

## 2024-07-06 PROCEDURE — 80053 COMPREHEN METABOLIC PANEL: CPT | Performed by: EMERGENCY MEDICINE

## 2024-07-06 PROCEDURE — 82077 ASSAY SPEC XCP UR&BREATH IA: CPT | Performed by: EMERGENCY MEDICINE

## 2024-07-06 PROCEDURE — U0002 COVID-19 LAB TEST NON-CDC: HCPCS | Performed by: EMERGENCY MEDICINE

## 2024-07-06 RX ORDER — ALBUTEROL SULFATE 90 UG/1
2 AEROSOL, METERED RESPIRATORY (INHALATION) EVERY 6 HOURS PRN
Status: DISCONTINUED | OUTPATIENT
Start: 2024-07-06 | End: 2024-07-06 | Stop reason: HOSPADM

## 2024-07-06 RX ORDER — DIVALPROEX SODIUM 250 MG/1
500 TABLET, FILM COATED, EXTENDED RELEASE ORAL
Status: COMPLETED | OUTPATIENT
Start: 2024-07-06 | End: 2024-07-06

## 2024-07-06 RX ORDER — LISINOPRIL 10 MG/1
10 TABLET ORAL
Status: COMPLETED | OUTPATIENT
Start: 2024-07-06 | End: 2024-07-06

## 2024-07-06 RX ORDER — DIVALPROEX SODIUM 250 MG/1
500 TABLET, FILM COATED, EXTENDED RELEASE ORAL DAILY
Status: DISCONTINUED | OUTPATIENT
Start: 2024-07-06 | End: 2024-07-06

## 2024-07-06 RX ADMIN — LISINOPRIL 10 MG: 10 TABLET ORAL at 05:07

## 2024-07-06 RX ADMIN — DIVALPROEX SODIUM 500 MG: 250 TABLET, EXTENDED RELEASE ORAL at 05:07

## 2024-07-06 SDOH — SOCIAL DETERMINANTS OF HEALTH (SDOH): HOMELESSNESS UNSPECIFIED: Z59.00
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
## 2024-07-20 ENCOUNTER — HOSPITAL ENCOUNTER (EMERGENCY)
Facility: HOSPITAL | Age: 64
Discharge: PSYCHIATRIC OR SUBSTANCE USE FACILITY | End: 2024-07-21
Attending: FAMILY MEDICINE
Payer: MEDICARE
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
## 2024-07-20 DIAGNOSIS — R45.851 SUICIDAL IDEATION: Primary | ICD-10-CM

## 2024-07-20 LAB
ALBUMIN SERPL-MCNC: 3.8 G/DL (ref 3.4–5)
ALBUMIN/GLOB SERPL: 1.1 RATIO
ALP SERPL-CCNC: 82 UNIT/L (ref 50–144)
ALT SERPL-CCNC: 23 UNIT/L (ref 1–45)
AMPHET UR QL SCN: NORMAL
ANION GAP SERPL CALC-SCNC: 6 MEQ/L (ref 2–13)
APAP SERPL-MCNC: <10 UG/ML (ref 10–30)
AST SERPL-CCNC: 36 UNIT/L (ref 17–59)
BACTERIA #/AREA URNS AUTO: ABNORMAL /HPF
BARBITURATE SCN PRESENT UR: NEGATIVE
BASOPHILS # BLD AUTO: 0.06 X10(3)/MCL (ref 0.01–0.08)
BASOPHILS NFR BLD AUTO: 0.8 % (ref 0.1–1.2)
BENZODIAZ UR QL SCN: NEGATIVE
BILIRUB SERPL-MCNC: 0.6 MG/DL (ref 0–1)
BILIRUB UR QL STRIP.AUTO: ABNORMAL
BUN SERPL-MCNC: 27 MG/DL (ref 7–20)
CALCIUM SERPL-MCNC: 9.8 MG/DL (ref 8.4–10.2)
CANNABINOIDS UR QL SCN: NEGATIVE
CHLORIDE SERPL-SCNC: 102 MMOL/L (ref 98–110)
CLARITY UR: ABNORMAL
CO2 SERPL-SCNC: 30 MMOL/L (ref 21–32)
COCAINE UR QL SCN: NEGATIVE
COLOR UR AUTO: YELLOW
CREAT SERPL-MCNC: 1.43 MG/DL (ref 0.66–1.25)
CREAT/UREA NIT SERPL: 19 (ref 12–20)
EOSINOPHIL # BLD AUTO: 0.12 X10(3)/MCL (ref 0.04–0.54)
EOSINOPHIL NFR BLD AUTO: 1.7 % (ref 0.7–7)
ERYTHROCYTE [DISTWIDTH] IN BLOOD BY AUTOMATED COUNT: 14 %
ETHANOL BLD-MCNC: <0.01 G/DL
ETHANOL SERPL-MCNC: <10 MG/DL
GFR SERPLBLD CREATININE-BSD FMLA CKD-EPI: 54 ML/MIN/1.73/M2
GLOBULIN SER-MCNC: 3.6 GM/DL (ref 2–3.9)
GLUCOSE SERPL-MCNC: 247 MG/DL (ref 70–115)
GLUCOSE UR QL STRIP: NEGATIVE
HCT VFR BLD AUTO: 41.6 % (ref 36–52)
HGB BLD-MCNC: 14.5 G/DL (ref 13–18)
HGB UR QL STRIP: ABNORMAL
IMM GRANULOCYTES # BLD AUTO: 0.28 X10(3)/MCL (ref 0–0.03)
IMM GRANULOCYTES NFR BLD AUTO: 3.9 % (ref 0–0.5)
KETONES UR QL STRIP: NEGATIVE
LEUKOCYTE ESTERASE UR QL STRIP: ABNORMAL
LYMPHOCYTES # BLD AUTO: 1.87 X10(3)/MCL (ref 1.32–3.57)
LYMPHOCYTES NFR BLD AUTO: 26.2 % (ref 20–55)
MCH RBC QN AUTO: 33.7 PG (ref 27–34)
MCHC RBC AUTO-ENTMCNC: 34.9 G/DL (ref 31–37)
MCV RBC AUTO: 96.7 FL (ref 79–99)
METHADONE UR QL SCN: NEGATIVE
MONOCYTES # BLD AUTO: 1.06 X10(3)/MCL (ref 0.3–0.82)
MONOCYTES NFR BLD AUTO: 14.9 % (ref 4.7–12.5)
NEUTROPHILS # BLD AUTO: 3.74 X10(3)/MCL (ref 1.78–5.38)
NEUTROPHILS NFR BLD AUTO: 52.5 % (ref 37–73)
NITRITE UR QL STRIP: NEGATIVE
NRBC BLD AUTO-RTO: 0 %
OPIATES UR QL SCN: NEGATIVE
PCP UR QL: NEGATIVE
PH UR STRIP: 6 [PH]
PH UR: 6 [PH] (ref 5–8)
PLATELET # BLD AUTO: 217 X10(3)/MCL (ref 140–371)
PMV BLD AUTO: 10.2 FL (ref 9.4–12.4)
POTASSIUM SERPL-SCNC: 4 MMOL/L (ref 3.5–5.1)
PROT SERPL-MCNC: 7.4 GM/DL (ref 6.3–8.2)
PROT UR QL STRIP: 100
RBC # BLD AUTO: 4.3 X10(6)/MCL (ref 4–6)
RBC #/AREA URNS AUTO: ABNORMAL /HPF
SODIUM SERPL-SCNC: 138 MMOL/L (ref 136–145)
SP GR UR STRIP.AUTO: >=1.03 (ref 1–1.03)
SQUAMOUS #/AREA URNS AUTO: ABNORMAL /HPF
TSH SERPL-ACNC: 2.53 UIU/ML (ref 0.36–3.74)
UROBILINOGEN UR STRIP-ACNC: 0.2
WBC # BLD AUTO: 7.13 X10(3)/MCL (ref 4–11.5)
WBC #/AREA URNS AUTO: ABNORMAL /HPF

## 2024-07-20 PROCEDURE — 80307 DRUG TEST PRSMV CHEM ANLYZR: CPT | Performed by: FAMILY MEDICINE

## 2024-07-20 PROCEDURE — 81003 URINALYSIS AUTO W/O SCOPE: CPT | Mod: 59 | Performed by: FAMILY MEDICINE

## 2024-07-20 PROCEDURE — 85025 COMPLETE CBC W/AUTO DIFF WBC: CPT | Performed by: FAMILY MEDICINE

## 2024-07-20 PROCEDURE — 87086 URINE CULTURE/COLONY COUNT: CPT | Performed by: FAMILY MEDICINE

## 2024-07-20 PROCEDURE — 80053 COMPREHEN METABOLIC PANEL: CPT | Performed by: FAMILY MEDICINE

## 2024-07-20 PROCEDURE — 87186 SC STD MICRODIL/AGAR DIL: CPT | Performed by: FAMILY MEDICINE

## 2024-07-20 PROCEDURE — 99285 EMERGENCY DEPT VISIT HI MDM: CPT | Mod: 25

## 2024-07-20 PROCEDURE — 80143 DRUG ASSAY ACETAMINOPHEN: CPT | Performed by: FAMILY MEDICINE

## 2024-07-20 PROCEDURE — 25000003 PHARM REV CODE 250: Performed by: FAMILY MEDICINE

## 2024-07-20 PROCEDURE — 84443 ASSAY THYROID STIM HORMONE: CPT | Performed by: FAMILY MEDICINE

## 2024-07-20 PROCEDURE — 82077 ASSAY SPEC XCP UR&BREATH IA: CPT | Performed by: FAMILY MEDICINE

## 2024-07-20 PROCEDURE — 81015 MICROSCOPIC EXAM OF URINE: CPT | Performed by: FAMILY MEDICINE

## 2024-07-20 RX ORDER — CIPROFLOXACIN 500 MG/1
500 TABLET ORAL
Status: COMPLETED | OUTPATIENT
Start: 2024-07-20 | End: 2024-07-20

## 2024-07-20 RX ADMIN — CIPROFLOXACIN 500 MG: 500 TABLET, FILM COATED ORAL at 11:07

## 2024-07-21 VITALS
OXYGEN SATURATION: 99 % | HEART RATE: 88 BPM | RESPIRATION RATE: 16 BRPM | SYSTOLIC BLOOD PRESSURE: 113 MMHG | TEMPERATURE: 98 F | DIASTOLIC BLOOD PRESSURE: 72 MMHG

## 2024-07-21 RX ORDER — CIPROFLOXACIN 500 MG/1
500 TABLET ORAL 2 TIMES DAILY
Qty: 10 TABLET | Refills: 0 | Status: SHIPPED | OUTPATIENT
Start: 2024-07-21 | End: 2024-07-26
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
## 2024-07-23 LAB — BACTERIA UR CULT: ABNORMAL

## (undated) DEVICE — MANIFOLD 4 PORT

## (undated) DEVICE — JELLY SURGILUBE LUBE TUBE 2OZ

## (undated) DEVICE — GLOVE PROTEXIS PI SYN SURG 7.5

## (undated) DEVICE — BANDAGE ACE ELASTIC 6"

## (undated) DEVICE — ADAPTER DUAL NSL LUER M-M 7FT

## (undated) DEVICE — TROCAR ENDOPATH XCEL 11MM 10CM

## (undated) DEVICE — GLOVE PROTEXIS PI SYN SURG 6.0

## (undated) DEVICE — ELECTRODE PATIENT RETURN DISP

## (undated) DEVICE — SUT SA85H SILK 2-0

## (undated) DEVICE — SPONGE GAUZE 16PLY 4X4

## (undated) DEVICE — SUT 2-0 ETHILON 18 FS

## (undated) DEVICE — Device

## (undated) DEVICE — SCISSOR CURVED ENDOPATH 5MM

## (undated) DEVICE — GLOVE PROTEXIS BLUE LATEX 7

## (undated) DEVICE — TUBING O2 FEMALE CONN 13FT

## (undated) DEVICE — SUT 2/0 30IN SILK BLK BRAI

## (undated) DEVICE — SUT 2/0 36IN COATED VICRYL

## (undated) DEVICE — KIT BASIC ORTHO UNIVERSITY

## (undated) DEVICE — TUBING MEDI-VAC 20FT .25IN

## (undated) DEVICE — GLOVE PROTEXIS LTX MICRO  7

## (undated) DEVICE — DRAPE EXTREMITY STD 89X128IN

## (undated) DEVICE — GLOVE PROTEXIS LTX MICRO  7.5

## (undated) DEVICE — GLOVE SURG BIOGEL LATEX SZ 7.5

## (undated) DEVICE — APPLIER CLIP ENDO MED/LG 10MM

## (undated) DEVICE — SEE MEDLINE ITEM 157216

## (undated) DEVICE — SOL 9P NACL IRR PIC IL

## (undated) DEVICE — TRAY SKIN SCRUB WET PREMIUM

## (undated) DEVICE — GAUZE SPONGE 4X4 12PLY

## (undated) DEVICE — PAD ABDOMINAL STERILE 8X10IN

## (undated) DEVICE — SUPPORT ULNA NERVE PROTECTOR

## (undated) DEVICE — BRUSH SCRUB SURGICALW/BETADINE

## (undated) DEVICE — NDL SAFETY 22G X 1.5 ECLIPSE

## (undated) DEVICE — SUT VICRYL PLUS 4-0 FS-2 27IN

## (undated) DEVICE — GLOVE PROTEXIS HYDROGEL SZ7.5

## (undated) DEVICE — ADAPTER DBL MALE LL CLR POLY

## (undated) DEVICE — SPONGE LAP STRL 18X18IN

## (undated) DEVICE — POUCH INSTRUMENT ADH 10X18IN

## (undated) DEVICE — STAPLER SKIN COUNT 35 W STPL

## (undated) DEVICE — BENZOIN TINCTURE CAPSULET

## (undated) DEVICE — BLADE SAW GUIDE GIG 20IN 10/BX

## (undated) DEVICE — APPLIER CLIP ENDO LIGAMAX 5MM

## (undated) DEVICE — COVER LIGHT HANDLE RIGID GRN

## (undated) DEVICE — GOWN POLY REINF BRTH SLV XL

## (undated) DEVICE — CLOSURE SKIN STERI STRIP 1/2X4

## (undated) DEVICE — SOL NORMAL USPCA 0.9%

## (undated) DEVICE — PAD PREP CUFFED NS 24X48IN

## (undated) DEVICE — SOLIDIFIER BOTTLE 1500CC

## (undated) DEVICE — HANDLE DEVON RIGID OR LIGHT

## (undated) DEVICE — SUT VICRYL+ 27 UR-6 VIOL

## (undated) DEVICE — SEE MEDLINE ITEM 146322

## (undated) DEVICE — TROCAR ENDOPATH XCEL 11X100MM

## (undated) DEVICE — GLOVE PROTEXIS BLUE LATEX 7.5

## (undated) DEVICE — TROCAR ENDOPATH XCEL 5X100MM

## (undated) DEVICE — BAG SPEC RETRV ENDO 4X6IN DISP

## (undated) DEVICE — HEMOSTAT SURGICEL FIBRLR 2X4IN

## (undated) DEVICE — CANNULA ENDOPATH XCEL 5X100MM

## (undated) DEVICE — TIP SUCTION YANKAUER

## (undated) DEVICE — DRESSING GAUZE XEROFORM 5X9

## (undated) DEVICE — SUT 2-0 VICRYL / SH (J417)

## (undated) DEVICE — SYS HYDRO-SURG IRR SMOKE EVAC

## (undated) DEVICE — SOL IRRI STRL WATER 1000ML